# Patient Record
Sex: FEMALE | Race: OTHER | Employment: FULL TIME | URBAN - METROPOLITAN AREA
[De-identification: names, ages, dates, MRNs, and addresses within clinical notes are randomized per-mention and may not be internally consistent; named-entity substitution may affect disease eponyms.]

---

## 2017-01-07 ENCOUNTER — APPOINTMENT (OUTPATIENT)
Dept: ENDOCRINOLOGY | Facility: HOSPITAL | Age: 37
End: 2017-01-07
Attending: INTERNAL MEDICINE
Payer: COMMERCIAL

## 2017-02-04 ENCOUNTER — APPOINTMENT (OUTPATIENT)
Dept: ENDOCRINOLOGY | Facility: HOSPITAL | Age: 37
End: 2017-02-04
Attending: INTERNAL MEDICINE
Payer: COMMERCIAL

## 2017-02-11 ENCOUNTER — APPOINTMENT (OUTPATIENT)
Dept: ENDOCRINOLOGY | Facility: HOSPITAL | Age: 37
End: 2017-02-11
Attending: INTERNAL MEDICINE
Payer: COMMERCIAL

## 2017-02-18 ENCOUNTER — APPOINTMENT (OUTPATIENT)
Dept: ENDOCRINOLOGY | Facility: HOSPITAL | Age: 37
End: 2017-02-18
Attending: INTERNAL MEDICINE
Payer: COMMERCIAL

## 2017-02-25 ENCOUNTER — APPOINTMENT (OUTPATIENT)
Dept: ENDOCRINOLOGY | Facility: HOSPITAL | Age: 37
End: 2017-02-25
Attending: INTERNAL MEDICINE
Payer: COMMERCIAL

## 2017-04-11 ENCOUNTER — HOSPITAL ENCOUNTER (OUTPATIENT)
Age: 37
Discharge: HOME | End: 2017-04-11
Attending: EMERGENCY MEDICINE
Payer: COMMERCIAL

## 2017-04-11 VITALS
OXYGEN SATURATION: 100 % | HEIGHT: 63 IN | DIASTOLIC BLOOD PRESSURE: 100 MMHG | BODY MASS INDEX: 26.22 KG/M2 | RESPIRATION RATE: 16 BRPM | HEART RATE: 93 BPM | TEMPERATURE: 99 F | SYSTOLIC BLOOD PRESSURE: 170 MMHG | WEIGHT: 148 LBS

## 2017-04-11 DIAGNOSIS — E11.65 TYPE 2 DIABETES MELLITUS WITH HYPERGLYCEMIA, WITHOUT LONG-TERM CURRENT USE OF INSULIN (HCC): ICD-10-CM

## 2017-04-11 DIAGNOSIS — H00.012 HORDEOLUM EXTERNUM OF RIGHT LOWER EYELID: Primary | ICD-10-CM

## 2017-04-11 PROCEDURE — 82962 GLUCOSE BLOOD TEST: CPT

## 2017-04-11 PROCEDURE — 99214 OFFICE O/P EST MOD 30 MIN: CPT

## 2017-04-11 PROCEDURE — 99213 OFFICE O/P EST LOW 20 MIN: CPT

## 2017-04-12 NOTE — ED INITIAL ASSESSMENT (HPI)
Redness and small bump on right lower lid since Friday. Pt does wear contact lenses but haven't been wearing them since Saturday. Yesterday, pt developed some burning on right cheek. Some red bumps noted on it.

## 2017-04-12 NOTE — ED PROVIDER NOTES
[facility] [address]    History   Patient presents with:   Eye Visual Problem (opthalmic)    Stated Complaint: \"stye\"    HPI    The patient is a 49-year-old female with past history of hyperglycemia (currently on elements reviewed from today and agreed except as otherwise stated in HPI.     Physical Exam       ED Triage Vitals   BP 04/11/17 1947 188/97 mmHg   Pulse 04/11/17 1947 103   Resp 04/11/17 1947 16   Temp 04/11/17 1947 98.5 °F (36.9 °C)   Temp src 04/11/17 1 [facility])    Disposition:  Discharge    Follow-up:  Mojgan Newsome, [address] [id number]    In 3 days        Medications Prescribed:  Current Discharge Medication List    START taking these medications    Tobramycin Sulfate 0.3 % Oph

## 2017-04-12 NOTE — ED NOTES
Instructed pt to daily check BP around the same time for a few days, keep a log and discuss it with PMD

## 2017-06-14 ENCOUNTER — LAB ENCOUNTER (OUTPATIENT)
Dept: LAB | Age: 37
End: 2017-06-14
Attending: INTERNAL MEDICINE
Payer: COMMERCIAL

## 2017-06-14 ENCOUNTER — OFFICE VISIT (OUTPATIENT)
Dept: INTERNAL MEDICINE CLINIC | Facility: CLINIC | Age: 37
End: 2017-06-14

## 2017-06-14 VITALS
RESPIRATION RATE: 16 BRPM | SYSTOLIC BLOOD PRESSURE: 146 MMHG | DIASTOLIC BLOOD PRESSURE: 89 MMHG | HEART RATE: 99 BPM | WEIGHT: 138 LBS | TEMPERATURE: 98 F | BODY MASS INDEX: 24 KG/M2

## 2017-06-14 DIAGNOSIS — E11.29 TYPE 2 DIABETES MELLITUS WITH MICROALBUMINURIA, WITHOUT LONG-TERM CURRENT USE OF INSULIN (HCC): ICD-10-CM

## 2017-06-14 DIAGNOSIS — R80.9 TYPE 2 DIABETES MELLITUS WITH MICROALBUMINURIA, WITHOUT LONG-TERM CURRENT USE OF INSULIN (HCC): ICD-10-CM

## 2017-06-14 DIAGNOSIS — E11.43 GASTROPARESIS DUE TO DM (HCC): ICD-10-CM

## 2017-06-14 DIAGNOSIS — R11.14 BILIOUS VOMITING WITH NAUSEA: Primary | ICD-10-CM

## 2017-06-14 DIAGNOSIS — I10 ESSENTIAL HYPERTENSION: ICD-10-CM

## 2017-06-14 DIAGNOSIS — K31.84 GASTROPARESIS DUE TO DM (HCC): ICD-10-CM

## 2017-06-14 PROCEDURE — 83036 HEMOGLOBIN GLYCOSYLATED A1C: CPT

## 2017-06-14 PROCEDURE — 80061 LIPID PANEL: CPT

## 2017-06-14 PROCEDURE — 84443 ASSAY THYROID STIM HORMONE: CPT

## 2017-06-14 PROCEDURE — 36415 COLL VENOUS BLD VENIPUNCTURE: CPT

## 2017-06-14 PROCEDURE — 85025 COMPLETE CBC W/AUTO DIFF WBC: CPT

## 2017-06-14 PROCEDURE — 99212 OFFICE O/P EST SF 10 MIN: CPT | Performed by: INTERNAL MEDICINE

## 2017-06-14 PROCEDURE — 80053 COMPREHEN METABOLIC PANEL: CPT

## 2017-06-14 PROCEDURE — 99214 OFFICE O/P EST MOD 30 MIN: CPT | Performed by: INTERNAL MEDICINE

## 2017-06-14 RX ORDER — METOCLOPRAMIDE 5 MG/1
5 TABLET ORAL
Qty: 30 TABLET | Refills: 0 | Status: SHIPPED | OUTPATIENT
Start: 2017-06-14 | End: 2017-06-27

## 2017-06-14 NOTE — PROGRESS NOTES
Bonnie Borrego is a 43year old female.   Patient presents with:  Stomach Pain: presenting for vomiting, pressure,bloating x5 days       HPI:   Patient is a 42-year-old woman with past medical history of diabetes  C/c states that she has noticed some blo from throwing up   SKIN: denies any unusual skin lesions or rashes  RESPIRATORY: denies shortness of breath, cough, wheezing  CARDIOVASCULAR: denies chest pain on exertion, palpitations, swelling in feet  GI: denies abdominal pain and denies heartburn, ximena try reglan                                                                                                                                                              Essential hypertension   She states that she would like to see her primary care jaswinderia

## 2017-06-15 ENCOUNTER — APPOINTMENT (OUTPATIENT)
Dept: CT IMAGING | Facility: HOSPITAL | Age: 37
End: 2017-06-15
Attending: EMERGENCY MEDICINE
Payer: COMMERCIAL

## 2017-06-15 ENCOUNTER — TELEPHONE (OUTPATIENT)
Dept: GASTROENTEROLOGY | Facility: CLINIC | Age: 37
End: 2017-06-15

## 2017-06-15 ENCOUNTER — HOSPITAL ENCOUNTER (EMERGENCY)
Facility: HOSPITAL | Age: 37
Discharge: HOME | End: 2017-06-15
Attending: EMERGENCY MEDICINE
Payer: COMMERCIAL

## 2017-06-15 VITALS
TEMPERATURE: 98 F | SYSTOLIC BLOOD PRESSURE: 148 MMHG | BODY MASS INDEX: 24.45 KG/M2 | DIASTOLIC BLOOD PRESSURE: 89 MMHG | HEART RATE: 90 BPM | WEIGHT: 138 LBS | RESPIRATION RATE: 20 BRPM | HEIGHT: 63 IN | OXYGEN SATURATION: 100 %

## 2017-06-15 DIAGNOSIS — R11.2 NAUSEA AND VOMITING, INTRACTABILITY OF VOMITING NOT SPECIFIED, UNSPECIFIED VOMITING TYPE: ICD-10-CM

## 2017-06-15 DIAGNOSIS — K52.9 ENTERITIS: Primary | ICD-10-CM

## 2017-06-15 PROCEDURE — 74177 CT ABD & PELVIS W/CONTRAST: CPT | Performed by: EMERGENCY MEDICINE

## 2017-06-15 PROCEDURE — 81001 URINALYSIS AUTO W/SCOPE: CPT | Performed by: EMERGENCY MEDICINE

## 2017-06-15 PROCEDURE — 85025 COMPLETE CBC W/AUTO DIFF WBC: CPT | Performed by: EMERGENCY MEDICINE

## 2017-06-15 PROCEDURE — 96361 HYDRATE IV INFUSION ADD-ON: CPT

## 2017-06-15 PROCEDURE — 99284 EMERGENCY DEPT VISIT MOD MDM: CPT

## 2017-06-15 PROCEDURE — 96374 THER/PROPH/DIAG INJ IV PUSH: CPT

## 2017-06-15 PROCEDURE — 81025 URINE PREGNANCY TEST: CPT

## 2017-06-15 PROCEDURE — 80053 COMPREHEN METABOLIC PANEL: CPT | Performed by: EMERGENCY MEDICINE

## 2017-06-15 PROCEDURE — 87086 URINE CULTURE/COLONY COUNT: CPT | Performed by: EMERGENCY MEDICINE

## 2017-06-15 PROCEDURE — 83690 ASSAY OF LIPASE: CPT | Performed by: EMERGENCY MEDICINE

## 2017-06-15 PROCEDURE — 96375 TX/PRO/DX INJ NEW DRUG ADDON: CPT

## 2017-06-15 RX ORDER — ONDANSETRON 4 MG/1
4 TABLET, ORALLY DISINTEGRATING ORAL EVERY 4 HOURS PRN
Qty: 15 TABLET | Refills: 0 | Status: SHIPPED | OUTPATIENT
Start: 2017-06-15 | End: 2017-06-27

## 2017-06-15 RX ORDER — MORPHINE SULFATE 2 MG/ML
2 INJECTION, SOLUTION INTRAMUSCULAR; INTRAVENOUS ONCE
Status: COMPLETED | OUTPATIENT
Start: 2017-06-15 | End: 2017-06-15

## 2017-06-15 RX ORDER — ONDANSETRON 2 MG/ML
4 INJECTION INTRAMUSCULAR; INTRAVENOUS ONCE
Status: COMPLETED | OUTPATIENT
Start: 2017-06-15 | End: 2017-06-15

## 2017-06-15 RX ORDER — RANITIDINE 150 MG/1
150 TABLET ORAL EVERY 12 HOURS
Qty: 60 TABLET | Refills: 0 | Status: SHIPPED | OUTPATIENT
Start: 2017-06-15 | End: 2017-06-30

## 2017-06-15 NOTE — TELEPHONE ENCOUNTER
Pt was in the ER today - has swollen bowels , vomiting , gastro enteritis - they did CAT scan - told her to f/u with EMS

## 2017-06-15 NOTE — ED INITIAL ASSESSMENT (HPI)
Pt reports nausea and vomiting for the past month.  Pt seen pmd yest and dx with gastroenteritis and given metoclopramide and pt reports no relief of vomititng

## 2017-06-15 NOTE — ED PROVIDER NOTES
Patient Seen in: [facility] Emergency Department    History   Patient presents with:  Nausea/Vomiting/Diarrhea (gastrointestinal)    Stated Complaint: vomtiting for a month    HPI    Patient complains of mid upper abdominal pain that began few day as otherwise stated in HPI.     Physical Exam       ED Triage Vitals   BP 06/15/17 0918 154/92 mmHg   Pulse 06/15/17 0918 109   Resp 06/15/17 0918 20   Temp 06/15/17 0918 98.1 °F (36.7 °C)   Temp src 06/15/17 0918 Temporal   SpO2 06/15/17 1050 100 %   O2 De Narrative: The following orders were created for panel order CBC WITH DIFFERENTIAL WITH PLATELET.   Procedure                               Abnormality         Status                     ---------                               -----------         ------ 0

## 2017-06-16 NOTE — TELEPHONE ENCOUNTER
Dr. Sophy Burt:    No sooner appt available with you would this patient be OK to f/u in office with PB? Pt was in ED 6/15/17 for abdominal pain and intermittent N/V.      The CT a/p revealed:  CONCLUSION:   1. Nonspecific slight increase in small bowel fl

## 2017-06-17 ENCOUNTER — HOSPITAL ENCOUNTER (EMERGENCY)
Facility: HOSPITAL | Age: 37
Discharge: HOME | End: 2017-06-17
Attending: EMERGENCY MEDICINE
Payer: COMMERCIAL

## 2017-06-17 ENCOUNTER — APPOINTMENT (OUTPATIENT)
Dept: ULTRASOUND IMAGING | Facility: HOSPITAL | Age: 37
End: 2017-06-17
Attending: EMERGENCY MEDICINE
Payer: COMMERCIAL

## 2017-06-17 VITALS
WEIGHT: 138 LBS | HEIGHT: 63 IN | DIASTOLIC BLOOD PRESSURE: 90 MMHG | HEART RATE: 96 BPM | BODY MASS INDEX: 24.45 KG/M2 | OXYGEN SATURATION: 100 % | TEMPERATURE: 99 F | RESPIRATION RATE: 18 BRPM | SYSTOLIC BLOOD PRESSURE: 150 MMHG

## 2017-06-17 DIAGNOSIS — R11.15 NON-INTRACTABLE CYCLICAL VOMITING WITH NAUSEA: Primary | ICD-10-CM

## 2017-06-17 PROCEDURE — 83690 ASSAY OF LIPASE: CPT | Performed by: EMERGENCY MEDICINE

## 2017-06-17 PROCEDURE — 96375 TX/PRO/DX INJ NEW DRUG ADDON: CPT

## 2017-06-17 PROCEDURE — 96374 THER/PROPH/DIAG INJ IV PUSH: CPT

## 2017-06-17 PROCEDURE — 80076 HEPATIC FUNCTION PANEL: CPT | Performed by: EMERGENCY MEDICINE

## 2017-06-17 PROCEDURE — 85025 COMPLETE CBC W/AUTO DIFF WBC: CPT | Performed by: EMERGENCY MEDICINE

## 2017-06-17 PROCEDURE — 80048 BASIC METABOLIC PNL TOTAL CA: CPT | Performed by: EMERGENCY MEDICINE

## 2017-06-17 PROCEDURE — 99284 EMERGENCY DEPT VISIT MOD MDM: CPT

## 2017-06-17 PROCEDURE — 96361 HYDRATE IV INFUSION ADD-ON: CPT

## 2017-06-17 RX ORDER — HYDROCODONE BITARTRATE AND ACETAMINOPHEN 5; 325 MG/1; MG/1
1 TABLET ORAL EVERY 8 HOURS PRN
Qty: 10 TABLET | Refills: 0 | Status: SHIPPED | OUTPATIENT
Start: 2017-06-17 | End: 2017-06-24

## 2017-06-17 RX ORDER — HYDROMORPHONE HYDROCHLORIDE 1 MG/ML
0.5 INJECTION, SOLUTION INTRAMUSCULAR; INTRAVENOUS; SUBCUTANEOUS ONCE
Status: COMPLETED | OUTPATIENT
Start: 2017-06-17 | End: 2017-06-17

## 2017-06-17 RX ORDER — SODIUM CHLORIDE 9 MG/ML
INJECTION, SOLUTION INTRAVENOUS ONCE
Status: COMPLETED | OUTPATIENT
Start: 2017-06-17 | End: 2017-06-17

## 2017-06-17 RX ORDER — ONDANSETRON 4 MG/1
4 TABLET, ORALLY DISINTEGRATING ORAL EVERY 4 HOURS PRN
Qty: 20 TABLET | Refills: 0 | Status: SHIPPED | OUTPATIENT
Start: 2017-06-17 | End: 2017-06-24

## 2017-06-17 RX ORDER — ONDANSETRON 2 MG/ML
4 INJECTION INTRAMUSCULAR; INTRAVENOUS ONCE
Status: COMPLETED | OUTPATIENT
Start: 2017-06-17 | End: 2017-06-17

## 2017-06-17 RX ORDER — HYDROMORPHONE HYDROCHLORIDE 1 MG/ML
0.5 INJECTION, SOLUTION INTRAMUSCULAR; INTRAVENOUS; SUBCUTANEOUS ONCE
Status: DISCONTINUED | OUTPATIENT
Start: 2017-06-17 | End: 2017-06-17

## 2017-06-17 NOTE — ED INITIAL ASSESSMENT (HPI)
Patient c/o nausea, vomiting x 1 week. Seen here for the same and started on ranitidine and zofran without improvement. Also c/o general abdominal pain.

## 2017-06-17 NOTE — ED PROVIDER NOTES
Patient Seen in: [facility] AND [facility] Emergency Department    History   Patient presents with:  Nausea/Vomiting/Diarrhea (gastrointestinal)    Stated Complaint: Nausea    HPI    The patient is a 61-year-old female with past history of non-insulin-dependent MetFORMIN HCl  MG Oral Tablet 24 Hr,  Take 1 tablet (500 mg total) by mouth 2 (two) times daily with meals.        Family History   Problem Relation Age of Onset   • Hypertension Mother    • Diabetes Father    • Musculo-skelatal Disorder Father      A the touch      ED Course     Labs Reviewed   BASIC METABOLIC PANEL (8) - Abnormal; Notable for the following:     Glucose 195 (*)     Sodium 135 (*)     BUN 6 (*)     Creatinine 0.43 (*)     All other components within normal limits   HEPATIC FUNCTION PANE provider.

## 2017-06-18 NOTE — TELEPHONE ENCOUNTER
Chart reviewed. Patient has a history of diabetic gastroparesis. It is okay to see  Migel Srivastava PA-C in the office. She may need documentation of gastroparesis with gastric emptying scan if not already done.

## 2017-06-19 NOTE — TELEPHONE ENCOUNTER
Will be happy to evaluate the patient. If previous GES not performed, will start there. Yaneth Thomas, please forward when the patient has been scheduled. Hope she is feeling better!

## 2017-06-19 NOTE — TELEPHONE ENCOUNTER
Pt contacted and she accepted a f/u appt with PB on 6/27/17 @ 9:30am, [facility]

## 2017-06-26 NOTE — H&P
[address] [facility] [address] Gastroenterology                                                                                                  Clinic History and Physical     Pa note, the patient states \"I am in denial about my diabetes\". She reports being diagnosed with diabetes approximately 12 years ago. She is not currently taking any medications for this. She denies chest pain, shortness of breath or syncopal episodes. by mouth daily. Disp:  Rfl:    FREESTYLE SYSTEM Does not apply Kit 1 each by Does not apply route as needed for Other.  Disp: 1 kit Rfl: 0   Glucose Blood (FREESTYLE TEST) In Vitro Strip Diabetes Disp: 100 strip Rfl: 0   FREESTYLE LANCETS Does not apply Mis epigastric pain, nausea and vomiting x 2 weeks.  Patient was seen in the ED 6/15 revealing total bili at 1.6 with direct 0.3, lipase, LFTs and alk phos WNL, negative pregnancy tests and abnormal urinalysis c/w uncontrolled DMII (Glucose >500, +ketones +prot OV    Orders This Visit:    Orders Placed This Encounter      Hepatic Function Panel (7)    Meds This Visit:    No prescriptions requested or ordered in this encounter    Imaging & Referrals:  None       Carie Muñoz PA-C  6/27/2017

## 2017-06-27 ENCOUNTER — OFFICE VISIT (OUTPATIENT)
Dept: GASTROENTEROLOGY | Facility: CLINIC | Age: 37
End: 2017-06-27

## 2017-06-27 VITALS
SYSTOLIC BLOOD PRESSURE: 130 MMHG | WEIGHT: 137.38 LBS | HEIGHT: 62.5 IN | BODY MASS INDEX: 24.65 KG/M2 | DIASTOLIC BLOOD PRESSURE: 85 MMHG | HEART RATE: 109 BPM

## 2017-06-27 DIAGNOSIS — R11.2 NAUSEA AND VOMITING, INTRACTABILITY OF VOMITING NOT SPECIFIED, UNSPECIFIED VOMITING TYPE: Primary | ICD-10-CM

## 2017-06-27 DIAGNOSIS — R10.13 EPIGASTRIC PAIN: ICD-10-CM

## 2017-06-27 DIAGNOSIS — R17 ELEVATED BILIRUBIN: ICD-10-CM

## 2017-06-27 PROCEDURE — 99213 OFFICE O/P EST LOW 20 MIN: CPT | Performed by: PHYSICIAN ASSISTANT

## 2017-06-27 PROCEDURE — 99244 OFF/OP CNSLTJ NEW/EST MOD 40: CPT | Performed by: PHYSICIAN ASSISTANT

## 2017-06-27 NOTE — PATIENT INSTRUCTIONS
1. Please follow up with Dr. Atiya Kenny re: your diabetes. This is very important in your overall health and management of your symptoms. 2. Please also bring up (non-urgent) the gynecology concerns you discussed with me in office.     3. Avoid heartburn t

## 2017-07-17 ENCOUNTER — TELEPHONE (OUTPATIENT)
Dept: INTERNAL MEDICINE CLINIC | Facility: CLINIC | Age: 37
End: 2017-07-17

## 2017-07-19 ENCOUNTER — OFFICE VISIT (OUTPATIENT)
Dept: INTERNAL MEDICINE CLINIC | Facility: CLINIC | Age: 37
End: 2017-07-19

## 2017-07-19 VITALS
BODY MASS INDEX: 24.76 KG/M2 | HEART RATE: 57 BPM | SYSTOLIC BLOOD PRESSURE: 128 MMHG | HEIGHT: 62.5 IN | WEIGHT: 138 LBS | TEMPERATURE: 99 F | OXYGEN SATURATION: 99 % | DIASTOLIC BLOOD PRESSURE: 78 MMHG

## 2017-07-19 DIAGNOSIS — R10.13 EPIGASTRIC PAIN: ICD-10-CM

## 2017-07-19 DIAGNOSIS — R11.2 NAUSEA AND VOMITING, INTRACTABILITY OF VOMITING NOT SPECIFIED, UNSPECIFIED VOMITING TYPE: Primary | ICD-10-CM

## 2017-07-19 DIAGNOSIS — E11.8 UNCONTROLLED TYPE 2 DIABETES MELLITUS WITH COMPLICATION, WITHOUT LONG-TERM CURRENT USE OF INSULIN (HCC): ICD-10-CM

## 2017-07-19 DIAGNOSIS — E78.5 HYPERLIPIDEMIA, UNSPECIFIED HYPERLIPIDEMIA TYPE: ICD-10-CM

## 2017-07-19 DIAGNOSIS — E11.65 UNCONTROLLED TYPE 2 DIABETES MELLITUS WITH COMPLICATION, WITHOUT LONG-TERM CURRENT USE OF INSULIN (HCC): ICD-10-CM

## 2017-07-19 LAB
ALBUMIN SERPL BCP-MCNC: 3.9 G/DL (ref 3.5–4.8)
ALBUMIN/GLOB SERPL: 1.3 {RATIO} (ref 1–2)
ALP SERPL-CCNC: 43 U/L (ref 32–100)
ALT SERPL-CCNC: 17 U/L (ref 14–54)
ANION GAP SERPL CALC-SCNC: 11 MMOL/L (ref 0–18)
AST SERPL-CCNC: 18 U/L (ref 15–41)
BASOPHILS # BLD: 0 K/UL (ref 0–0.2)
BASOPHILS NFR BLD: 0 %
BILIRUB SERPL-MCNC: 1 MG/DL (ref 0.3–1.2)
BUN SERPL-MCNC: 5 MG/DL (ref 8–20)
BUN/CREAT SERPL: 11.6 (ref 10–20)
CALCIUM SERPL-MCNC: 9 MG/DL (ref 8.5–10.5)
CHLORIDE SERPL-SCNC: 102 MMOL/L (ref 95–110)
CO2 SERPL-SCNC: 26 MMOL/L (ref 22–32)
CREAT SERPL-MCNC: 0.43 MG/DL (ref 0.5–1.5)
EOSINOPHIL # BLD: 0 K/UL (ref 0–0.7)
EOSINOPHIL NFR BLD: 1 %
ERYTHROCYTE [DISTWIDTH] IN BLOOD BY AUTOMATED COUNT: 12.7 % (ref 11–15)
GLOBULIN PLAS-MCNC: 3.1 G/DL (ref 2.5–3.7)
GLUCOSE SERPL-MCNC: 151 MG/DL (ref 70–99)
HCT VFR BLD AUTO: 39.3 % (ref 35–48)
HGB BLD-MCNC: 13.6 G/DL (ref 12–16)
LIPASE SERPL-CCNC: 25 U/L (ref 22–51)
LYMPHOCYTES # BLD: 1.8 K/UL (ref 1–4)
LYMPHOCYTES NFR BLD: 26 %
MCH RBC QN AUTO: 32.6 PG (ref 27–32)
MCHC RBC AUTO-ENTMCNC: 34.7 G/DL (ref 32–37)
MCV RBC AUTO: 94 FL (ref 80–100)
MONOCYTES # BLD: 0.4 K/UL (ref 0–1)
MONOCYTES NFR BLD: 6 %
NEUTROPHILS # BLD AUTO: 4.6 K/UL (ref 1.8–7.7)
NEUTROPHILS NFR BLD: 68 %
OSMOLALITY UR CALC.SUM OF ELEC: 288 MOSM/KG (ref 275–295)
PLATELET # BLD AUTO: 265 K/UL (ref 140–400)
PMV BLD AUTO: 8.9 FL (ref 7.4–10.3)
POTASSIUM SERPL-SCNC: 3.8 MMOL/L (ref 3.3–5.1)
PROT SERPL-MCNC: 7 G/DL (ref 5.9–8.4)
RBC # BLD AUTO: 4.18 M/UL (ref 3.7–5.4)
SODIUM SERPL-SCNC: 139 MMOL/L (ref 136–144)
WBC # BLD AUTO: 6.8 K/UL (ref 4–11)

## 2017-07-19 PROCEDURE — 80053 COMPREHEN METABOLIC PANEL: CPT | Performed by: INTERNAL MEDICINE

## 2017-07-19 PROCEDURE — 99214 OFFICE O/P EST MOD 30 MIN: CPT | Performed by: INTERNAL MEDICINE

## 2017-07-19 PROCEDURE — 83690 ASSAY OF LIPASE: CPT | Performed by: INTERNAL MEDICINE

## 2017-07-19 PROCEDURE — 36415 COLL VENOUS BLD VENIPUNCTURE: CPT | Performed by: INTERNAL MEDICINE

## 2017-07-19 PROCEDURE — 85025 COMPLETE CBC W/AUTO DIFF WBC: CPT | Performed by: INTERNAL MEDICINE

## 2017-07-19 RX ORDER — RANITIDINE 150 MG/1
150 TABLET ORAL 2 TIMES DAILY
COMMUNITY
End: 2017-10-13

## 2017-07-19 RX ORDER — OMEPRAZOLE 40 MG/1
40 CAPSULE, DELAYED RELEASE ORAL DAILY
Qty: 30 CAPSULE | Refills: 1 | Status: SHIPPED | OUTPATIENT
Start: 2017-07-19 | End: 2017-08-18

## 2017-07-19 RX ORDER — METOCLOPRAMIDE 10 MG/1
10 TABLET ORAL 3 TIMES DAILY PRN
Qty: 30 TABLET | Refills: 1 | Status: SHIPPED | OUTPATIENT
Start: 2017-07-19 | End: 2017-10-20 | Stop reason: ALTCHOICE

## 2017-07-19 NOTE — PROGRESS NOTES
Carlton Dunn is a 43year old female.     Chief complaint: ER follow up, epigastric pain, nausea and vomiting     HPI:     43 ear old female non compliant   Here for ER follow up   Reports having nausea dn vomiting   2-3 months ago   Upset stomach on a ALS     Patient Active Problem List:     Cough     Contraception management     Leg pain     Breast pain     Vitamin D deficiency     Type 2 diabetes mellitus with microalbuminuria, without long-term current use of insulin ([facility] [address]      REVIEW OF SYSTEMS:   A to follow up with pcp   Will check stool H pylori after getting the sample to start PPI  Ddx? Gastritis/ ulcer gastroparesis   Advised to control her dm   Start reglan ?  Gastroparesis ?      (R10.13) Epigastric pain  Plan: will check cbc, cmp and lipase

## 2017-07-20 ENCOUNTER — NURSE ONLY (OUTPATIENT)
Dept: INTERNAL MEDICINE CLINIC | Facility: CLINIC | Age: 37
End: 2017-07-20

## 2017-07-20 DIAGNOSIS — R10.13 EPIGASTRIC PAIN: ICD-10-CM

## 2017-07-20 PROCEDURE — 87338 HPYLORI STOOL AG IA: CPT | Performed by: INTERNAL MEDICINE

## 2017-07-22 LAB — HELICOBACTER PYLORI AG, FECAL: NEGATIVE

## 2017-08-01 ENCOUNTER — TELEPHONE (OUTPATIENT)
Dept: INTERNAL MEDICINE CLINIC | Facility: CLINIC | Age: 37
End: 2017-08-01

## 2017-08-01 DIAGNOSIS — R11.2 NAUSEA AND VOMITING, INTRACTABILITY OF VOMITING NOT SPECIFIED, UNSPECIFIED VOMITING TYPE: Primary | ICD-10-CM

## 2017-08-02 ENCOUNTER — TELEPHONE (OUTPATIENT)
Dept: GASTROENTEROLOGY | Facility: CLINIC | Age: 37
End: 2017-08-02

## 2017-08-02 NOTE — TELEPHONE ENCOUNTER
Pt states that Dr. Alton Murcia gave her permission to go ahead with EGD. Pt would like to schedule as soon as possible. Please call.

## 2017-08-02 NOTE — TELEPHONE ENCOUNTER
Spoke to pt    She states she is experiencing abdominal pain despite the Omeprazole. She has discontinued the Ranitidine because it wasn't working at all    H pylori is negative.     She has a f/u appt with you on 08/17/17  She states she spoke with her PCP

## 2017-08-03 NOTE — TELEPHONE ENCOUNTER
Carie,     The pt has next OV with you on 8/17/17 @ 9am. Per last OV note on 6/27/17 she was to f/u and discuss possible EGD at next OV. She is calling wanted to schedule the procedure.      Please advise if OK to proceed with procedure or if you would like

## 2017-08-04 NOTE — TELEPHONE ENCOUNTER
Scheduled for:  EGD [id number]  Provider Name: Dr. Victor Hugo Lindsay  Date:  Tuesday, 8/8/17  Location:  [facility]  Sedation:  IV  Time: 7:30am (6:30am arrival time)   Prep: NPO at midnight  Meds/Allergies Reconciled?:  Yes, no blood thinners, pt is on janumet  Diagnosis with c

## 2017-08-04 NOTE — TELEPHONE ENCOUNTER
Dr. Carlton Goodman placed her on DMII medications - f/u in September. Had a f/u planned for this coming Monday but was told to cx by provider. Still throwing up - not triggered by food. Eating same things every day. Intermittent good days.  Feels as if the med

## 2017-08-08 ENCOUNTER — HOSPITAL ENCOUNTER (OUTPATIENT)
Facility: HOSPITAL | Age: 37
Setting detail: HOSPITAL OUTPATIENT SURGERY
Discharge: HOME | End: 2017-08-08
Attending: INTERNAL MEDICINE | Admitting: INTERNAL MEDICINE
Payer: COMMERCIAL

## 2017-08-08 ENCOUNTER — SURGERY (OUTPATIENT)
Age: 37
End: 2017-08-08

## 2017-08-08 ENCOUNTER — TELEPHONE (OUTPATIENT)
Dept: GASTROENTEROLOGY | Facility: CLINIC | Age: 37
End: 2017-08-08

## 2017-08-08 DIAGNOSIS — R10.13 EPIGASTRIC PAIN: ICD-10-CM

## 2017-08-08 DIAGNOSIS — R11.2 NAUSEA AND VOMITING: ICD-10-CM

## 2017-08-08 LAB — B-HCG UR QL: NEGATIVE

## 2017-08-08 PROCEDURE — 0DB68ZX EXCISION OF STOMACH, VIA NATURAL OR ARTIFICIAL OPENING ENDOSCOPIC, DIAGNOSTIC: ICD-10-PCS | Performed by: INTERNAL MEDICINE

## 2017-08-08 PROCEDURE — 43239 EGD BIOPSY SINGLE/MULTIPLE: CPT | Performed by: INTERNAL MEDICINE

## 2017-08-08 PROCEDURE — 99152 MOD SED SAME PHYS/QHP 5/>YRS: CPT | Performed by: INTERNAL MEDICINE

## 2017-08-08 RX ORDER — MIDAZOLAM HYDROCHLORIDE 1 MG/ML
INJECTION INTRAMUSCULAR; INTRAVENOUS
Status: DISCONTINUED | OUTPATIENT
Start: 2017-08-08 | End: 2017-08-08

## 2017-08-08 RX ORDER — SODIUM CHLORIDE, SODIUM LACTATE, POTASSIUM CHLORIDE, CALCIUM CHLORIDE 600; 310; 30; 20 MG/100ML; MG/100ML; MG/100ML; MG/100ML
INJECTION, SOLUTION INTRAVENOUS CONTINUOUS
Status: DISCONTINUED | OUTPATIENT
Start: 2017-08-08 | End: 2017-08-08

## 2017-08-08 RX ORDER — MIDAZOLAM HYDROCHLORIDE 1 MG/ML
1 INJECTION INTRAMUSCULAR; INTRAVENOUS EVERY 5 MIN PRN
Status: DISCONTINUED | OUTPATIENT
Start: 2017-08-08 | End: 2017-08-08

## 2017-08-08 RX ORDER — SODIUM CHLORIDE 0.9 % (FLUSH) 0.9 %
10 SYRINGE (ML) INJECTION AS NEEDED
Status: DISCONTINUED | OUTPATIENT
Start: 2017-08-08 | End: 2017-08-08

## 2017-08-08 NOTE — H&P
History & Physical Examination    Patient Name: Regaan Cornejo  MRN: [id number]  [facility]: [id number]  YOB: 1980    Diagnosis: Recurring vomiting, belching symptom    Present Illness:     Reagan Cornejo is a 43year old female patient of  taking any medications for this.     She denies chest pain, shortness of breath or syncopal episodes.   She does report feeling weak when she presented to the emergency department.     Personal Hx Cancer(s):  - None     Surgical Hx:  - Cholecystectomy 2005/ Saline Flush 0.9 % injection 10 mL 10 mL Intravenous PRN   lactated ringers infusion  Intravenous Continuous   Midazolam HCl (VERSED) 2 MG/2ML injection 1 mg 1 mg Intravenous Q5 Min PRN   fentaNYL citrate (SUBLIMAZE) 0.05 MG/ML injection 25 mcg 25 mcg Intr

## 2017-08-08 NOTE — TELEPHONE ENCOUNTER
Mailed letter to patient notifying him of overdue labs (Hepatic Function Panel) Ordered 6-27-17 and due mid-July 2017. Overdue letter mailed to patient.

## 2017-08-08 NOTE — PRE-SEDATION ASSESSMENT
Physician Pre-Sedation Assessment    Pre-Sedation Assessment:    Sedation History: Airway Assessed    Cardiac: normal S1, S2  Respiratory: breath sounds clear bilaterally   Abdomen: soft, BS (+), non-tender    ASA Classification: 2.  Patient with mild syste

## 2017-08-08 NOTE — OPERATIVE REPORT
EGD PROCEDURE REPORT    DATE OF PROCEDURE:  8/8/2017    PCP: Hai Hall MD     PREOPERATIVE DIAGNOSIS: Recurring vomiting, belching symptom, recent uncontrolled diabetes     POSTOPERATIVE DIAGNOSIS:  See impression. SURGEON:  Juan Espinoza,

## 2017-08-09 VITALS
HEIGHT: 63 IN | HEART RATE: 92 BPM | BODY MASS INDEX: 23.92 KG/M2 | RESPIRATION RATE: 13 BRPM | WEIGHT: 135 LBS | SYSTOLIC BLOOD PRESSURE: 113 MMHG | OXYGEN SATURATION: 99 % | DIASTOLIC BLOOD PRESSURE: 82 MMHG

## 2017-08-09 LAB — CLO TEST: NEGATIVE

## 2017-08-15 RX ORDER — BLOOD-GLUCOSE METER
KIT MISCELLANEOUS
Qty: 100 STRIP | Refills: 0 | Status: SHIPPED | OUTPATIENT
Start: 2017-08-15 | End: 2021-05-10 | Stop reason: WASHOUT

## 2017-08-16 ENCOUNTER — TELEPHONE (OUTPATIENT)
Dept: GASTROENTEROLOGY | Facility: CLINIC | Age: 37
End: 2017-08-16

## 2017-08-17 NOTE — TELEPHONE ENCOUNTER
Pt contacted and reviewed Dr. Samantha Puckett message below in detail. She states that she has been feeling much better since d/c the omeprazole and feels that the medication \"wasn't do anything\" for her.  She states it has been over 1 week that she has not vomi

## 2017-08-17 NOTE — TELEPHONE ENCOUNTER
GI RNs–  Please call Ms. Claire Timmons and family to advise that gastric biopsy showed mild gastritis but no infection or serious problem to explain her symptoms. This probably is diabetic gastroparesis, the effects of diabetes on the stomach.     If nausea a

## 2017-10-13 ENCOUNTER — OFFICE VISIT (OUTPATIENT)
Dept: INTERNAL MEDICINE CLINIC | Facility: CLINIC | Age: 37
End: 2017-10-13

## 2017-10-13 VITALS
HEIGHT: 62.5 IN | TEMPERATURE: 99 F | SYSTOLIC BLOOD PRESSURE: 122 MMHG | HEART RATE: 97 BPM | WEIGHT: 139.38 LBS | BODY MASS INDEX: 25.01 KG/M2 | DIASTOLIC BLOOD PRESSURE: 80 MMHG | OXYGEN SATURATION: 99 %

## 2017-10-13 DIAGNOSIS — N89.8 VAGINAL DISCHARGE: ICD-10-CM

## 2017-10-13 DIAGNOSIS — E11.29 TYPE 2 DIABETES MELLITUS WITH MICROALBUMINURIA, WITHOUT LONG-TERM CURRENT USE OF INSULIN (HCC): Primary | ICD-10-CM

## 2017-10-13 DIAGNOSIS — E78.5 HYPERLIPIDEMIA, UNSPECIFIED HYPERLIPIDEMIA TYPE: ICD-10-CM

## 2017-10-13 DIAGNOSIS — R80.9 TYPE 2 DIABETES MELLITUS WITH MICROALBUMINURIA, WITHOUT LONG-TERM CURRENT USE OF INSULIN (HCC): Primary | ICD-10-CM

## 2017-10-13 DIAGNOSIS — E55.9 VITAMIN D DEFICIENCY: ICD-10-CM

## 2017-10-13 DIAGNOSIS — Z71.6 ENCOUNTER FOR SMOKING CESSATION COUNSELING: ICD-10-CM

## 2017-10-13 DIAGNOSIS — R11.2 NAUSEA AND VOMITING, INTRACTABILITY OF VOMITING NOT SPECIFIED, UNSPECIFIED VOMITING TYPE: ICD-10-CM

## 2017-10-13 DIAGNOSIS — Z23 NEED FOR INFLUENZA VACCINATION: ICD-10-CM

## 2017-10-13 PROCEDURE — 87808 TRICHOMONAS ASSAY W/OPTIC: CPT | Performed by: INTERNAL MEDICINE

## 2017-10-13 PROCEDURE — 80053 COMPREHEN METABOLIC PANEL: CPT | Performed by: INTERNAL MEDICINE

## 2017-10-13 PROCEDURE — 99214 OFFICE O/P EST MOD 30 MIN: CPT | Performed by: INTERNAL MEDICINE

## 2017-10-13 PROCEDURE — 80061 LIPID PANEL: CPT | Performed by: INTERNAL MEDICINE

## 2017-10-13 PROCEDURE — 87205 SMEAR GRAM STAIN: CPT | Performed by: INTERNAL MEDICINE

## 2017-10-13 PROCEDURE — 82043 UR ALBUMIN QUANTITATIVE: CPT | Performed by: INTERNAL MEDICINE

## 2017-10-13 PROCEDURE — 87106 FUNGI IDENTIFICATION YEAST: CPT | Performed by: INTERNAL MEDICINE

## 2017-10-13 PROCEDURE — 82570 ASSAY OF URINE CREATININE: CPT | Performed by: INTERNAL MEDICINE

## 2017-10-13 PROCEDURE — 82306 VITAMIN D 25 HYDROXY: CPT | Performed by: INTERNAL MEDICINE

## 2017-10-13 PROCEDURE — 83036 HEMOGLOBIN GLYCOSYLATED A1C: CPT | Performed by: INTERNAL MEDICINE

## 2017-10-13 PROCEDURE — 90471 IMMUNIZATION ADMIN: CPT | Performed by: INTERNAL MEDICINE

## 2017-10-13 PROCEDURE — 90686 IIV4 VACC NO PRSV 0.5 ML IM: CPT | Performed by: INTERNAL MEDICINE

## 2017-10-13 NOTE — PROGRESS NOTES
Radha Alexis is a 42year old female.     Chief complaint: follow up on DM , vagina discharge, vomiting     HPI:     42year old female with PMH as listed below here for follow up on dm   Has been taking her medications regularly   No sweating no tremo ALS     Patient Active Problem List:     Cough     Contraception management     Leg pain     Breast pain     Vitamin D deficiency     Type 2 diabetes mellitus with microalbuminuria, without long-term current use of insulin ([facility] [address]      REVIEW OF SYSTEMS:   A 5. Nausea and vomiting, intractability of vomiting not specified, unspecified vomiting type  Follow up with gi   Check Hba1c     6.  Hyperlipidemia, unspecified hyperlipidemia type  Lipid panel     7-encounter  for  smoking cessation :  Advised risks

## 2017-10-18 ENCOUNTER — TELEPHONE (OUTPATIENT)
Dept: INTERNAL MEDICINE CLINIC | Facility: CLINIC | Age: 37
End: 2017-10-18

## 2017-10-18 DIAGNOSIS — Z11.3 SCREENING EXAMINATION FOR STD (SEXUALLY TRANSMITTED DISEASE): Primary | ICD-10-CM

## 2017-10-18 RX ORDER — METRONIDAZOLE 500 MG/1
500 TABLET ORAL 2 TIMES DAILY
Qty: 14 TABLET | Refills: 0 | Status: SHIPPED | OUTPATIENT
Start: 2017-10-18 | End: 2017-10-25

## 2017-10-18 RX ORDER — LISINOPRIL 2.5 MG/1
2.5 TABLET ORAL DAILY
Qty: 90 TABLET | Refills: 1 | Status: ON HOLD | OUTPATIENT
Start: 2017-10-18 | End: 2017-11-21

## 2017-10-18 RX ORDER — ERGOCALCIFEROL 1.25 MG/1
50000 CAPSULE ORAL WEEKLY
Qty: 12 CAPSULE | Refills: 1 | Status: SHIPPED | OUTPATIENT
Start: 2017-10-18 | End: 2018-01-04

## 2017-10-20 ENCOUNTER — OFFICE VISIT (OUTPATIENT)
Dept: GASTROENTEROLOGY | Facility: CLINIC | Age: 37
End: 2017-10-20

## 2017-10-20 VITALS
BODY MASS INDEX: 24.76 KG/M2 | HEART RATE: 98 BPM | DIASTOLIC BLOOD PRESSURE: 80 MMHG | WEIGHT: 138 LBS | SYSTOLIC BLOOD PRESSURE: 123 MMHG | HEIGHT: 62.5 IN

## 2017-10-20 DIAGNOSIS — R11.2 NAUSEA AND VOMITING, INTRACTABILITY OF VOMITING NOT SPECIFIED, UNSPECIFIED VOMITING TYPE: Primary | ICD-10-CM

## 2017-10-20 DIAGNOSIS — K59.00 CONSTIPATION, UNSPECIFIED CONSTIPATION TYPE: ICD-10-CM

## 2017-10-20 PROCEDURE — 99214 OFFICE O/P EST MOD 30 MIN: CPT | Performed by: PHYSICIAN ASSISTANT

## 2017-10-20 PROCEDURE — 99212 OFFICE O/P EST SF 10 MIN: CPT | Performed by: PHYSICIAN ASSISTANT

## 2017-10-20 RX ORDER — ONDANSETRON 4 MG/1
4 TABLET, FILM COATED ORAL EVERY 8 HOURS PRN
Qty: 30 TABLET | Refills: 1 | Status: SHIPPED | OUTPATIENT
Start: 2017-10-20 | End: 2021-05-10 | Stop reason: WASHOUT

## 2017-10-20 NOTE — PROGRESS NOTES
[facility] Follow-up Visit    Sindy controlling her DM. She has not tried anything for her BMs. Occasionally feels as if straining, but generally not. Daily BMs without blood or melena. No rectal pain, fevers, chills, diarrhea or night sweats. No CP/SOB. Denies unintentional weight loss. Patient believes she may have undergone EGD at the time of her cholecystectomy      Social Hx:  - 0.5 ppd/20 years/Social ETOH  - Denies illicit drug use   - Occupation: [address] [place] with  and 25year old daughter  - Was t LANCETS Does not apply Misc dm Disp: 100 each Rfl: 0       Allergies:  No Known Allergies    ROS:   CONSTITUTIONAL:  negative for fevers, chills  EYES:  negative for change in vision  RESPIRATORY:  negative for  shortness of breath  CARDIOVASCULAR:  negati mass identified. Hepatic labs are overdue. Patient underwent upper endoscopy with Dr. Julien Ramirez August 2017 which was unremarkable. Started taking Reglan before meals 1-2 times a day and does not feel as if this has given her much change in her symptoms.  Pres

## 2017-10-20 NOTE — PATIENT INSTRUCTIONS
1. Gastric emptying scan - please call to schedule this. 2. Discontinue Reglan (Metoclopramide) and start taking Zofran. 3. Start fiber supplement daily - Benefiber + plenty of water. 4. Keep track of if you become dizzy and then nauseous.  Please

## 2017-10-21 ENCOUNTER — APPOINTMENT (OUTPATIENT)
Dept: LAB | Age: 37
End: 2017-10-21
Attending: INTERNAL MEDICINE
Payer: COMMERCIAL

## 2017-10-21 DIAGNOSIS — Z11.3 SCREENING EXAMINATION FOR STD (SEXUALLY TRANSMITTED DISEASE): ICD-10-CM

## 2017-10-21 DIAGNOSIS — R17 ELEVATED BILIRUBIN: ICD-10-CM

## 2017-10-21 PROCEDURE — 80076 HEPATIC FUNCTION PANEL: CPT

## 2017-10-21 PROCEDURE — 87389 HIV-1 AG W/HIV-1&-2 AB AG IA: CPT

## 2017-10-21 PROCEDURE — 86803 HEPATITIS C AB TEST: CPT

## 2017-10-21 PROCEDURE — 86780 TREPONEMA PALLIDUM: CPT

## 2017-10-21 PROCEDURE — 86704 HEP B CORE ANTIBODY TOTAL: CPT

## 2017-10-21 PROCEDURE — 87340 HEPATITIS B SURFACE AG IA: CPT

## 2017-10-21 PROCEDURE — 80500 HEPATITIS B PROFILE: CPT

## 2017-10-21 PROCEDURE — 86706 HEP B SURFACE ANTIBODY: CPT

## 2017-10-21 PROCEDURE — 36415 COLL VENOUS BLD VENIPUNCTURE: CPT

## 2017-10-31 ENCOUNTER — HOSPITAL ENCOUNTER (OUTPATIENT)
Dept: NUCLEAR MEDICINE | Facility: HOSPITAL | Age: 37
Discharge: HOME | End: 2017-10-31
Attending: PHYSICIAN ASSISTANT
Payer: COMMERCIAL

## 2017-10-31 DIAGNOSIS — R11.2 NAUSEA AND VOMITING, INTRACTABILITY OF VOMITING NOT SPECIFIED, UNSPECIFIED VOMITING TYPE: ICD-10-CM

## 2017-10-31 PROCEDURE — 78264 GASTRIC EMPTYING IMG STUDY: CPT | Performed by: PHYSICIAN ASSISTANT

## 2017-11-01 ENCOUNTER — PATIENT MESSAGE (OUTPATIENT)
Dept: GASTROENTEROLOGY | Facility: CLINIC | Age: 37
End: 2017-11-01

## 2017-11-02 NOTE — TELEPHONE ENCOUNTER
From: Radha Alexis  To:  [facility]te [facility] [place]  Sent: 11/1/2017 12:31 PM CDT  Subject: Test Results Question    Good Afternoon,   Thank you for keeping me up to date on my results, it's good news the test was normal. Last week I threw up 4 days out of 7 bu

## 2017-11-03 NOTE — TELEPHONE ENCOUNTER
Mary Duckworth,     At this point, I would monitor what you were doing for the last 1 week wherein you have not vomited since Thursday.  Fortunately, we have

## 2017-11-13 ENCOUNTER — OFFICE VISIT (OUTPATIENT)
Dept: FAMILY MEDICINE CLINIC | Facility: CLINIC | Age: 37
End: 2017-11-13

## 2017-11-13 VITALS
OXYGEN SATURATION: 99 % | SYSTOLIC BLOOD PRESSURE: 124 MMHG | DIASTOLIC BLOOD PRESSURE: 82 MMHG | BODY MASS INDEX: 26 KG/M2 | TEMPERATURE: 99 F | WEIGHT: 144 LBS | HEART RATE: 98 BPM

## 2017-11-13 DIAGNOSIS — N30.90 CYSTITIS: ICD-10-CM

## 2017-11-13 DIAGNOSIS — R30.0 DYSURIA: Primary | ICD-10-CM

## 2017-11-13 PROCEDURE — 87086 URINE CULTURE/COLONY COUNT: CPT | Performed by: PHYSICIAN ASSISTANT

## 2017-11-13 PROCEDURE — 81003 URINALYSIS AUTO W/O SCOPE: CPT | Performed by: PHYSICIAN ASSISTANT

## 2017-11-13 PROCEDURE — 99213 OFFICE O/P EST LOW 20 MIN: CPT | Performed by: PHYSICIAN ASSISTANT

## 2017-11-13 RX ORDER — NITROFURANTOIN 25; 75 MG/1; MG/1
100 CAPSULE ORAL 2 TIMES DAILY
Qty: 14 CAPSULE | Refills: 0 | Status: SHIPPED | OUTPATIENT
Start: 2017-11-13 | End: 2017-11-20

## 2017-11-13 RX ORDER — SULFAMETHOXAZOLE AND TRIMETHOPRIM 800; 160 MG/1; MG/1
1 TABLET ORAL 2 TIMES DAILY
Qty: 14 TABLET | Refills: 0 | Status: SHIPPED | OUTPATIENT
Start: 2017-11-13 | End: 2017-11-20

## 2017-11-14 NOTE — PATIENT INSTRUCTIONS
You likely have a urinary tract infection. Your urine was sent to the lab for a culture. We will get the results of the culture back in about 72 hours.   This information will tell us what type of bacteria is in your urine and whether that bacteria is res · Drink lots of fluids (at least 6-8 glasses a day, unless you must restrict fluids for other medical reasons). This will force the medicine into your urinary system and flush the bacteria out of your body.   · Avoid sexual intercourse until your symptoms a

## 2017-11-14 NOTE — PROGRESS NOTES
CHIEF COMPLAINT:   Patient presents with:  Urinary Frequency      HPI:   Roberto Coulter is a 42year old female who presents with symptoms of UTI. Complaining of urinary frequency, urgency, dysuria for 4 days.    Denies flank pain, fever, abdominal nikita HEENT: no congestion, rhinorrhea, sore throat or ear pain  CARDIOVASCULAR: denies chest pain or palpitations  LUNGS: denies shortness of breath, cough, or wheezing  GI: See HPI. No N/V/C/D. : See HPI.       EXAM:   /82   Pulse 98   Temp 98.5 °F (3 Keep the area around your vagina clean. Wipe from front to back after you go to the bathroom. Gently wash the area around your vagina when you bathe or shower. Wear cotton underwear. Use pantyhose with cotton crotches. Avoid tight clothing.  Wear A bladder infection (\"cystitis\" or \"UTI\") usually causes a constant urge to urinate and a burning when passing urine. Urine may be cloudy, smelly or dark. There may be pain in the lower abdomen.  A bladder infection occurs when bacteria from the vaginal · Weakness, dizziness or fainting  · Vaginal discharge  · Pain, redness or swelling in the labia (outer vaginal area)  © 0428-2107 The [address] [place] [address], [address] [place] [address]. All rights reserved.  This information is not intended a

## 2017-11-16 ENCOUNTER — PATIENT MESSAGE (OUTPATIENT)
Dept: GASTROENTEROLOGY | Facility: CLINIC | Age: 37
End: 2017-11-16

## 2017-11-17 ENCOUNTER — HOSPITAL ENCOUNTER (EMERGENCY)
Facility: HOSPITAL | Age: 37
Discharge: HOME | End: 2017-11-17
Attending: EMERGENCY MEDICINE
Payer: COMMERCIAL

## 2017-11-17 ENCOUNTER — APPOINTMENT (OUTPATIENT)
Dept: CT IMAGING | Facility: HOSPITAL | Age: 37
End: 2017-11-17
Attending: EMERGENCY MEDICINE
Payer: COMMERCIAL

## 2017-11-17 ENCOUNTER — TELEPHONE (OUTPATIENT)
Dept: GASTROENTEROLOGY | Facility: CLINIC | Age: 37
End: 2017-11-17

## 2017-11-17 VITALS
SYSTOLIC BLOOD PRESSURE: 136 MMHG | BODY MASS INDEX: 24.8 KG/M2 | HEIGHT: 63 IN | WEIGHT: 140 LBS | RESPIRATION RATE: 16 BRPM | OXYGEN SATURATION: 100 % | TEMPERATURE: 98 F | DIASTOLIC BLOOD PRESSURE: 77 MMHG | HEART RATE: 100 BPM

## 2017-11-17 DIAGNOSIS — R11.15 INTRACTABLE CYCLICAL VOMITING WITH NAUSEA: Primary | ICD-10-CM

## 2017-11-17 DIAGNOSIS — F19.10 DRUG ABUSE (HCC): ICD-10-CM

## 2017-11-17 PROCEDURE — 96361 HYDRATE IV INFUSION ADD-ON: CPT

## 2017-11-17 PROCEDURE — 96374 THER/PROPH/DIAG INJ IV PUSH: CPT

## 2017-11-17 PROCEDURE — 80076 HEPATIC FUNCTION PANEL: CPT | Performed by: EMERGENCY MEDICINE

## 2017-11-17 PROCEDURE — 87086 URINE CULTURE/COLONY COUNT: CPT | Performed by: EMERGENCY MEDICINE

## 2017-11-17 PROCEDURE — 96375 TX/PRO/DX INJ NEW DRUG ADDON: CPT

## 2017-11-17 PROCEDURE — 87186 SC STD MICRODIL/AGAR DIL: CPT | Performed by: EMERGENCY MEDICINE

## 2017-11-17 PROCEDURE — 83690 ASSAY OF LIPASE: CPT | Performed by: EMERGENCY MEDICINE

## 2017-11-17 PROCEDURE — 80048 BASIC METABOLIC PNL TOTAL CA: CPT | Performed by: EMERGENCY MEDICINE

## 2017-11-17 PROCEDURE — 93010 ELECTROCARDIOGRAM REPORT: CPT | Performed by: EMERGENCY MEDICINE

## 2017-11-17 PROCEDURE — 81025 URINE PREGNANCY TEST: CPT | Performed by: EMERGENCY MEDICINE

## 2017-11-17 PROCEDURE — 81001 URINALYSIS AUTO W/SCOPE: CPT | Performed by: EMERGENCY MEDICINE

## 2017-11-17 PROCEDURE — 80307 DRUG TEST PRSMV CHEM ANLYZR: CPT | Performed by: EMERGENCY MEDICINE

## 2017-11-17 PROCEDURE — 84484 ASSAY OF TROPONIN QUANT: CPT | Performed by: EMERGENCY MEDICINE

## 2017-11-17 PROCEDURE — 85025 COMPLETE CBC W/AUTO DIFF WBC: CPT | Performed by: EMERGENCY MEDICINE

## 2017-11-17 PROCEDURE — 93005 ELECTROCARDIOGRAM TRACING: CPT

## 2017-11-17 PROCEDURE — 99285 EMERGENCY DEPT VISIT HI MDM: CPT

## 2017-11-17 PROCEDURE — 74177 CT ABD & PELVIS W/CONTRAST: CPT | Performed by: EMERGENCY MEDICINE

## 2017-11-17 PROCEDURE — 87088 URINE BACTERIA CULTURE: CPT | Performed by: EMERGENCY MEDICINE

## 2017-11-17 PROCEDURE — 83735 ASSAY OF MAGNESIUM: CPT | Performed by: EMERGENCY MEDICINE

## 2017-11-17 RX ORDER — MORPHINE SULFATE 4 MG/ML
4 INJECTION, SOLUTION INTRAMUSCULAR; INTRAVENOUS ONCE
Status: COMPLETED | OUTPATIENT
Start: 2017-11-17 | End: 2017-11-17

## 2017-11-17 RX ORDER — DIPHENHYDRAMINE HYDROCHLORIDE 50 MG/ML
25 INJECTION INTRAMUSCULAR; INTRAVENOUS ONCE
Status: COMPLETED | OUTPATIENT
Start: 2017-11-17 | End: 2017-11-17

## 2017-11-17 RX ORDER — ONDANSETRON 2 MG/ML
4 INJECTION INTRAMUSCULAR; INTRAVENOUS ONCE
Status: COMPLETED | OUTPATIENT
Start: 2017-11-17 | End: 2017-11-17

## 2017-11-17 RX ORDER — METOCLOPRAMIDE HYDROCHLORIDE 5 MG/ML
10 INJECTION INTRAMUSCULAR; INTRAVENOUS ONCE
Status: COMPLETED | OUTPATIENT
Start: 2017-11-17 | End: 2017-11-17

## 2017-11-17 NOTE — TELEPHONE ENCOUNTER
Routed to hospital on-call MD    Pt sent a modu message yesterday.  I called the pt and emphasized that in the future for anything ACUTE she needs to call us right away and never send a [employer] message as those are for non-urgent questions only, she verb

## 2017-11-17 NOTE — TELEPHONE ENCOUNTER
Gustavo Andrade PA-C Yesterday (11:21 AM)         Rolin Pallas Morning Carie,   I'm am experiencing the vomiting & stomach aches again, the past 3 days (beginning Tuesday evening) have been quite difficult especially yesterday when I vomited at [address] [address]

## 2017-11-18 NOTE — TELEPHONE ENCOUNTER
From: Joslyn Tomas  To:  Martha Ayala [place]  Sent: 11/16/2017 11:21 AM CST  Subject: Non-Urgent Medical Question    Good Morning Carie,  I'm am experiencing the vomiting & stomach aches again, the past 3 days (beginning Tuesday evening) have been quite

## 2017-11-18 NOTE — ED INITIAL ASSESSMENT (HPI)
Patient complains of vomiting problem since June 2017, states she has had \"dizzy spells\" x3 days, states doctor told her to come to ER for evaluation, complains of abd pain and dizziness, denies cp/kim

## 2017-11-18 NOTE — ED NOTES
Patient resting comfortably, states she felt relief of pain from morphine but felt a twisting pain in her abdomen just now that has begun to subside.

## 2017-11-18 NOTE — ED PROVIDER NOTES
Patient Seen in: [facility] Emergency Department    History   Patient presents with:  Dizziness (neurologic)    Stated Complaint: Vomiting    HPI    Pt is 43 yo  who presents with cyclical vomiting ×3 days.   Patient states her vomiting was sli (Exact Date)   SpO2 100%   BMI 24.80 kg/m²         Physical Exam  GENERAL: No acute distress, awake and alert  HEENT: MMM, EOMI, PERRL  Neck: supple, non tender  CV: RRR, no murmurs  Resp: CTAB, no wheezes or retractions  Ab: soft, nontender, no distension RAINBOW DRAW LAVENDER TALL (BNP)   URINE CULTURE, ROUTINE     EKG    Rate, intervals and axes as noted on EKG Report.   Rate: 89  Rhythm: Sinus Rhythm  Reading: normal           ED Course as of Nov 17 2316  ------------------------------------------------

## 2017-11-20 NOTE — TELEPHONE ENCOUNTER
Pt contacted and reviewed PB's message below, she scheduled the following appts:  Future Appointments  Date Time Provider Yanelis Yulisa   11/22/2017 1:40 PM MD LIGIA [address]   11/29/2017 2:30 PM ERIKA Plascencia

## 2017-11-20 NOTE — TELEPHONE ENCOUNTER
Kimberly,     I agree with your triage advice - patient has been advised this in office as well that she is to call the office and we have reviewed ER signs and symptoms numerous times. Thank you for asking her to proceed to the ED.      \"Patient notified o

## 2017-11-21 ENCOUNTER — APPOINTMENT (OUTPATIENT)
Dept: GENERAL RADIOLOGY | Facility: HOSPITAL | Age: 37
DRG: 103 | End: 2017-11-21
Attending: EMERGENCY MEDICINE
Payer: COMMERCIAL

## 2017-11-21 ENCOUNTER — TELEPHONE (OUTPATIENT)
Dept: GASTROENTEROLOGY | Facility: CLINIC | Age: 37
End: 2017-11-21

## 2017-11-21 ENCOUNTER — HOSPITAL ENCOUNTER (INPATIENT)
Facility: HOSPITAL | Age: 37
LOS: 4 days | Discharge: HOME | DRG: 103 | End: 2017-11-25
Attending: EMERGENCY MEDICINE | Admitting: HOSPITALIST
Payer: COMMERCIAL

## 2017-11-21 DIAGNOSIS — E86.0 DEHYDRATION: ICD-10-CM

## 2017-11-21 DIAGNOSIS — N30.00 ACUTE CYSTITIS WITHOUT HEMATURIA: ICD-10-CM

## 2017-11-21 DIAGNOSIS — R11.15 INTRACTABLE CYCLICAL VOMITING WITH NAUSEA: Primary | ICD-10-CM

## 2017-11-21 DIAGNOSIS — E87.6 HYPOKALEMIA: ICD-10-CM

## 2017-11-21 PROBLEM — R79.89 AZOTEMIA: Status: ACTIVE | Noted: 2017-11-21

## 2017-11-21 PROBLEM — R73.9 HYPERGLYCEMIA: Status: ACTIVE | Noted: 2017-11-21

## 2017-11-21 PROCEDURE — 74020 XR ABDOMEN, OBSTRUCTIVE SERIES (CPT=74020): CPT | Performed by: EMERGENCY MEDICINE

## 2017-11-21 PROCEDURE — 99222 1ST HOSP IP/OBS MODERATE 55: CPT | Performed by: HOSPITALIST

## 2017-11-21 RX ORDER — LORAZEPAM 2 MG/ML
1 INJECTION INTRAMUSCULAR EVERY 6 HOURS PRN
Status: DISCONTINUED | OUTPATIENT
Start: 2017-11-21 | End: 2017-11-25

## 2017-11-21 RX ORDER — ONDANSETRON 2 MG/ML
4 INJECTION INTRAMUSCULAR; INTRAVENOUS ONCE
Status: COMPLETED | OUTPATIENT
Start: 2017-11-21 | End: 2017-11-21

## 2017-11-21 RX ORDER — DIPHENHYDRAMINE HYDROCHLORIDE 50 MG/ML
25 INJECTION INTRAMUSCULAR; INTRAVENOUS ONCE
Status: COMPLETED | OUTPATIENT
Start: 2017-11-21 | End: 2017-11-21

## 2017-11-21 RX ORDER — SODIUM CHLORIDE AND POTASSIUM CHLORIDE .9; .15 G/100ML; G/100ML
SOLUTION INTRAVENOUS CONTINUOUS
Status: DISCONTINUED | OUTPATIENT
Start: 2017-11-22 | End: 2017-11-25

## 2017-11-21 RX ORDER — METOCLOPRAMIDE HYDROCHLORIDE 5 MG/ML
10 INJECTION INTRAMUSCULAR; INTRAVENOUS EVERY 6 HOURS PRN
Status: DISCONTINUED | OUTPATIENT
Start: 2017-11-21 | End: 2017-11-22

## 2017-11-21 RX ORDER — FAMOTIDINE 10 MG/ML
20 INJECTION, SOLUTION INTRAVENOUS 2 TIMES DAILY
Status: DISCONTINUED | OUTPATIENT
Start: 2017-11-22 | End: 2017-11-24

## 2017-11-21 RX ORDER — LISINOPRIL 2.5 MG/1
2.5 TABLET ORAL DAILY
Status: DISCONTINUED | OUTPATIENT
Start: 2017-11-22 | End: 2017-11-25

## 2017-11-21 RX ORDER — HEPARIN SODIUM 5000 [USP'U]/ML
5000 INJECTION, SOLUTION INTRAVENOUS; SUBCUTANEOUS EVERY 8 HOURS
Status: DISCONTINUED | OUTPATIENT
Start: 2017-11-22 | End: 2017-11-25

## 2017-11-21 RX ORDER — MAGNESIUM HYDROXIDE/ALUMINUM HYDROXICE/SIMETHICONE 120; 1200; 1200 MG/30ML; MG/30ML; MG/30ML
30 SUSPENSION ORAL 4 TIMES DAILY PRN
Status: DISCONTINUED | OUTPATIENT
Start: 2017-11-21 | End: 2017-11-25

## 2017-11-21 RX ORDER — METOCLOPRAMIDE HYDROCHLORIDE 5 MG/ML
10 INJECTION INTRAMUSCULAR; INTRAVENOUS ONCE
Status: COMPLETED | OUTPATIENT
Start: 2017-11-21 | End: 2017-11-21

## 2017-11-21 RX ORDER — ONDANSETRON 2 MG/ML
INJECTION INTRAMUSCULAR; INTRAVENOUS
Status: COMPLETED
Start: 2017-11-21 | End: 2017-11-21

## 2017-11-21 RX ORDER — ONDANSETRON 2 MG/ML
4 INJECTION INTRAMUSCULAR; INTRAVENOUS EVERY 6 HOURS PRN
Status: DISCONTINUED | OUTPATIENT
Start: 2017-11-21 | End: 2017-11-22

## 2017-11-21 NOTE — TELEPHONE ENCOUNTER
Pt is requesting a call back from RN. Pt states she is having stomach pain and has been vomiting. Pt states she is not able to keep any food down.  Please call [phone] thank you

## 2017-11-21 NOTE — TELEPHONE ENCOUNTER
Discussed case w/ Dr. Aaron Vazquez. I advised the pt to present to the ED if sxs persist/worsen and she continues to be unable to keep any liquids or fluids down.  She states her fiancee is home w/ her and will bring her if sxs persist. She states she is trying

## 2017-11-21 NOTE — TELEPHONE ENCOUNTER
Routed to hospital on-call MD    Pt contacted and she states that she is still having stomach pain, nausea, and vomiting. States she feels slightly better after vomiting, slightly dizzy, and only able to have half sanford noodle soup and gatorade today. State

## 2017-11-22 PROCEDURE — 99233 SBSQ HOSP IP/OBS HIGH 50: CPT | Performed by: HOSPITALIST

## 2017-11-22 RX ORDER — DEXTROSE MONOHYDRATE 25 G/50ML
50 INJECTION, SOLUTION INTRAVENOUS AS NEEDED
Status: DISCONTINUED | OUTPATIENT
Start: 2017-11-22 | End: 2017-11-25

## 2017-11-22 RX ORDER — SODIUM CHLORIDE 9 MG/ML
INJECTION, SOLUTION INTRAVENOUS
Status: COMPLETED
Start: 2017-11-22 | End: 2017-11-22

## 2017-11-22 RX ORDER — METOCLOPRAMIDE HYDROCHLORIDE 5 MG/ML
10 INJECTION INTRAMUSCULAR; INTRAVENOUS EVERY 4 HOURS PRN
Status: DISCONTINUED | OUTPATIENT
Start: 2017-11-22 | End: 2017-11-25

## 2017-11-22 RX ORDER — POTASSIUM CHLORIDE 20 MEQ/1
40 TABLET, EXTENDED RELEASE ORAL ONCE
Status: COMPLETED | OUTPATIENT
Start: 2017-11-22 | End: 2017-11-22

## 2017-11-22 RX ORDER — ONDANSETRON 2 MG/ML
4 INJECTION INTRAMUSCULAR; INTRAVENOUS EVERY 4 HOURS PRN
Status: DISCONTINUED | OUTPATIENT
Start: 2017-11-22 | End: 2017-11-24

## 2017-11-22 NOTE — H&P
[address] [facility] [address] [facility] Patient Status:  Emergency    1980 MRN [id number]   Location [address] Attending Lis Porter MD   Hosp Day # 0 PCP Lyla Estrada MD     Date: apply Kit   No No   Si each by Does not apply route as needed for Other. Ondansetron HCl (ZOFRAN) 4 mg tablet   No No   Sig: Take 1 tablet (4 mg total) by mouth every 8 (eight) hours as needed for Nausea.    SITagliptin-MetFORMIN HCl ER (JANUMET XR) 5 pulses. Neurologic:  Alert, oriented, no focal deficits, cranial nerves II-XII are grossly intact. Cognition and Speech:  Oriented, speech clear and coherent. Psychiatric:  Cooperative, appropriate mood & affect.       Laboratory Data:     Lab Results  C

## 2017-11-22 NOTE — ED PROVIDER NOTES
Patient Seen in: [facility] AND [facility] Emergency Department    History   Patient presents with:  Nausea/Vomiting/Diarrhea (gastrointestinal)    Stated Complaint: vomiting for few days    HPI    Patient presents the emergency department complaining of vomitin Tympanic membrane and ear canal normal.   Left Ear: Tympanic membrane and ear canal normal.   Nose: Nose normal.   Mouth/Throat: Uvula is midline. Mucous membranes are dry. Eyes: Conjunctivae and EOM are normal.   Neck: Normal range of motion.  Neck suppl BLUE   RAINBOW DRAW LAVENDER   RAINBOW DRAW DARK GREEN   RAINBOW DRAW LIGHT GREEN   RAINBOW DRAW GOLD   RAINBOW DRAW LAVENDER TALL (BNP)   URINE CULTURE, ROUTINE     EKG    Rate, intervals and axes as noted on EKG Report.   Rate: 101 bpm  Rhythm: Sinus tach

## 2017-11-22 NOTE — PLAN OF CARE
Received 42year old female patient from ED. Alert and oriented x 4. Continues to be nauseous. Dr. Patricia Briones at bedside assessing patient. Oriented patient to room and use of call light. New orders carried out. Patient steady upon ambulation.   Will contin

## 2017-11-22 NOTE — PROGRESS NOTES
[facility]    Progress Note    Roberto Coulter Patient Status:  Inpatient    1980 MRN [id number]   Location [facility] 5SW/SE Attending Johnie Espitia MD   Hosp Day # 1 PCP Jahaira Mccarty MD       Subjective:   Lolita Hurtado HCl, Alum & Mag Hydroxide-Simcyndee, G.I. cocktail (Mylanta/dicyclomine/lidocaine 2% viscous), LORazepam    Results:     Lab Results  Component Value Date   WBC 5.2 11/22/2017   HGB 11.3 (L) 11/22/2017   HCT 32.1 (L) 11/22/2017    11/22/2017   GINGER attempt dietary challenge, diet advance as tolerated.     Acute cystitis  Patient started on Rocephin 1 g IV piggyback every 24 hours, will continue the same.   Cultures pending at this time.     Severe hypokalemia  Likely secondary to repeated emesis, we w

## 2017-11-23 PROCEDURE — 99233 SBSQ HOSP IP/OBS HIGH 50: CPT | Performed by: HOSPITALIST

## 2017-11-23 RX ORDER — MAGNESIUM OXIDE 400 MG (241.3 MG MAGNESIUM) TABLET
400 TABLET ONCE
Status: COMPLETED | OUTPATIENT
Start: 2017-11-23 | End: 2017-11-23

## 2017-11-23 RX ORDER — 0.9 % SODIUM CHLORIDE 0.9 %
VIAL (ML) INJECTION
Status: DISPENSED
Start: 2017-11-23 | End: 2017-11-24

## 2017-11-23 RX ORDER — 0.9 % SODIUM CHLORIDE 0.9 %
VIAL (ML) INJECTION
Status: COMPLETED
Start: 2017-11-23 | End: 2017-11-23

## 2017-11-23 RX ORDER — POTASSIUM CHLORIDE 20 MEQ/1
40 TABLET, EXTENDED RELEASE ORAL EVERY 4 HOURS
Status: COMPLETED | OUTPATIENT
Start: 2017-11-23 | End: 2017-11-23

## 2017-11-23 NOTE — PROGRESS NOTES
[facility]    Progress Note    Joslyn Tomas Patient Status:  Inpatient    1980 MRN [id number]   Location [facility] 5SW/SE Attending Kelly Roque MD   Hosp Day # 2 PCP Cristhian Melgar MD       Subjective:   Mann Lopez PRN Inpatient Meds:      dextrose 50%, Glucose-Vitamin C, ondansetron HCl, Metoclopramide HCl, Alum & Mag Hydroxide-Simeth, LORazepam    Results:       Lab Results  Component Value Date   WBC 4.5 11/23/2017   HGB 11.9 (L) 11/23/2017   HCT 33.4 (L) 11/23/20 of 11/17/2017 19:57:06 No significant changes have occurred Electronically signed on 11/21/2017 at 21:29 by Jc Cadena DO    Assessment and Plan:   Cyclical vomiting syndrome  ? 2/2 marijuana use though she only admits to using it sparingly  ?  Related t

## 2017-11-23 NOTE — PLAN OF CARE
I received phone call from telemetry reporting that patient had 36 beats of V-tach with heart rate ranging from 187 to 214. Patient was immediately assessed and reported that she was in restroom trying to have a bowel movement.  Patient denied any type of c

## 2017-11-23 NOTE — PLAN OF CARE
GASTROINTESTINAL - ADULT    • Minimal or absence of nausea and vomiting Not Progressing    • Maintains adequate nutritional intake (undernourished) Not Progressing          Diabetes/Glucose Control    • Glucose maintained within prescribed range Progressin

## 2017-11-23 NOTE — PLAN OF CARE
Notified Dr. Marilu Real to inform of Urine culture results that showed greater than 100,000 Escherichia Coli. No further orders obtained. Patient is presently receiving Rocephin iv every 24 hours as scheduled.

## 2017-11-23 NOTE — PLAN OF CARE
Problem: Diabetes/Glucose Control  Goal: Glucose maintained within prescribed range  INTERVENTIONS:  - Monitor Blood Glucose as ordered  - Assess for signs and symptoms of hyperglycemia and hypoglycemia  - Administer ordered medications to maintain glucose Outcome: Progressing      Problem: METABOLIC/FLUID AND ELECTROLYTES - ADULT  Goal: Electrolytes maintained within normal limits  INTERVENTIONS:  - Monitor labs and rhythm and assess patient for signs and symptoms of electrolyte imbalances  - Administer e pain management  - Manage/alleviate anxiety  - Utilize distraction and/or relaxation techniques  - Monitor for opioid side effects  - Notify MD/LIP if interventions unsuccessful or patient reports new pain  - Anticipate increased pain with activity and pre needs related to functional status, cognitive ability or social support system   Outcome: Progressing

## 2017-11-23 NOTE — PLAN OF CARE
Problem: Diabetes/Glucose Control  Goal: Glucose maintained within prescribed range  INTERVENTIONS:  - Monitor Blood Glucose as ordered  - Assess for signs and symptoms of hyperglycemia and hypoglycemia  - Administer ordered medications to maintain glucose Nasogastric tube to low intermittent suction as ordered  - Evaluate effectiveness of ordered antiemetic medications  - Provide nonpharmacologic comfort measures as appropriate  - Advance diet as tolerated, if ordered  - Obtain nutritional consult as needed pain and request assistance  - Assess pain using appropriate pain scale  - Administer analgesics based on type and severity of pain and evaluate response  - Implement non-pharmacological measures as appropriate and evaluate response  - Consider cultural an and tolerates it well. (4) Call light within reach and patient instructed to use call light for assistance. (5) Hourly rounds are done to ensure safety.     Problem: DISCHARGE PLANNING  Goal: Discharge to home or other facility with appropriate resources

## 2017-11-24 PROCEDURE — 99254 IP/OBS CNSLTJ NEW/EST MOD 60: CPT | Performed by: INTERNAL MEDICINE

## 2017-11-24 RX ORDER — MAGNESIUM OXIDE 400 MG (241.3 MG MAGNESIUM) TABLET
400 TABLET ONCE
Status: COMPLETED | OUTPATIENT
Start: 2017-11-24 | End: 2017-11-24

## 2017-11-24 RX ORDER — ONDANSETRON 2 MG/ML
4 INJECTION INTRAMUSCULAR; INTRAVENOUS EVERY 4 HOURS
Status: DISCONTINUED | OUTPATIENT
Start: 2017-11-24 | End: 2017-11-25

## 2017-11-24 RX ORDER — DICYCLOMINE HYDROCHLORIDE 10 MG/ML
20 INJECTION INTRAMUSCULAR 4 TIMES DAILY PRN
Status: DISCONTINUED | OUTPATIENT
Start: 2017-11-24 | End: 2017-11-25

## 2017-11-24 NOTE — CONSULTS
[facility]  Report of GI Consultation    Alessandra Steinberg Patient Status:  Inpatient    1980 MRN [id number]   Location [facility] 5SW/SE Attending Khadijah Sandy MD   Hosp Day # 3 PCP Ashley Martinez MD     Date of Admiss CT scan 11/17/2017 shows IUD device. Nuclear Medicine gastric emptying study was performed 10/31/2017.     Ms. Sujatha Roberts presented to the Emergency Department here 6:50 PM 11/21/2017 complaining of dizziness and at least 8 episodes of vomiting past 3 day EGD      Comment: [id number]    Family History  Family History   Problem Relation Age of Onset   • Hypertension Mother    • Diabetes Father    • Musculo-skelatal Disorder Father      ALS       Social History  Patient Guardian Status:  Not on file.     Oth In Vitro Strip USE TWICE DAILY AS DIRECTED   FREESTYLE SYSTEM Does not apply Kit 1 each by Does not apply route as needed for Other.    FREESTYLE LANCETS Does not apply Misc dm       Allergies  No Known Allergies    Review of Systems:   12 point review of s Significant sleep difficulties with the stress including awakening with racing thoughts as above.     This recurring vomiting is likely multifactorial.  Possibilities include severe stress and anxiety, marijuana use, less likely a component of cyclic vomiti

## 2017-11-24 NOTE — PAYOR COMM NOTE
--------------  ADMISSION REVIEW     Payor: LISA GONZALEZ  Subscriber #:  [id number]  Authorization Number: [id number]    Admit date: 11/21/17  Admit time: 2327       Admitting Physician: Sp Rodriguez MD  Attending Physician:  Marcyruz Bardales MD  [facility] HPI.  Constitutional and vital signs reviewed. All other systems reviewed and negative except as noted above.     Physical Exam   ED Triage Vitals [11/21/17 0994]  BP: 154/95  Pulse: 110  Resp: 20  Temp: 98.2 °F (36.8 °C)  Temp src: Oral  SpO2: 100 % Potassium 2.6 (*)     Creatinine 0.45 (*)     BUN/CREA Ratio 22.2 (*)     All other components within normal limits   CBC W/ DIFFERENTIAL - Normal   CBC WITH DIFFERENTIAL WITH PLATELET    Narrative:      The following orders were created for panel order CBC Prescribed:  Current Discharge Medication List        Present on Admission  Date Reviewed: 11/19/2016          ICD-10-CM Noted POA    Azotemia R79.89 11/21/2017 Yes    Hyperglycemia R73.9 11/21/2017 Yes    Intractable cyclical vomiting with nausea G43. A1 1 Past Surgical History:  No date:   No date: CHOLECYSTECTOMY  2002: EGD      Comment: [facility] [place], operated by [facility]  Family History   Problem Relation Age of Onset   • Hypertension Mother    • Diabetes Father    • Musculo-skelatal Disorder Father      ALS Alert and oriented. Diffuse skin problem:  None. Eye:  Pupils are equal, round and reactive to light, extraocular movements are intact, Normal conjunctiva. HENT:  Normocephalic, oral mucosa is moist.  Head:  Normocephalic, atraumatic.   Neck:  Supple, no now.    Uncontrolled diabetes with associated nephropathy  Sugars suboptimally controlled, will hold metformin for now, use sliding scale coverage as needed. Continue ACE inhibitor for proteinuria.     Prophylaxis  Subcutaneous heparin    CODE STATUS  Full RN      LORazepam (ATIVAN) injection 1 mg     Date Action Dose Route User    11/24/2017 1236 Given 1 mg Intravenous Aleena Dunn, RN    11/24/2017 0602 Given 1 mg Intravenous Lexiege Merle, RN    11/23/2017 2138 Given 1 mg Intravenous Kierra Olya

## 2017-11-24 NOTE — PROGRESS NOTES
[facility]    Progress Note    Alessandra Maryan Patient Status:  Inpatient    1980 MRN [id number]   Location [facility] 5SW/SE Attending Khadijah Sandy MD   Hosp Day # 3 PCP Ashley Martinez MD       Subjective:   Vasiliy Wells Current PRN Inpatient Meds:      Dicyclomine HCl, dextrose 50%, Glucose-Vitamin C, Metoclopramide HCl, Alum & Mag Hydroxide-Simeth, LORazepam    Results:       Lab Results  Component Value Date   WBC 5.0 11/24/2017   HGB 12.0 11/24/2017   HCT 34.4 (L EGD, GES and CT  Discussed marijuana cessation  Cont IVF    E coli UTI  Rocephin 1 g IV piggyback every 24 hours  Multiple resistances - MacroBID on d/c when can tolerate PO     Severe hypokalemia  Likely secondary to repeated emesis  electrolyte replaceme

## 2017-11-25 VITALS
BODY MASS INDEX: 24.12 KG/M2 | HEIGHT: 62.99 IN | RESPIRATION RATE: 20 BRPM | SYSTOLIC BLOOD PRESSURE: 147 MMHG | WEIGHT: 136.13 LBS | TEMPERATURE: 98 F | OXYGEN SATURATION: 99 % | HEART RATE: 112 BPM | DIASTOLIC BLOOD PRESSURE: 98 MMHG

## 2017-11-25 PROCEDURE — 99239 HOSP IP/OBS DSCHRG MGMT >30: CPT | Performed by: HOSPITALIST

## 2017-11-25 PROCEDURE — 99232 SBSQ HOSP IP/OBS MODERATE 35: CPT | Performed by: INTERNAL MEDICINE

## 2017-11-25 RX ORDER — NITROFURANTOIN 25; 75 MG/1; MG/1
100 CAPSULE ORAL 2 TIMES DAILY
Qty: 6 CAPSULE | Refills: 0 | Status: SHIPPED | OUTPATIENT
Start: 2017-11-25 | End: 2017-11-29

## 2017-11-25 RX ORDER — PROCHLORPERAZINE 25 MG
25 SUPPOSITORY, RECTAL RECTAL EVERY 12 HOURS PRN
Qty: 30 SUPPOSITORY | Refills: 0 | Status: SHIPPED | OUTPATIENT
Start: 2017-11-25 | End: 2017-11-29

## 2017-11-25 RX ORDER — LISINOPRIL 2.5 MG/1
2.5 TABLET ORAL DAILY
Qty: 30 TABLET | Refills: 0 | Status: SHIPPED | OUTPATIENT
Start: 2017-11-25 | End: 2021-05-10 | Stop reason: WASHOUT

## 2017-11-25 RX ORDER — PROCHLORPERAZINE 25 MG
25 SUPPOSITORY, RECTAL RECTAL EVERY 12 HOURS PRN
Status: DISCONTINUED | OUTPATIENT
Start: 2017-11-25 | End: 2017-11-25

## 2017-11-25 RX ORDER — DIPHENHYDRAMINE HCL 25 MG
25 CAPSULE ORAL NIGHTLY PRN
Refills: 0 | Status: SHIPPED | COMMUNITY
Start: 2017-11-25 | End: 2017-11-29

## 2017-11-25 RX ORDER — DIPHENHYDRAMINE HCL 25 MG
25 CAPSULE ORAL NIGHTLY PRN
Status: DISCONTINUED | OUTPATIENT
Start: 2017-11-25 | End: 2017-11-25

## 2017-11-25 RX ORDER — NITROFURANTOIN 25; 75 MG/1; MG/1
100 CAPSULE ORAL 2 TIMES DAILY
Status: DISCONTINUED | OUTPATIENT
Start: 2017-11-25 | End: 2017-11-25

## 2017-11-25 RX ORDER — MAGNESIUM SULFATE HEPTAHYDRATE 40 MG/ML
2 INJECTION, SOLUTION INTRAVENOUS ONCE
Status: COMPLETED | OUTPATIENT
Start: 2017-11-25 | End: 2017-11-25

## 2017-11-25 NOTE — PROGRESS NOTES
[facility]  GI SERVICE PROGRESS NOTE    Reagan Cornejo Patient Status:  Inpatient    1980 MRN [id number]   Location [facility] 5SW/SE Attending Rowan Aparicio MD   Hosp Day # 4 PCP Surendra Henson MD       Subjective: for input(s): GLORY, LIP in the last 72 hours. Recent Labs   Lab  11/23/17   0518  11/24/17   0533  11/25/17   0541   MG  1.6*  1.7*  1.8       No results for input(s): URINE, CULTI, BLDSMR in the last 72 hours.               Assessment and Plan:     39-ye prescription for ondansetron prior to admission.   Take the ondansetron before meals for now.  Asael Long MD

## 2017-11-25 NOTE — PROGRESS NOTES
[facility]    Progress Note    Radha Alexis Patient Status:  Inpatient    1980 MRN [id number]   Location [facility] 5SW/SE Attending Cy Fleming MD   Hosp Day # 4 PCP Vickie Falcon MD       Subjective:   Gloria Juarez Glucose-Vitamin C, Alum & Mag Hydroxide-Simeth, LORazepam    Results:       Lab Results  Component Value Date   WBC 5.8 11/25/2017   HGB 12.6 11/25/2017   HCT 35.8 11/25/2017    11/25/2017   CREATSERUM 0.61 11/25/2017   BUN 3 (L) 11/25/2017    recent EGD, GES and CT  Discussed marijuana cessation  Gave IVF  Home today If katharine diet    E coli UTI  Rocephin 1 g IV piggyback every 24 hours  Multiple resistances - MacroBID on d/c when can tolerate PO     Severe hypokalemia  Likely secondary to repeate

## 2017-11-25 NOTE — PLAN OF CARE
Problem: Diabetes/Glucose Control  Goal: Glucose maintained within prescribed range  INTERVENTIONS:  - Monitor Blood Glucose as ordered  - Assess for signs and symptoms of hyperglycemia and hypoglycemia  - Administer ordered medications to maintain glucose Assist with meals as needed  - Monitor I&O, WT and lab values  - Obtain nutritional consult as needed  - Optimize oral hygiene and moisture  - Encourage food from home; allow for food preferences  - Enhance eating environment   Outcome: Progressing  Patien intake to prevent dehydration.      Problem: PAIN - ADULT  Goal: Verbalizes/displays adequate comfort level or patient's stated pain goal  INTERVENTIONS:  - Encourage pt to monitor pain and request assistance  - Assess pain using appropriate pain scale  - A discharge w/pt and caregiver  - Include patient/family/discharge partner in discharge planning  - Arrange for needed discharge resources and transportation as appropriate  - Identify discharge learning needs (meds, wound care, etc)  - Arrange for interpret

## 2017-11-26 ENCOUNTER — TELEPHONE (OUTPATIENT)
Dept: CARDIOLOGY UNIT | Facility: HOSPITAL | Age: 37
End: 2017-11-26

## 2017-11-26 NOTE — DISCHARGE SUMMARY
[facility]    Discharge Summary    Amie Raman Patient Status:  Inpatient    1980 MRN [id number]   Location [facility] 5SW/SE Attending No att. providers found   Hosp Day # 4 PCP Nav Sanches MD     Date of Admissio cessation  Gave IVF  Home today If katharine diet     E coli UTI  Rocephin 1 g IV piggyback every 24 hours  Multiple resistances - MacroBID on d/c when can tolerate PO     Severe hypokalemia  Likely secondary to repeated emesis  electrolyte replacement protocol capsule  Refills:  0     prochlorperazine 25 MG Supp  Commonly known as:  COMPAZINE      Place 1 suppository (25 mg total) rectally every 12 (twelve) hours as needed.    Quantity:  30 suppository  Refills:  0        CHANGE how you take these medications Discharge Instructions: None  >30 MIN SPENT ON THIS DC   BIENVENIDO HERNANDEZ  11/26/2017  12:28 PM

## 2017-11-27 ENCOUNTER — PATIENT OUTREACH (OUTPATIENT)
Dept: CASE MANAGEMENT | Age: 37
End: 2017-11-27

## 2017-11-27 DIAGNOSIS — Z02.9 ENCOUNTERS FOR ADMINISTRATIVE PURPOSE: ICD-10-CM

## 2017-11-27 NOTE — PROGRESS NOTES
Initial Post Discharge Follow Up   Discharge Date: 11/25/17  Contact Date: 11/27/2017    Consent Verification:  Assessment Completed With: Patient  HIPAA Verified?   Yes    Discharge Dx:   Cyclic vomiting syndrome    General:   • How have you been since (two) times daily. Disp: 6 capsule Rfl: 0   Ondansetron HCl (ZOFRAN) 4 mg tablet Take 1 tablet (4 mg total) by mouth every 8 (eight) hours as needed for Nausea.  Disp: 30 tablet Rfl: 1   ergocalciferol 65781 units Oral Cap Take 1 capsule (50,000 Units total Follow up appointments:  Reviewed recommended follow up appointments. Pt is scheduled to FU with GI on 11/29/17. Pt is scheduled to see PCP on 12/5/17. Supervisor is being contacted to discuss visit type. KEITH emailed supervisor. KEITH to FU.       Your ap contact the office with any further questions or concerns. Supervisor was contacted to discuss visit type. Pt requested to keep her [place] PCP for 12/5/17. NCM to FU.        [x]  Discharge Summary, Discharge medications reviewed/discussed/and reconciled with p

## 2017-11-28 NOTE — PAYOR COMM NOTE
--------------  DISCHARGE REVIEW    Payor: LISA VICKI  Subscriber #:  [id number]  Authorization Number: [id number]    Admit date: 11/21/17  Admit time:  2327  Discharge Date: 11/25/2017  3:11 PM     Admitting Physician: Shawn Cedillo MD  Attending Physi mouth.  She also claims to feeling extremely dizzy unable to ambulate without support.   She denies any associated shortness of breath palpitations or focal weakness however does complain of some abdominal discomfort particularly when retching.       AGAPITO Correlate for constipation. 3. Status post cholecystectomy. There are no significant changes to the preliminary report provided by [facility].            Discharge Medications:      Discharge Medications      START taking these medications      Inst Quantity:  30 tablet  Refills:  1           Where to Get Your Medications      Please  your prescriptions at the location directed by your doctor or nurse    Bring a paper prescription for each of these medications  · lisinopril 2.5 MG Tabs  · Nitro

## 2017-11-29 ENCOUNTER — OFFICE VISIT (OUTPATIENT)
Dept: GASTROENTEROLOGY | Facility: CLINIC | Age: 37
End: 2017-11-29

## 2017-11-29 VITALS
DIASTOLIC BLOOD PRESSURE: 75 MMHG | HEIGHT: 62.5 IN | BODY MASS INDEX: 23.97 KG/M2 | HEART RATE: 101 BPM | WEIGHT: 133.63 LBS | SYSTOLIC BLOOD PRESSURE: 118 MMHG | TEMPERATURE: 98 F

## 2017-11-29 DIAGNOSIS — R11.15 INTRACTABLE CYCLICAL VOMITING WITH NAUSEA: Primary | ICD-10-CM

## 2017-11-29 DIAGNOSIS — Z09 HOSPITAL DISCHARGE FOLLOW-UP: ICD-10-CM

## 2017-11-29 PROCEDURE — 99212 OFFICE O/P EST SF 10 MIN: CPT | Performed by: PHYSICIAN ASSISTANT

## 2017-11-29 PROCEDURE — 99214 OFFICE O/P EST MOD 30 MIN: CPT | Performed by: PHYSICIAN ASSISTANT

## 2017-11-29 RX ORDER — PANTOPRAZOLE SODIUM 40 MG/1
40 TABLET, DELAYED RELEASE ORAL
Qty: 14 TABLET | Refills: 0 | Status: SHIPPED | OUTPATIENT
Start: 2017-11-29 | End: 2017-12-13

## 2017-11-29 NOTE — PROGRESS NOTES
no h/o head & neck cancers  no h/o difficult intubations  no h/o malignant hyperthermia  no psuedocholinesterase deficiencies  yes h/o active illicit drug use/opioid abuse  no home O2  no h/o sleep apnea  no active alcoholics   no dialysis patients  no s

## 2017-11-29 NOTE — PATIENT INSTRUCTIONS
1. Pantoprazole 40 mg each day - 30 minutes before breakfast.    2. Zofran as needed. 3. No smoking cigarettes or marijuana.     Avoid Heartburn Triggers  - Laying down after meals (sit upright for at least 2-3 hours after eating meals)  - Large meals (s

## 2017-11-29 NOTE — PROGRESS NOTES
[facility] Follow-up Visit    Sindy sleep. Patient states the last 3 nights she has not been having vomiting during the day, but has vomiting at night. Hot showers help with this. Has not smoked marijuana, cigarettes and alcohol since November 13th.  Her symptoms have \"kind of improved\" sin started feeling \"unwell\".  She describes nausea without vomiting and a \"dull ache in the mid abdomen\".  Approximately 2 weeks ago, patient began vomiting + 5 times in a day.  She describes her emesis as acidic and at times bilious.  She then developed Diabetes ([facility] [address]    • Hyperlipidemia       Past Surgical History:  No date:   No date: CHOLECYSTECTOMY  2002: EGD      Comment: [facility] [place]   Family History   Problem Relation Age of Onset   • Hypertension Mother    • Diabetes Father    • Musculo-s +recurrent UTI  SKIN:  negative for  rash  ALLERGIC/IMMUNOLOGIC:  negative for hay fever  ENDOCRINE:  negative for cold intolerance and heat intolerance +DMII  MUSCULOSKELETAL:  negative for  joint stiffness and joint swelling  BEHAVIOR/PSYCH:  +tearful non-specific H. Pylori gastritis in the stomach without residual food matter, negative NM gastric emptying scan. She has denied trauma to her head previously.  CT imaging on this admission + for IUD device, significant stool burden and possible hepatic stea This Visit:    Signed Prescriptions Disp Refills    Pantoprazole Sodium 40 MG Oral Tab EC 14 tablet 0      Sig: Take 1 tablet (40 mg total) by mouth every morning before breakfast.           Imaging & Referrals:  None     CC: Dr. Felipa Licea.  Lupe BUCHANAN

## 2017-12-01 ENCOUNTER — TELEPHONE (OUTPATIENT)
Dept: GASTROENTEROLOGY | Facility: CLINIC | Age: 37
End: 2017-12-01

## 2017-12-01 ENCOUNTER — OFFICE VISIT (OUTPATIENT)
Dept: INTERNAL MEDICINE CLINIC | Facility: CLINIC | Age: 37
End: 2017-12-01

## 2017-12-01 VITALS
TEMPERATURE: 99 F | HEIGHT: 62.5 IN | WEIGHT: 136.38 LBS | SYSTOLIC BLOOD PRESSURE: 117 MMHG | OXYGEN SATURATION: 100 % | HEART RATE: 99 BPM | DIASTOLIC BLOOD PRESSURE: 75 MMHG | BODY MASS INDEX: 24.47 KG/M2

## 2017-12-01 DIAGNOSIS — Z02.89 ENCOUNTER FOR COMPLETION OF FORM WITH PATIENT: ICD-10-CM

## 2017-12-01 DIAGNOSIS — G43.A0 CYCLICAL VOMITING WITH NAUSEA, INTRACTABILITY OF VOMITING NOT SPECIFIED: ICD-10-CM

## 2017-12-01 DIAGNOSIS — N39.0 URINARY TRACT INFECTION WITHOUT HEMATURIA, SITE UNSPECIFIED: ICD-10-CM

## 2017-12-01 DIAGNOSIS — IMO0001 TRANSITION OF CARE PERFORMED WITH SHARING OF CLINICAL SUMMARY: Primary | ICD-10-CM

## 2017-12-01 DIAGNOSIS — E11.29 TYPE 2 DIABETES MELLITUS WITH MICROALBUMINURIA, WITHOUT LONG-TERM CURRENT USE OF INSULIN (HCC): ICD-10-CM

## 2017-12-01 DIAGNOSIS — R80.9 TYPE 2 DIABETES MELLITUS WITH MICROALBUMINURIA, WITHOUT LONG-TERM CURRENT USE OF INSULIN (HCC): ICD-10-CM

## 2017-12-01 DIAGNOSIS — A59.9 TRICHOMONIASIS: ICD-10-CM

## 2017-12-01 PROCEDURE — 99495 TRANSJ CARE MGMT MOD F2F 14D: CPT | Performed by: INTERNAL MEDICINE

## 2017-12-01 PROCEDURE — 81003 URINALYSIS AUTO W/O SCOPE: CPT | Performed by: INTERNAL MEDICINE

## 2017-12-01 RX ORDER — ALPRAZOLAM 0.25 MG/1
0.25 TABLET ORAL EVERY 6 HOURS PRN
Qty: 30 TABLET | Refills: 0 | Status: SHIPPED | OUTPATIENT
Start: 2017-12-01 | End: 2020-12-07

## 2017-12-01 NOTE — TELEPHONE ENCOUNTER
Pt calling to advise that she had her appt today, just an fyi, no need to call. Any questions call at:[phone],thanks.

## 2017-12-02 PROBLEM — A59.9 TRICHOMONIASIS: Status: ACTIVE | Noted: 2017-12-02

## 2017-12-06 ENCOUNTER — TELEPHONE (OUTPATIENT)
Dept: INTERNAL MEDICINE CLINIC | Facility: CLINIC | Age: 37
End: 2017-12-06

## 2017-12-06 NOTE — TELEPHONE ENCOUNTER
Paperwork, previous  Labs results, and visit notes  provided to Dr Karine Ortega for signature and review, Patient has not completed any of her questions or signatures required on the form

## 2017-12-08 NOTE — TELEPHONE ENCOUNTER
Kalani has the paperwork, per conversation with Kalani.  Patient has not responded to any of Kalani's calls regarding the paperwork

## 2017-12-08 NOTE — TELEPHONE ENCOUNTER
Message left for the patient on her voicemail to call the office to  her paperwork, she has her sections to complete

## 2017-12-21 ENCOUNTER — TELEPHONE (OUTPATIENT)
Dept: INTERNAL MEDICINE CLINIC | Facility: CLINIC | Age: 37
End: 2017-12-21

## 2019-03-05 ENCOUNTER — OFFICE VISIT (OUTPATIENT)
Dept: OBGYN CLINIC | Facility: CLINIC | Age: 39
End: 2019-03-05
Payer: COMMERCIAL

## 2019-03-05 VITALS
DIASTOLIC BLOOD PRESSURE: 76 MMHG | WEIGHT: 182.38 LBS | HEIGHT: 62.5 IN | BODY MASS INDEX: 32.72 KG/M2 | SYSTOLIC BLOOD PRESSURE: 122 MMHG

## 2019-03-05 DIAGNOSIS — Z11.3 ROUTINE SCREENING FOR STI (SEXUALLY TRANSMITTED INFECTION): ICD-10-CM

## 2019-03-05 DIAGNOSIS — Z12.39 ENCOUNTER FOR SCREENING BREAST EXAMINATION: ICD-10-CM

## 2019-03-05 DIAGNOSIS — T83.32XA INTRAUTERINE CONTRACEPTIVE DEVICE THREADS LOST, INITIAL ENCOUNTER: Primary | ICD-10-CM

## 2019-03-05 DIAGNOSIS — N89.8 VAGINAL DISCHARGE: ICD-10-CM

## 2019-03-05 DIAGNOSIS — R10.30 LOWER ABDOMINAL PAIN: ICD-10-CM

## 2019-03-05 LAB
CONTROL LINE PRESENT WITH A CLEAR BACKGROUND (YES/NO): YES YES/NO
KIT LOT #: NORMAL NUMERIC

## 2019-03-05 PROCEDURE — 99203 OFFICE O/P NEW LOW 30 MIN: CPT | Performed by: ADVANCED PRACTICE MIDWIFE

## 2019-03-05 PROCEDURE — 81025 URINE PREGNANCY TEST: CPT | Performed by: ADVANCED PRACTICE MIDWIFE

## 2019-03-06 DIAGNOSIS — N76.0 VAGINAL INFECTION: Primary | ICD-10-CM

## 2019-03-06 LAB
C TRACH DNA SPEC QL NAA+PROBE: NEGATIVE
N GONORRHOEA DNA SPEC QL NAA+PROBE: NEGATIVE

## 2019-03-06 RX ORDER — METRONIDAZOLE 500 MG/1
500 TABLET ORAL 2 TIMES DAILY
Qty: 14 TABLET | Refills: 0 | Status: SHIPPED | OUTPATIENT
Start: 2019-03-06 | End: 2019-03-13

## 2019-03-06 RX ORDER — FLUCONAZOLE 150 MG/1
150 TABLET ORAL ONCE
Qty: 2 TABLET | Refills: 0 | Status: SHIPPED | OUTPATIENT
Start: 2019-03-06 | End: 2019-03-06

## 2019-03-06 NOTE — PROGRESS NOTES
HPI:    Patient ID: Glen Mclean is a 34year old female. Saw an MD in November and was told that she could not see the string from her IUD.   That provider is too far away so she is changing care to [place].  Now she is having very strong cramps o Rfl: 0     Allergies:No Known Allergies   PHYSICAL EXAM:   Physical Exam  Vulva - normal female w/o edema, erythema or lesion  Vagina - heavy yellow/white d/c no odor noted  CX - mucus d/c no string visible  UT - AV, NT w/o mass  AD - NT w/o mass         A

## 2019-03-07 ENCOUNTER — TELEPHONE (OUTPATIENT)
Dept: OBGYN CLINIC | Facility: CLINIC | Age: 39
End: 2019-03-07

## 2019-03-07 ENCOUNTER — PATIENT MESSAGE (OUTPATIENT)
Dept: OBGYN CLINIC | Facility: CLINIC | Age: 39
End: 2019-03-07

## 2019-03-07 DIAGNOSIS — N76.0 BV (BACTERIAL VAGINOSIS): Primary | ICD-10-CM

## 2019-03-07 DIAGNOSIS — B96.89 BV (BACTERIAL VAGINOSIS): Primary | ICD-10-CM

## 2019-03-07 LAB
GENITAL VAGINOSIS SCREEN: POSITIVE
TRICHOMONAS SCREEN: POSITIVE

## 2019-03-07 RX ORDER — FLUCONAZOLE 150 MG/1
150 TABLET ORAL ONCE
Qty: 2 TABLET | Refills: 0 | OUTPATIENT
Start: 2019-03-07 | End: 2019-03-07

## 2019-03-07 NOTE — TELEPHONE ENCOUNTER
Informed pt of message below. Pt voices understanding to all the information below      ----- Message from RASHEL Chu sent at 3/6/2019  8:38 AM CST -----  Test is positive for both BV and Trichomonas.   Explain to pt that trichomonas is an STI and

## 2019-03-08 NOTE — TELEPHONE ENCOUNTER
Pt called and informed of message below.     ----- Message from RASHEL Lee sent at 3/6/2019  1:44 PM CST -----  Negative Chlamydia/GC please notify patient

## 2019-03-13 ENCOUNTER — LAB ENCOUNTER (OUTPATIENT)
Dept: LAB | Facility: HOSPITAL | Age: 39
End: 2019-03-13
Attending: ADVANCED PRACTICE MIDWIFE
Payer: COMMERCIAL

## 2019-03-13 ENCOUNTER — HOSPITAL ENCOUNTER (OUTPATIENT)
Dept: ULTRASOUND IMAGING | Facility: HOSPITAL | Age: 39
Discharge: HOME | End: 2019-03-13
Attending: ADVANCED PRACTICE MIDWIFE
Payer: COMMERCIAL

## 2019-03-13 DIAGNOSIS — T83.32XA INTRAUTERINE CONTRACEPTIVE DEVICE THREADS LOST, INITIAL ENCOUNTER: ICD-10-CM

## 2019-03-13 DIAGNOSIS — Z11.3 ROUTINE SCREENING FOR STI (SEXUALLY TRANSMITTED INFECTION): ICD-10-CM

## 2019-03-13 LAB
HBV SURFACE AG SER-ACNC: 0.21 [IU]/L
HBV SURFACE AG SERPL QL IA: NONREACTIVE
HCV AB SERPL QL IA: NONREACTIVE

## 2019-03-13 PROCEDURE — 86803 HEPATITIS C AB TEST: CPT

## 2019-03-13 PROCEDURE — 36415 COLL VENOUS BLD VENIPUNCTURE: CPT

## 2019-03-13 PROCEDURE — 87389 HIV-1 AG W/HIV-1&-2 AB AG IA: CPT

## 2019-03-13 PROCEDURE — 76856 US EXAM PELVIC COMPLETE: CPT | Performed by: ADVANCED PRACTICE MIDWIFE

## 2019-03-13 PROCEDURE — 86780 TREPONEMA PALLIDUM: CPT

## 2019-03-13 PROCEDURE — 76830 TRANSVAGINAL US NON-OB: CPT | Performed by: ADVANCED PRACTICE MIDWIFE

## 2019-03-13 PROCEDURE — 87340 HEPATITIS B SURFACE AG IA: CPT

## 2019-03-15 ENCOUNTER — TELEPHONE (OUTPATIENT)
Dept: OBGYN CLINIC | Facility: CLINIC | Age: 39
End: 2019-03-15

## 2019-03-15 LAB — T PALLIDUM AB SER QL: NEGATIVE

## 2019-03-15 NOTE — TELEPHONE ENCOUNTER
Informed pt of message below.  Pt voices understanding.     ----- Message from RASHEL Vazquez sent at 3/15/2019 11:05 AM CDT -----  STI SCREEN NEG      Pt voices she did not receive medication for her partner, Kash Devi, for trichomonas expos

## 2019-03-15 NOTE — TELEPHONE ENCOUNTER
Pt called and informed of message below. Pt voices understanding-      ----- Message from Terrace Dancer, APRN sent at 3/15/2019 11:04 AM CDT -----  IUD in place.  Small cyst on ovary can be normal related to iud or ovulation

## 2019-04-06 ENCOUNTER — HOSPITAL ENCOUNTER (OUTPATIENT)
Dept: MAMMOGRAPHY | Facility: HOSPITAL | Age: 39
Discharge: HOME | End: 2019-04-06
Attending: ADVANCED PRACTICE MIDWIFE
Payer: COMMERCIAL

## 2019-04-06 DIAGNOSIS — Z12.39 ENCOUNTER FOR SCREENING BREAST EXAMINATION: ICD-10-CM

## 2019-04-06 PROCEDURE — 77067 SCR MAMMO BI INCL CAD: CPT | Performed by: ADVANCED PRACTICE MIDWIFE

## 2019-04-06 PROCEDURE — 77063 BREAST TOMOSYNTHESIS BI: CPT | Performed by: ADVANCED PRACTICE MIDWIFE

## 2019-04-09 ENCOUNTER — TELEPHONE (OUTPATIENT)
Dept: OBGYN CLINIC | Facility: CLINIC | Age: 39
End: 2019-04-09

## 2019-04-09 NOTE — TELEPHONE ENCOUNTER
LMTCB. [place] placed order for additional views.     ----- Message from RASHEL Garcia sent at 4/8/2019  3:48 PM CDT -----  Repeat views needed please send any necessary orders

## 2019-04-27 ENCOUNTER — OFFICE VISIT (OUTPATIENT)
Dept: INTERNAL MEDICINE CLINIC | Facility: CLINIC | Age: 39
End: 2019-04-27
Payer: COMMERCIAL

## 2019-04-27 VITALS
BODY MASS INDEX: 31.97 KG/M2 | SYSTOLIC BLOOD PRESSURE: 120 MMHG | OXYGEN SATURATION: 100 % | DIASTOLIC BLOOD PRESSURE: 80 MMHG | HEIGHT: 62.5 IN | TEMPERATURE: 99 F | WEIGHT: 178.19 LBS | HEART RATE: 89 BPM | RESPIRATION RATE: 17 BRPM

## 2019-04-27 DIAGNOSIS — R80.9 UNCONTROLLED DIABETES MELLITUS WITH MICROALBUMINURIA (HCC): ICD-10-CM

## 2019-04-27 DIAGNOSIS — E11.65 UNCONTROLLED DIABETES MELLITUS WITH MICROALBUMINURIA (HCC): ICD-10-CM

## 2019-04-27 DIAGNOSIS — N83.209 CYST OF OVARY, UNSPECIFIED LATERALITY: ICD-10-CM

## 2019-04-27 DIAGNOSIS — Z00.00 ANNUAL PHYSICAL EXAM: Primary | ICD-10-CM

## 2019-04-27 DIAGNOSIS — E11.9 ENCOUNTER FOR DIABETIC FOOT EXAM (HCC): ICD-10-CM

## 2019-04-27 DIAGNOSIS — R11.2 NAUSEA AND VOMITING, INTRACTABILITY OF VOMITING NOT SPECIFIED, UNSPECIFIED VOMITING TYPE: ICD-10-CM

## 2019-04-27 DIAGNOSIS — E11.29 UNCONTROLLED DIABETES MELLITUS WITH MICROALBUMINURIA (HCC): ICD-10-CM

## 2019-04-27 DIAGNOSIS — L84 FOOT CALLUS: ICD-10-CM

## 2019-04-27 DIAGNOSIS — R10.13 EPIGASTRIC PAIN: ICD-10-CM

## 2019-04-27 DIAGNOSIS — L81.9 CHANGE IN COLOR OF PIGMENTED SKIN LESION: ICD-10-CM

## 2019-04-27 PROBLEM — IMO0002 UNCONTROLLED DIABETES MELLITUS WITH MICROALBUMINURIA: Status: ACTIVE | Noted: 2019-04-27

## 2019-04-27 PROCEDURE — 82043 UR ALBUMIN QUANTITATIVE: CPT | Performed by: INTERNAL MEDICINE

## 2019-04-27 PROCEDURE — 99396 PREV VISIT EST AGE 40-64: CPT | Performed by: INTERNAL MEDICINE

## 2019-04-27 PROCEDURE — 80050 GENERAL HEALTH PANEL: CPT | Performed by: INTERNAL MEDICINE

## 2019-04-27 PROCEDURE — 83036 HEMOGLOBIN GLYCOSYLATED A1C: CPT | Performed by: INTERNAL MEDICINE

## 2019-04-27 PROCEDURE — 82570 ASSAY OF URINE CREATININE: CPT | Performed by: INTERNAL MEDICINE

## 2019-04-27 PROCEDURE — 80061 LIPID PANEL: CPT | Performed by: INTERNAL MEDICINE

## 2019-04-27 PROCEDURE — 90471 IMMUNIZATION ADMIN: CPT | Performed by: INTERNAL MEDICINE

## 2019-04-27 PROCEDURE — 90732 PPSV23 VACC 2 YRS+ SUBQ/IM: CPT | Performed by: INTERNAL MEDICINE

## 2019-04-27 NOTE — PROGRESS NOTES
Simi Dumont is a 34year old female.     Chief complaint: annual physical exam     HPI:     Here for physical  hasnt been taking any of her dm or other meds   No vaginal discharge or bleeding   No chest pain no sob no abdominal pain  No diarrhea or co Types: 6 Cans of beer per week      Comment: 6 beers a week/socially    Drug use: No       Family History   Problem Relation Age of Onset   • Hypertension Mother    • Diabetes Father    • Musculo-skelatal Disorder Father         ALS     Patient Active Prob of mammogram   Due for pap in july  - CBC WITH DIFFERENTIAL WITH PLATELET; Future  - COMP METABOLIC PANEL (14); Future  - LIPID PANEL; Future  - TSH W REFLEX TO FREE T4; Future  - HEMOGLOBIN A1C; Future  - MICROALB/CREAT RATIO, RANDOM URINE;  Future  - US P

## 2019-05-01 RX ORDER — LISINOPRIL 2.5 MG/1
2.5 TABLET ORAL DAILY
Qty: 90 TABLET | Refills: 1 | Status: SHIPPED | OUTPATIENT
Start: 2019-05-01 | End: 2019-07-30

## 2019-05-01 RX ORDER — ATORVASTATIN CALCIUM 20 MG/1
20 TABLET, FILM COATED ORAL NIGHTLY
Qty: 90 TABLET | Refills: 1 | Status: SHIPPED | OUTPATIENT
Start: 2019-05-01 | End: 2019-07-30

## 2019-05-03 ENCOUNTER — HOSPITAL ENCOUNTER (OUTPATIENT)
Dept: MAMMOGRAPHY | Facility: HOSPITAL | Age: 39
Discharge: HOME | End: 2019-05-03
Attending: ADVANCED PRACTICE MIDWIFE
Payer: COMMERCIAL

## 2019-05-03 ENCOUNTER — HOSPITAL ENCOUNTER (OUTPATIENT)
Dept: ULTRASOUND IMAGING | Facility: HOSPITAL | Age: 39
Discharge: HOME | End: 2019-05-03
Attending: ADVANCED PRACTICE MIDWIFE
Payer: COMMERCIAL

## 2019-05-03 DIAGNOSIS — R92.8 ABNORMAL MAMMOGRAM: ICD-10-CM

## 2019-05-03 PROCEDURE — 77061 BREAST TOMOSYNTHESIS UNI: CPT | Performed by: ADVANCED PRACTICE MIDWIFE

## 2019-05-03 PROCEDURE — 76642 ULTRASOUND BREAST LIMITED: CPT | Performed by: ADVANCED PRACTICE MIDWIFE

## 2019-05-03 PROCEDURE — 77065 DX MAMMO INCL CAD UNI: CPT | Performed by: ADVANCED PRACTICE MIDWIFE

## 2019-06-23 ENCOUNTER — OFFICE VISIT (OUTPATIENT)
Dept: FAMILY MEDICINE CLINIC | Facility: CLINIC | Age: 39
End: 2019-06-23
Payer: COMMERCIAL

## 2019-06-23 VITALS
RESPIRATION RATE: 16 BRPM | BODY MASS INDEX: 31.91 KG/M2 | WEIGHT: 177.88 LBS | OXYGEN SATURATION: 99 % | HEART RATE: 84 BPM | DIASTOLIC BLOOD PRESSURE: 82 MMHG | SYSTOLIC BLOOD PRESSURE: 138 MMHG | TEMPERATURE: 98 F | HEIGHT: 62.5 IN

## 2019-06-23 DIAGNOSIS — H57.89 IRRITATION OF BOTH EYES: Primary | ICD-10-CM

## 2019-06-23 DIAGNOSIS — Z91.14 NONCOMPLIANCE WITH MEDICATIONS: ICD-10-CM

## 2019-06-23 PROCEDURE — 99213 OFFICE O/P EST LOW 20 MIN: CPT | Performed by: NURSE PRACTITIONER

## 2019-06-23 NOTE — PATIENT INSTRUCTIONS
Throw away contacts, get new solution and storage containers. If worsening pain, eye tearing, redness, or sensitivity to light, you must be seen immediately by eye doctor in in clinic.

## 2019-06-23 NOTE — PROGRESS NOTES
CHIEF COMPLAINT:   Patient presents with:  Eye Problem: right eye started this morning left eye has had sym for a week. tearing up in the light, some redness, some burning,       HPI:   Guillermo Gilmore is a 34year old female who presents with chief compl Diagnosis Date   • Diabetes [facility])    • Essential hypertension    • Hyperlipidemia       Past Surgical History:   Procedure Laterality Date   •      • CHOLECYSTECTOMY     • EGD      [facility] [place]   • ESOPHAGOGASTRODUODENOSCOPY (EGD) N/A 20 Both Eyes Chart Acuity: 20/20     Fluorescein Eye Stain Procedure   Parent gave verbal consent on patient's behalf. Risks and Benefits of removal were discussed with the parent, who agreed to proceed with procedure.    Site: right Eye   Indication: + eye re return if not improved in 2-3 days. The patient is asked to follow up with their PCP prn.

## 2019-06-24 ENCOUNTER — TELEPHONE (OUTPATIENT)
Dept: FAMILY MEDICINE CLINIC | Facility: CLINIC | Age: 39
End: 2019-06-24

## 2019-06-24 RX ORDER — OFLOXACIN 3 MG/ML
1 SOLUTION/ DROPS OPHTHALMIC 4 TIMES DAILY
Qty: 5 ML | Refills: 0 | Status: SHIPPED | OUTPATIENT
Start: 2019-06-24 | End: 2019-07-01

## 2019-06-24 NOTE — TELEPHONE ENCOUNTER
Patient evaluated 6/23/19 for eye irritation, VA normal/fluorosceine eye stain w/o uptake, overall clinical exam WNL. Patient calls today w/ c/o increased eye redness, sensitivity and tearing ? Discharge.  She states the clinician at visit stated he would s

## 2019-10-02 ENCOUNTER — APPOINTMENT (OUTPATIENT)
Dept: GENERAL RADIOLOGY | Age: 39
End: 2019-10-02
Attending: EMERGENCY MEDICINE
Payer: COMMERCIAL

## 2019-10-02 ENCOUNTER — HOSPITAL ENCOUNTER (OUTPATIENT)
Age: 39
Discharge: HOME | End: 2019-10-02
Attending: EMERGENCY MEDICINE
Payer: COMMERCIAL

## 2019-10-02 VITALS
RESPIRATION RATE: 18 BRPM | WEIGHT: 175 LBS | SYSTOLIC BLOOD PRESSURE: 158 MMHG | HEIGHT: 63 IN | BODY MASS INDEX: 31.01 KG/M2 | DIASTOLIC BLOOD PRESSURE: 96 MMHG | OXYGEN SATURATION: 100 % | TEMPERATURE: 98 F | HEART RATE: 91 BPM

## 2019-10-02 DIAGNOSIS — S60.011A CONTUSION OF RIGHT THUMB WITHOUT DAMAGE TO NAIL, INITIAL ENCOUNTER: Primary | ICD-10-CM

## 2019-10-02 PROCEDURE — 99213 OFFICE O/P EST LOW 20 MIN: CPT

## 2019-10-02 PROCEDURE — 73140 X-RAY EXAM OF FINGER(S): CPT | Performed by: EMERGENCY MEDICINE

## 2019-10-02 RX ORDER — NAPROXEN 500 MG/1
500 TABLET ORAL 2 TIMES DAILY WITH MEALS
Qty: 20 TABLET | Refills: 0 | Status: SHIPPED | OUTPATIENT
Start: 2019-10-02 | End: 2019-10-09

## 2019-10-02 NOTE — ED PROVIDER NOTES
Patient Seen in: [facility] In [address] [place] [address]    History   Patient presents with:  Upper Extremity Injury (musculoskeletal)    Stated Complaint: rt thumb pain    HPI    HPI: Carlton Dunn is a 37year old female who presents after an Cans of beer per week      Comment: 6 beers a week/socially    Drug use: No      Review of Systems    Positive for stated complaint: rt thumb pain  Other systems are as noted in HPI. Constitutional and vital signs reviewed.       All other systems reviewed Prescribed:  Current Discharge Medication List    START taking these medications    naproxen 500 MG Oral Tab  Take 1 tablet (500 mg total) by mouth 2 (two) times daily with meals for 7 days.   Qty: 20 tablet Refills: 0                    Disposition and January

## 2019-11-15 ENCOUNTER — PATIENT OUTREACH (OUTPATIENT)
Dept: INTERNAL MEDICINE CLINIC | Facility: CLINIC | Age: 39
End: 2019-11-15

## 2020-01-20 ENCOUNTER — HOSPITAL ENCOUNTER (OUTPATIENT)
Dept: ULTRASOUND IMAGING | Age: 40
Discharge: HOME | End: 2020-01-20
Attending: INTERNAL MEDICINE
Payer: COMMERCIAL

## 2020-01-20 DIAGNOSIS — E11.65 UNCONTROLLED DIABETES MELLITUS WITH MICROALBUMINURIA (HCC): ICD-10-CM

## 2020-01-20 DIAGNOSIS — E11.9 ENCOUNTER FOR DIABETIC FOOT EXAM (HCC): ICD-10-CM

## 2020-01-20 DIAGNOSIS — L81.9 CHANGE IN COLOR OF PIGMENTED SKIN LESION: ICD-10-CM

## 2020-01-20 DIAGNOSIS — N83.209 CYST OF OVARY, UNSPECIFIED LATERALITY: ICD-10-CM

## 2020-01-20 DIAGNOSIS — E11.29 UNCONTROLLED DIABETES MELLITUS WITH MICROALBUMINURIA (HCC): ICD-10-CM

## 2020-01-20 DIAGNOSIS — R80.9 UNCONTROLLED DIABETES MELLITUS WITH MICROALBUMINURIA (HCC): ICD-10-CM

## 2020-01-20 DIAGNOSIS — L84 FOOT CALLUS: ICD-10-CM

## 2020-01-20 DIAGNOSIS — Z00.00 ANNUAL PHYSICAL EXAM: ICD-10-CM

## 2020-01-20 PROCEDURE — 76856 US EXAM PELVIC COMPLETE: CPT | Performed by: INTERNAL MEDICINE

## 2020-01-20 PROCEDURE — 76830 TRANSVAGINAL US NON-OB: CPT | Performed by: INTERNAL MEDICINE

## 2020-02-18 ENCOUNTER — OFFICE VISIT (OUTPATIENT)
Dept: OBGYN CLINIC | Facility: CLINIC | Age: 40
End: 2020-02-18
Payer: COMMERCIAL

## 2020-02-18 VITALS — DIASTOLIC BLOOD PRESSURE: 80 MMHG | BODY MASS INDEX: 32 KG/M2 | SYSTOLIC BLOOD PRESSURE: 120 MMHG | WEIGHT: 178.63 LBS

## 2020-02-18 DIAGNOSIS — Z12.39 BREAST SCREENING: ICD-10-CM

## 2020-02-18 DIAGNOSIS — N89.8 VAGINAL DISCHARGE: Primary | ICD-10-CM

## 2020-02-18 DIAGNOSIS — Z01.419 ENCOUNTER FOR ANNUAL ROUTINE GYNECOLOGICAL EXAMINATION: ICD-10-CM

## 2020-02-18 PROCEDURE — 99396 PREV VISIT EST AGE 40-64: CPT | Performed by: ADVANCED PRACTICE MIDWIFE

## 2020-02-18 RX ORDER — METRONIDAZOLE 500 MG/1
500 TABLET ORAL 2 TIMES DAILY
Qty: 14 TABLET | Refills: 0 | Status: SHIPPED | OUTPATIENT
Start: 2020-02-18 | End: 2020-02-25

## 2020-02-18 RX ORDER — FLUCONAZOLE 150 MG/1
150 TABLET ORAL ONCE
Qty: 2 TABLET | Refills: 0 | Status: SHIPPED | OUTPATIENT
Start: 2020-02-18 | End: 2020-02-18

## 2020-02-18 NOTE — PROGRESS NOTES
HPI:    Patient ID: Astrid Diego is a 37year old female. Having cramps that are increasing in strength along with spotting. This occurs in between the menses. Has regular monthly menses. Has not had a pap smear in  Years.   Mirena was placed 12/ 500 MG Oral Tab Take 1 tablet (500 mg total) by mouth 2 (two) times daily for 7 days. 14 tablet 0   • fluconazole (DIFLUCAN) 150 MG Oral Tab Take 1 tablet (150 mg total) by mouth once for 1 dose.  2 tablet 0   • ALPRAZolam 0.25 MG Oral Tab Take 1 tablet (0. week      Comment: 6 beers a week/socially    Drug use: No      Exercise: walking.   Diet: doesn't watch and is diabetic            Current Outpatient Medications   Medication Sig Dispense Refill   • metRONIDAZOLE (FLAGYL) 500 MG Oral Tab Take 1 tablet (500 heard.  Pulmonary/Chest: Effort normal and breath sounds normal. No respiratory distress. She exhibits no tenderness. Right breast exhibits no inverted nipple, no mass, no nipple discharge, no skin change and no tenderness.  Left breast exhibits no inverted of IUD  7. Encouraged to see Dr. Ja Thompson and to address diabetes  8. Metronidazole 500 mg bid x 7 days  9.   Fluconazole 150 mg po #2,     Orders Placed This Encounter      Hpv Dna  High Risk , Thin Prep Collect      ThinPrep PAP Smear      Vaginal Cultu

## 2020-02-19 ENCOUNTER — TELEPHONE (OUTPATIENT)
Dept: OBGYN CLINIC | Facility: CLINIC | Age: 40
End: 2020-02-19

## 2020-02-19 DIAGNOSIS — Z11.3 ROUTINE SCREENING FOR STI (SEXUALLY TRANSMITTED INFECTION): Primary | ICD-10-CM

## 2020-02-19 LAB
C TRACH DNA SPEC QL NAA+PROBE: NEGATIVE
HPV I/H RISK 1 DNA SPEC QL NAA+PROBE: POSITIVE
N GONORRHOEA DNA SPEC QL NAA+PROBE: NEGATIVE

## 2020-02-19 NOTE — TELEPHONE ENCOUNTER
Neovasc message sent to pt.      ----- Message from RASHEL Vazquez sent at 2/19/2020  2:19 PM CST -----  Negative Chlamydia/GC please notify patient

## 2020-02-19 NOTE — TELEPHONE ENCOUNTER
Pt called and informed of results and provider's recommendations below. Pt voices understanding.      ----- Message from RASHEL Hood sent at 2/19/2020  8:59 AM CST -----  This is a sexually transmitted infection.  Yesterday I gave you a prescription

## 2020-02-20 LAB
GENITAL VAGINOSIS SCREEN: NEGATIVE
HPV16 DNA CVX QL PROBE+SIG AMP: NEGATIVE
HPV18 DNA CVX QL PROBE+SIG AMP: NEGATIVE
TRICHOMONAS SCREEN: POSITIVE

## 2020-02-24 ENCOUNTER — TELEPHONE (OUTPATIENT)
Dept: OBGYN CLINIC | Facility: CLINIC | Age: 40
End: 2020-02-24

## 2020-02-25 NOTE — TELEPHONE ENCOUNTER
----- Message from RASHEL Rubio sent at 2/24/2020 11:17 AM CST -----  ASCUS with Positive HPV. Please advise the patient she needs a colposcopy with me.   I have not released these results

## 2020-02-25 NOTE — TELEPHONE ENCOUNTER
Pt called and informed of results and provider's recommendations below. Pt voices understanding. Pt scheduled for colposcopy on 3/3/20 at 5:30 pm with nupur.

## 2020-03-03 ENCOUNTER — TELEPHONE (OUTPATIENT)
Dept: OBGYN CLINIC | Facility: CLINIC | Age: 40
End: 2020-03-03

## 2020-03-03 ENCOUNTER — NURSE ONLY (OUTPATIENT)
Dept: OBGYN CLINIC | Facility: CLINIC | Age: 40
End: 2020-03-03
Payer: COMMERCIAL

## 2020-03-03 VITALS
HEART RATE: 90 BPM | WEIGHT: 175.19 LBS | SYSTOLIC BLOOD PRESSURE: 138 MMHG | BODY MASS INDEX: 31 KG/M2 | DIASTOLIC BLOOD PRESSURE: 90 MMHG

## 2020-03-03 DIAGNOSIS — Z01.812 PRE-PROCEDURAL LABORATORY EXAMINATION: ICD-10-CM

## 2020-03-03 DIAGNOSIS — R87.610 ASCUS WITH POSITIVE HIGH RISK HPV CERVICAL: Primary | ICD-10-CM

## 2020-03-03 DIAGNOSIS — R87.611 PAPANICOLAOU SMEAR OF CERVIX WITH ATYPICAL SQUAMOUS CELLS CANNOT EXCLUDE HIGH GRADE SQUAMOUS INTRAEPITHELIAL LESION (ASC-H): ICD-10-CM

## 2020-03-03 DIAGNOSIS — R87.810 CERVICAL HIGH RISK HUMAN PAPILLOMAVIRUS (HPV) DNA TEST POSITIVE: ICD-10-CM

## 2020-03-03 DIAGNOSIS — R87.810 ASCUS WITH POSITIVE HIGH RISK HPV CERVICAL: Primary | ICD-10-CM

## 2020-03-03 LAB
CONTROL LINE PRESENT WITH A CLEAR BACKGROUND (YES/NO): YES YES/NO
KIT LOT #: NORMAL NUMERIC
PREGNANCY TEST, URINE: NEGATIVE

## 2020-03-03 PROCEDURE — 57454 BX/CURETT OF CERVIX W/SCOPE: CPT | Performed by: ADVANCED PRACTICE MIDWIFE

## 2020-03-03 PROCEDURE — 81025 URINE PREGNANCY TEST: CPT | Performed by: ADVANCED PRACTICE MIDWIFE

## 2020-03-04 NOTE — PROCEDURES
Colpo w/Cx Biopsy and ECC    Pregnancy Results: negative from urine test   Birth control method(s) used:  ; date last used:      Consent signed.   Procedure discussed with patient in detail including indication, risk, benefits, alternatives and complication

## 2020-03-05 ENCOUNTER — TELEPHONE (OUTPATIENT)
Dept: OBGYN CLINIC | Facility: CLINIC | Age: 40
End: 2020-03-05

## 2020-03-05 RX ORDER — DOXYCYCLINE HYCLATE 100 MG/1
100 CAPSULE ORAL 2 TIMES DAILY
Qty: 20 CAPSULE | Refills: 0 | Status: SHIPPED | OUTPATIENT
Start: 2020-03-05 | End: 2021-05-10 | Stop reason: ALTCHOICE

## 2020-03-05 NOTE — TELEPHONE ENCOUNTER
----- Message from RASHEL Chu sent at 3/5/2020  8:25 AM CST -----  Let patient know there is no evidence of dysplasia only cervicitis, cervical inflammation. Please send doxycycline 100 mg BID x 10 days and repeat pap in 1 year.

## 2020-03-05 NOTE — TELEPHONE ENCOUNTER
03/05/2020 Verified 2 patient identifier. Pt was informed about her Bx results. Pt verbalized understanding, pt did not have any further questions.     Pt would like Rx to be sent to    [facility] [address], [address] [place] [address]

## 2020-03-19 ENCOUNTER — TELEPHONE (OUTPATIENT)
Dept: INTERNAL MEDICINE CLINIC | Facility: CLINIC | Age: 40
End: 2020-03-19

## 2020-03-19 NOTE — TELEPHONE ENCOUNTER
3/16/20: Monday evening she started with Diarrhea (today had two episodes). PEPTO made her stomach hurt. Overnight started the vomiting X2.  3/17/20: Tuesday Morning started headache and body aches. Was taking advil, stopped.     3/18/20: Last night so

## 2020-03-19 NOTE — TELEPHONE ENCOUNTER
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

## 2020-05-19 ENCOUNTER — TELEMEDICINE (OUTPATIENT)
Dept: INTERNAL MEDICINE CLINIC | Facility: CLINIC | Age: 40
End: 2020-05-19
Payer: COMMERCIAL

## 2020-05-19 ENCOUNTER — LAB ENCOUNTER (OUTPATIENT)
Dept: LAB | Facility: HOSPITAL | Age: 40
End: 2020-05-19
Attending: INTERNAL MEDICINE
Payer: COMMERCIAL

## 2020-05-19 DIAGNOSIS — H92.01 RIGHT EAR PAIN: ICD-10-CM

## 2020-05-19 DIAGNOSIS — J02.9 SORE THROAT: ICD-10-CM

## 2020-05-19 DIAGNOSIS — J02.9 SORE THROAT: Primary | ICD-10-CM

## 2020-05-19 PROCEDURE — 99213 OFFICE O/P EST LOW 20 MIN: CPT | Performed by: INTERNAL MEDICINE

## 2020-05-19 RX ORDER — AMOXICILLIN AND CLAVULANATE POTASSIUM 875; 125 MG/1; MG/1
1 TABLET, FILM COATED ORAL 2 TIMES DAILY
Qty: 20 TABLET | Refills: 0 | Status: SHIPPED | OUTPATIENT
Start: 2020-05-19 | End: 2020-05-29

## 2020-05-20 ENCOUNTER — TELEPHONE (OUTPATIENT)
Dept: INTERNAL MEDICINE CLINIC | Facility: CLINIC | Age: 40
End: 2020-05-20

## 2020-05-20 NOTE — TELEPHONE ENCOUNTER
Patient just received the call that her Covid 19 test she had done, is negative. She's asking if Dr. Eric Martinez could please send a return to work letter thru My Chart for her.     The patient mentioned that Dr. Eric Martinez told her she could send her the not

## 2020-06-22 DIAGNOSIS — Z30.9 ENCOUNTER FOR CONTRACEPTIVE MANAGEMENT, UNSPECIFIED TYPE: Primary | ICD-10-CM

## 2020-06-25 ENCOUNTER — OFFICE VISIT (OUTPATIENT)
Dept: OBGYN CLINIC | Facility: CLINIC | Age: 40
End: 2020-06-25
Payer: COMMERCIAL

## 2020-06-25 VITALS
SYSTOLIC BLOOD PRESSURE: 160 MMHG | BODY MASS INDEX: 31.54 KG/M2 | WEIGHT: 178 LBS | DIASTOLIC BLOOD PRESSURE: 88 MMHG | HEIGHT: 63 IN

## 2020-06-25 DIAGNOSIS — T83.32XA INTRAUTERINE CONTRACEPTIVE DEVICE THREADS LOST, INITIAL ENCOUNTER: Primary | ICD-10-CM

## 2020-06-25 DIAGNOSIS — Z30.8 ENCOUNTER FOR OTHER CONTRACEPTIVE MANAGEMENT: ICD-10-CM

## 2020-06-25 PROCEDURE — 99213 OFFICE O/P EST LOW 20 MIN: CPT | Performed by: OBSTETRICS & GYNECOLOGY

## 2020-06-25 NOTE — PROGRESS NOTES
HPI:    Patient ID: Tej Meza is a 37year old female. Patient here to discuss contraception. Has Mirena IUD. Previous note states that patient had Mirena IUD placed at outside clinic in 12/2016.   Patient now is not sure when it was placed but total) by mouth daily. (Patient not taking: Reported on 2/18/2020 ) 30 tablet 0   • Ondansetron HCl (ZOFRAN) 4 mg tablet Take 1 tablet (4 mg total) by mouth every 8 (eight) hours as needed for Nausea.  (Patient not taking: Reported on 2/18/2020 ) 30 tablet reviewed. To call office if desires Hysteroscopic IUD removal with replacement with Donna Most IUD. No orders of the defined types were placed in this encounter.       Meds This Visit:  Requested Prescriptions      No prescriptions requested or ordered in th

## 2020-07-11 ENCOUNTER — HOSPITAL ENCOUNTER (OUTPATIENT)
Dept: MAMMOGRAPHY | Facility: HOSPITAL | Age: 40
Discharge: HOME | End: 2020-07-11
Attending: INTERNAL MEDICINE
Payer: COMMERCIAL

## 2020-07-11 DIAGNOSIS — Z12.39 BREAST SCREENING: ICD-10-CM

## 2020-07-11 PROCEDURE — 77063 BREAST TOMOSYNTHESIS BI: CPT | Performed by: ADVANCED PRACTICE MIDWIFE

## 2020-07-11 PROCEDURE — 77067 SCR MAMMO BI INCL CAD: CPT | Performed by: ADVANCED PRACTICE MIDWIFE

## 2020-07-21 ENCOUNTER — HOSPITAL ENCOUNTER (OUTPATIENT)
Age: 40
Discharge: HOME | End: 2020-07-21
Attending: EMERGENCY MEDICINE
Payer: COMMERCIAL

## 2020-07-21 VITALS
SYSTOLIC BLOOD PRESSURE: 147 MMHG | TEMPERATURE: 98 F | DIASTOLIC BLOOD PRESSURE: 87 MMHG | OXYGEN SATURATION: 100 % | HEART RATE: 96 BPM | RESPIRATION RATE: 20 BRPM

## 2020-07-21 DIAGNOSIS — B34.9 VIRAL SYNDROME: Primary | ICD-10-CM

## 2020-07-21 DIAGNOSIS — E11.00 UNCONTROLLED TYPE 2 DIABETES MELLITUS WITH HYPEROSMOLAR NONKETOTIC HYPERGLYCEMIA (HCC): ICD-10-CM

## 2020-07-21 LAB
#MXD IC: 0.5 X10ˆ3/UL (ref 0.1–1)
CREAT BLD-MCNC: 0.6 MG/DL (ref 0.55–1.02)
GLUCOSE BLD-MCNC: 308 MG/DL (ref 70–99)
GLUCOSE BLDC GLUCOMTR-MCNC: 306 MG/DL (ref 70–99)
HCT VFR BLD AUTO: 35.7 % (ref 35–48)
HGB BLD-MCNC: 13 G/DL (ref 12–16)
ISTAT BUN: 16 MG/DL (ref 8–20)
ISTAT CHLORIDE: 102 MMOL/L (ref 101–111)
ISTAT HEMATOCRIT: 40 % (ref 34–50)
ISTAT IONIZED CALCIUM FOR CHEM 8: 1.2 MMOL/L (ref 1.12–1.32)
ISTAT POTASSIUM: 4.1 MMOL/L (ref 3.6–5.1)
ISTAT SODIUM: 135 MMOL/L (ref 136–145)
LYMPHOCYTES # BLD AUTO: 1.4 X10ˆ3/UL (ref 1–4)
LYMPHOCYTES NFR BLD AUTO: 20.3 %
MCH RBC QN AUTO: 32.3 PG (ref 26–34)
MCHC RBC AUTO-ENTMCNC: 36.4 G/DL (ref 31–37)
MCV RBC AUTO: 88.6 FL (ref 80–100)
MIXED CELL %: 7.6 %
NEUTROPHILS # BLD AUTO: 5 X10ˆ3/UL (ref 1.5–7.7)
NEUTROPHILS NFR BLD AUTO: 72.1 %
PLATELET # BLD AUTO: 257 X10ˆ3/UL (ref 150–450)
RBC # BLD AUTO: 4.03 X10ˆ6/UL (ref 3.8–5.3)
S PYO AG THROAT QL: NEGATIVE
WBC # BLD AUTO: 6.9 X10ˆ3/UL (ref 4–11)

## 2020-07-21 PROCEDURE — 85025 COMPLETE CBC W/AUTO DIFF WBC: CPT | Performed by: EMERGENCY MEDICINE

## 2020-07-21 PROCEDURE — 99213 OFFICE O/P EST LOW 20 MIN: CPT

## 2020-07-21 PROCEDURE — 99214 OFFICE O/P EST MOD 30 MIN: CPT

## 2020-07-21 PROCEDURE — 87430 STREP A AG IA: CPT

## 2020-07-21 PROCEDURE — 82962 GLUCOSE BLOOD TEST: CPT

## 2020-07-21 PROCEDURE — 80047 BASIC METABLC PNL IONIZED CA: CPT

## 2020-07-21 PROCEDURE — 36415 COLL VENOUS BLD VENIPUNCTURE: CPT

## 2020-07-21 NOTE — ED PROVIDER NOTES
Patient Seen in: [facility]      History   Patient presents with:  Sore Throat    Stated Complaint: Sore throat ha body aches     HPI    The patient is a 49-year-old female with a past history of diabetes (currently on no Triage Vitals [07/21/20 0912]   /87   Pulse 96   Resp 20   Temp 97.5 °F (36.4 °C)   Temp src Temporal   SpO2 100 %   O2 Device None (Room air)       Current:/87   Pulse 96   Temp 97.5 °F (36.4 °C) (Temporal)   Resp 20   LMP 06/01/2020   SpO2 10 7/21/2020 10:02 AM    START taking these medications    SITagliptin-metFORMIN HCl ER (JANUMET XR)  MG Oral Tablet 24 Hr  Take 1 tablet by mouth 2 (two) times a day., Normal, Disp-60 tablet, R-0

## 2020-07-22 LAB — SARS-COV-2 RNA RESP QL NAA+PROBE: NOT DETECTED

## 2020-07-25 ENCOUNTER — OFFICE VISIT (OUTPATIENT)
Dept: INTERNAL MEDICINE CLINIC | Facility: CLINIC | Age: 40
End: 2020-07-25
Payer: COMMERCIAL

## 2020-07-25 ENCOUNTER — TELEPHONE (OUTPATIENT)
Dept: INTERNAL MEDICINE CLINIC | Facility: CLINIC | Age: 40
End: 2020-07-25

## 2020-07-25 DIAGNOSIS — J02.9 SORE THROAT: Primary | ICD-10-CM

## 2020-07-25 RX ORDER — AZITHROMYCIN 250 MG/1
TABLET, FILM COATED ORAL
Qty: 6 TABLET | Refills: 0 | Status: CANCELLED | OUTPATIENT
Start: 2020-07-25 | End: 2020-07-30

## 2020-07-25 RX ORDER — AZITHROMYCIN 250 MG/1
TABLET, FILM COATED ORAL
Qty: 6 TABLET | Refills: 0 | Status: SHIPPED | OUTPATIENT
Start: 2020-07-25 | End: 2020-07-30

## 2020-07-25 NOTE — TELEPHONE ENCOUNTER
Patient reschedule appointment for 09/26/2020 for her physical, she mention at this point she doesn't care about diabetes check up she only wants physical done

## 2020-07-25 NOTE — PROGRESS NOTES
Patient with cough , headache and  Sore throat   Advised to switch to virtual visit   Patient declined

## 2020-12-02 ENCOUNTER — HOSPITAL ENCOUNTER (OUTPATIENT)
Age: 40
Discharge: LEFT BEFORE DISCHARGE | End: 2020-12-02
Payer: COMMERCIAL

## 2020-12-02 VITALS
TEMPERATURE: 101 F | DIASTOLIC BLOOD PRESSURE: 89 MMHG | BODY MASS INDEX: 31.18 KG/M2 | WEIGHT: 176 LBS | HEIGHT: 63 IN | SYSTOLIC BLOOD PRESSURE: 184 MMHG | HEART RATE: 98 BPM | RESPIRATION RATE: 16 BRPM | OXYGEN SATURATION: 99 %

## 2020-12-02 DIAGNOSIS — Z53.29 LEFT AGAINST MEDICAL ADVICE: ICD-10-CM

## 2020-12-02 DIAGNOSIS — I10 HYPERTENSION, UNSPECIFIED TYPE: ICD-10-CM

## 2020-12-02 DIAGNOSIS — Z20.822 ENCOUNTER FOR LABORATORY TESTING FOR COVID-19 VIRUS: Primary | ICD-10-CM

## 2020-12-02 PROCEDURE — 99214 OFFICE O/P EST MOD 30 MIN: CPT | Performed by: NURSE PRACTITIONER

## 2020-12-02 PROCEDURE — 82962 GLUCOSE BLOOD TEST: CPT | Performed by: NURSE PRACTITIONER

## 2020-12-03 NOTE — ED NOTES
Pt discharged in stable condition  Instructed to go to ER  for hyperglycemia and hypertension.   Refused to go to ER  Sharyn BOLDEN explained  Risks and benefits of going to ER  Pt claims that she is only here for COVID test

## 2020-12-03 NOTE — ED PROVIDER NOTES
Patient Seen in: [facility]      History   Patient presents with:  Testing    Stated Complaint: covid testing-exposed    HPI    64-year-old with uncontrolled diabetes here today for Covid testing.   Patient was exposed to her mother on Thanksg 31.18 kg/m²         Physical Exam    Adult physical exam:     VS: Vital signs reviewed. O2 saturation within normal limits for this patient     General: Patient is awake and alert, oriented to person, place and time. Not in acute distress.       HEENT: Head had patient sign AMA.              Disposition and Plan     Clinical Impression:  Encounter for laboratory testing for COVID-19 virus  (primary encounter diagnosis)  Left against medical advice  Hypertension, unspecified type    Disposition:  [facility]  12/2/2020

## 2020-12-07 DIAGNOSIS — N39.0 URINARY TRACT INFECTION WITHOUT HEMATURIA, SITE UNSPECIFIED: ICD-10-CM

## 2020-12-07 RX ORDER — ALPRAZOLAM 0.25 MG/1
0.25 TABLET ORAL EVERY 6 HOURS PRN
Qty: 30 TABLET | Refills: 0 | Status: SHIPPED | OUTPATIENT
Start: 2020-12-07

## 2020-12-07 NOTE — TELEPHONE ENCOUNTER
Pt called to follow up on this request. States she has been very stressed lately. I'm sure she would schedule a virtual visit if needs be, just let her know what she needs to do.

## 2020-12-09 ENCOUNTER — TELEMEDICINE (OUTPATIENT)
Dept: INTERNAL MEDICINE CLINIC | Facility: CLINIC | Age: 40
End: 2020-12-09

## 2020-12-09 DIAGNOSIS — U07.1 COVID-19 VIRUS INFECTION: ICD-10-CM

## 2020-12-09 DIAGNOSIS — R05.9 COUGH: Primary | ICD-10-CM

## 2020-12-09 PROCEDURE — 99213 OFFICE O/P EST LOW 20 MIN: CPT | Performed by: INTERNAL MEDICINE

## 2020-12-09 RX ORDER — ALBUTEROL SULFATE 90 UG/1
2 AEROSOL, METERED RESPIRATORY (INHALATION) EVERY 6 HOURS PRN
Qty: 1 INHALER | Refills: 2 | Status: SHIPPED | OUTPATIENT
Start: 2020-12-09 | End: 2021-12-03 | Stop reason: ALTCHOICE

## 2020-12-09 RX ORDER — AZITHROMYCIN 250 MG/1
TABLET, FILM COATED ORAL
Qty: 6 TABLET | Refills: 0 | Status: SHIPPED | OUTPATIENT
Start: 2020-12-09 | End: 2020-12-14

## 2020-12-09 NOTE — PROGRESS NOTES
This visit was conducted using telemedicine with live interactive video and audio   Patient verbally consents to conduct video visit   Patient understands and accepts financial responsibility for any deductible, co-insurance and/or co-pays associated with ) 20 capsule 0   • lisinopril 2.5 MG Oral Tab Take 1 tablet (2.5 mg total) by mouth daily.  (Patient not taking: Reported on 12/2/2020 ) 30 tablet 0   • Ondansetron HCl (ZOFRAN) 4 mg tablet Take 1 tablet (4 mg total) by mouth every 8 (eight) hours as needed swelling noted       ASSESSMENT AND PLAN:  Joslyn Tomas is a 41year old female.   (R05) Cough  (primary encounter diagnosis)  Plan: azithromycin (ZITHROMAX Z-MICHELLE) 250 MG Oral Tab,        Albuterol Sulfate  (90 Base) MCG/ACT         Inhalation A physical exam could be performed. The patient was advised to call 911 or to go to the ER in case there was an emergency. Included in this visit, time may have been spent reviewing labs, medications, radiology tests and decision making.  Appropriate me

## 2020-12-10 ENCOUNTER — PATIENT MESSAGE (OUTPATIENT)
Dept: INTERNAL MEDICINE CLINIC | Facility: CLINIC | Age: 40
End: 2020-12-10

## 2020-12-14 NOTE — TELEPHONE ENCOUNTER
Nabeel Hutchinson 2020 10:39 AM CST      ----- Message -----  From: Joseph Headin2020 10:33 AM CST  To: Gilles Santos Clinical Staff  Subject: RE: follow up visit     I'm sorry I think I misread your email  I currently have fatigue, cough, sli (it is currently set for Monday). I spoke to hr at my job and they advised if I return and then need time off after that it would be a more difficult time to have it covered under covid so I would like to try to be a little bit better before my return.  If

## 2020-12-18 DIAGNOSIS — R80.9 TYPE 2 DIABETES MELLITUS WITH MICROALBUMINURIA, WITHOUT LONG-TERM CURRENT USE OF INSULIN (HCC): ICD-10-CM

## 2020-12-18 DIAGNOSIS — I10 ESSENTIAL HYPERTENSION: Primary | ICD-10-CM

## 2020-12-18 DIAGNOSIS — E11.29 TYPE 2 DIABETES MELLITUS WITH MICROALBUMINURIA, WITHOUT LONG-TERM CURRENT USE OF INSULIN (HCC): ICD-10-CM

## 2020-12-18 RX ORDER — LOSARTAN POTASSIUM 50 MG/1
50 TABLET ORAL DAILY
Qty: 90 TABLET | Refills: 0 | Status: SHIPPED | OUTPATIENT
Start: 2020-12-18 | End: 2021-03-18

## 2021-03-18 DIAGNOSIS — Z23 NEED FOR VACCINATION: ICD-10-CM

## 2021-03-25 ENCOUNTER — HOSPITAL ENCOUNTER (OUTPATIENT)
Age: 41
Discharge: HOME | End: 2021-03-25
Attending: EMERGENCY MEDICINE
Payer: COMMERCIAL

## 2021-03-25 VITALS
TEMPERATURE: 98 F | DIASTOLIC BLOOD PRESSURE: 81 MMHG | SYSTOLIC BLOOD PRESSURE: 195 MMHG | OXYGEN SATURATION: 99 % | RESPIRATION RATE: 18 BRPM | HEART RATE: 96 BPM

## 2021-03-25 DIAGNOSIS — R73.9 HYPERGLYCEMIA: Primary | ICD-10-CM

## 2021-03-25 DIAGNOSIS — N89.8 VAGINAL DISCHARGE: ICD-10-CM

## 2021-03-25 LAB
B-HCG UR QL: NEGATIVE
BILIRUB UR QL STRIP: NEGATIVE
CLARITY UR: CLEAR
COLOR UR: YELLOW
GLUCOSE BLDC GLUCOMTR-MCNC: 395 MG/DL (ref 70–99)
GLUCOSE UR STRIP-MCNC: 500 MG/DL
KETONES UR STRIP-MCNC: NEGATIVE MG/DL
LEUKOCYTE ESTERASE UR QL STRIP: NEGATIVE
NITRITE UR QL STRIP: NEGATIVE
PH UR STRIP: 5.5 [PH]
PROT UR STRIP-MCNC: >=300 MG/DL
SP GR UR STRIP: 1.01
UROBILINOGEN UR STRIP-ACNC: <2 MG/DL

## 2021-03-25 PROCEDURE — 87808 TRICHOMONAS ASSAY W/OPTIC: CPT | Performed by: EMERGENCY MEDICINE

## 2021-03-25 PROCEDURE — 87591 N.GONORRHOEAE DNA AMP PROB: CPT | Performed by: EMERGENCY MEDICINE

## 2021-03-25 PROCEDURE — 82962 GLUCOSE BLOOD TEST: CPT

## 2021-03-25 PROCEDURE — 99214 OFFICE O/P EST MOD 30 MIN: CPT

## 2021-03-25 PROCEDURE — 87491 CHLMYD TRACH DNA AMP PROBE: CPT | Performed by: EMERGENCY MEDICINE

## 2021-03-25 PROCEDURE — 87106 FUNGI IDENTIFICATION YEAST: CPT | Performed by: EMERGENCY MEDICINE

## 2021-03-25 PROCEDURE — 87205 SMEAR GRAM STAIN: CPT | Performed by: EMERGENCY MEDICINE

## 2021-03-25 PROCEDURE — 81025 URINE PREGNANCY TEST: CPT

## 2021-03-25 PROCEDURE — 81002 URINALYSIS NONAUTO W/O SCOPE: CPT

## 2021-03-25 RX ORDER — 0.9 % SODIUM CHLORIDE 0.9 %
1000 INTRAVENOUS SOLUTION INTRAVENOUS ONCE
Status: DISCONTINUED | OUTPATIENT
Start: 2021-03-25 | End: 2021-03-25

## 2021-03-25 NOTE — ED NOTES
Patient declines ivf due to time constraints, md notified. instructed by md to cancel labs and have patient to have her outpatient labs drawn. Patient verbalized understanding.

## 2021-03-26 LAB
C TRACH DNA SPEC QL NAA+PROBE: NEGATIVE
N GONORRHOEA DNA SPEC QL NAA+PROBE: NEGATIVE

## 2021-03-27 LAB
GENITAL VAGINOSIS SCREEN: POSITIVE
TRICHOMONAS SCREEN: POSITIVE

## 2021-03-28 NOTE — ED PROVIDER NOTES
Patient Seen in: [facility]      History   Patient presents with:  Eval-G: Entered by patient    Stated Complaint: Std Screen    HPI/Subjective:   HPI    42 yo female with vaginal irritation and discharge. Concerned she may have STD.  She is s Pupils: Pupils are equal, round, and reactive to light. Cardiovascular:      Rate and Rhythm: Normal rate and regular rhythm. Pulmonary:      Effort: Pulmonary effort is normal. No respiratory distress.    Genitourinary:     Vagina: Vaginal discharge (m

## 2021-05-10 ENCOUNTER — TELEPHONE (OUTPATIENT)
Dept: INTERNAL MEDICINE CLINIC | Facility: CLINIC | Age: 41
End: 2021-05-10

## 2021-05-10 DIAGNOSIS — Z20.822 SUSPECTED COVID-19 VIRUS INFECTION: Primary | ICD-10-CM

## 2021-05-10 NOTE — TELEPHONE ENCOUNTER
Patient needs an order for covid test, she mention she needs a special test PCR, she started with symptoms yesterday, headaches, sore throat, congestion, and stuffy nose. Please call back.

## 2021-05-10 NOTE — TELEPHONE ENCOUNTER
Started:  5/10/21. Headache, body aches, cough. Employer wants her to have the PCR test done no earlier than Wed.,5/12/21. Order submitted; she will go to [facility].

## 2021-05-12 ENCOUNTER — LAB ENCOUNTER (OUTPATIENT)
Dept: LAB | Age: 41
End: 2021-05-12
Attending: INTERNAL MEDICINE
Payer: COMMERCIAL

## 2021-05-12 DIAGNOSIS — Z20.822 SUSPECTED COVID-19 VIRUS INFECTION: ICD-10-CM

## 2021-07-30 ENCOUNTER — PATIENT MESSAGE (OUTPATIENT)
Dept: INTERNAL MEDICINE CLINIC | Facility: CLINIC | Age: 41
End: 2021-07-30

## 2021-07-30 DIAGNOSIS — N64.4 BREAST PAIN: Primary | ICD-10-CM

## 2021-08-02 NOTE — TELEPHONE ENCOUNTER
Vanna Stephens RN 7/30/2021 2:35 PM CDT      ----- Message -----  From: Kendy Pavon  Sent: 7/30/2021 11:22 AM CDT  To: Gilles Santos Clinical Staff  Subject: Referral Request     Good morning Dr Vince Rizzo,    I Scheduled a mammogram for myself at [facility]

## 2021-08-04 ENCOUNTER — HOSPITAL ENCOUNTER (OUTPATIENT)
Dept: MAMMOGRAPHY | Facility: HOSPITAL | Age: 41
End: 2021-08-04
Attending: INTERNAL MEDICINE
Payer: COMMERCIAL

## 2021-08-04 ENCOUNTER — HOSPITAL ENCOUNTER (OUTPATIENT)
Dept: MAMMOGRAPHY | Facility: HOSPITAL | Age: 41
Discharge: HOME | End: 2021-08-04
Attending: INTERNAL MEDICINE
Payer: COMMERCIAL

## 2021-08-04 DIAGNOSIS — N64.4 BREAST PAIN: ICD-10-CM

## 2021-08-04 PROCEDURE — 76642 ULTRASOUND BREAST LIMITED: CPT | Performed by: INTERNAL MEDICINE

## 2021-08-04 PROCEDURE — 77062 BREAST TOMOSYNTHESIS BI: CPT | Performed by: INTERNAL MEDICINE

## 2021-08-04 PROCEDURE — 77066 DX MAMMO INCL CAD BI: CPT | Performed by: INTERNAL MEDICINE

## 2021-08-06 DIAGNOSIS — R92.2 INCONCLUSIVE MAMMOGRAM: ICD-10-CM

## 2021-08-06 DIAGNOSIS — R92.2 DENSE BREAST: Primary | ICD-10-CM

## 2021-08-21 ENCOUNTER — HOSPITAL ENCOUNTER (INPATIENT)
Facility: HOSPITAL | Age: 41
LOS: 5 days | Discharge: HOME | DRG: 637 | End: 2021-08-26
Attending: EMERGENCY MEDICINE | Admitting: HOSPITALIST
Payer: COMMERCIAL

## 2021-08-21 ENCOUNTER — APPOINTMENT (OUTPATIENT)
Dept: CT IMAGING | Facility: HOSPITAL | Age: 41
DRG: 637 | End: 2021-08-21
Attending: HOSPITALIST
Payer: COMMERCIAL

## 2021-08-21 DIAGNOSIS — K92.2 ACUTE UPPER GI BLEEDING: ICD-10-CM

## 2021-08-21 DIAGNOSIS — R11.2 INTRACTABLE NAUSEA AND VOMITING: Primary | ICD-10-CM

## 2021-08-21 LAB
ALBUMIN SERPL-MCNC: 3.1 G/DL (ref 3.4–5)
ALP LIVER SERPL-CCNC: 91 U/L
ALT SERPL-CCNC: 21 U/L
AMPHET UR QL SCN: NEGATIVE
ANION GAP SERPL CALC-SCNC: 12 MMOL/L (ref 0–18)
APTT PPP: 29.5 SECONDS (ref 23.2–35.3)
AST SERPL-CCNC: 14 U/L (ref 15–37)
B-HCG UR QL: NEGATIVE
BARBITURATES UR QL SCN: NEGATIVE
BASOPHILS # BLD AUTO: 0.03 X10(3) UL (ref 0–0.2)
BASOPHILS NFR BLD AUTO: 0.4 %
BENZODIAZ UR QL SCN: NEGATIVE
BILIRUB DIRECT SERPL-MCNC: <0.1 MG/DL (ref 0–0.2)
BILIRUB SERPL-MCNC: 0.6 MG/DL (ref 0.1–2)
BILIRUB UR QL: NEGATIVE
BUN BLD-MCNC: 13 MG/DL (ref 7–18)
BUN/CREAT SERPL: 18.1 (ref 10–20)
CALCIUM BLD-MCNC: 9.1 MG/DL (ref 8.5–10.1)
CANNABINOIDS UR QL SCN: NEGATIVE
CHLORIDE SERPL-SCNC: 98 MMOL/L (ref 98–112)
CLARITY UR: CLEAR
CO2 SERPL-SCNC: 24 MMOL/L (ref 21–32)
COCAINE UR QL: NEGATIVE
COLOR UR: YELLOW
CREAT BLD-MCNC: 0.72 MG/DL
CREAT UR-SCNC: 16.7 MG/DL
D DIMER PPP FEU-MCNC: 0.98 UG/ML FEU (ref ?–0.5)
DEPRECATED RDW RBC AUTO: 39 FL (ref 35.1–46.3)
EOSINOPHIL # BLD AUTO: 0.06 X10(3) UL (ref 0–0.7)
EOSINOPHIL NFR BLD AUTO: 0.7 %
ERYTHROCYTE [DISTWIDTH] IN BLOOD BY AUTOMATED COUNT: 11.9 % (ref 11–15)
EST. AVERAGE GLUCOSE BLD GHB EST-MCNC: 344 MG/DL (ref 68–126)
ETHANOL SERPL-MCNC: <3 MG/DL (ref ?–3)
GLUCOSE BLD-MCNC: 393 MG/DL (ref 70–99)
GLUCOSE BLDC GLUCOMTR-MCNC: 305 MG/DL (ref 70–99)
GLUCOSE BLDC GLUCOMTR-MCNC: 310 MG/DL (ref 70–99)
GLUCOSE BLDC GLUCOMTR-MCNC: 334 MG/DL (ref 70–99)
GLUCOSE BLDC GLUCOMTR-MCNC: 340 MG/DL (ref 70–99)
GLUCOSE BLDC GLUCOMTR-MCNC: 396 MG/DL (ref 70–99)
GLUCOSE UR-MCNC: >=500 MG/DL
HAV IGM SER QL: 1.9 MG/DL (ref 1.6–2.6)
HBA1C MFR BLD HPLC: 13.6 % (ref ?–5.7)
HCT VFR BLD AUTO: 40.3 %
HGB BLD-MCNC: 14.1 G/DL
IMM GRANULOCYTES # BLD AUTO: 0.03 X10(3) UL (ref 0–1)
IMM GRANULOCYTES NFR BLD: 0.4 %
INR BLD: 0.95 (ref 0.9–1.2)
KETONES UR-MCNC: 80 MG/DL
LEUKOCYTE ESTERASE UR QL STRIP.AUTO: NEGATIVE
LIPASE SERPL-CCNC: 202 U/L (ref 73–393)
LYMPHOCYTES # BLD AUTO: 1.91 X10(3) UL (ref 1–4)
LYMPHOCYTES NFR BLD AUTO: 23.2 %
M PROTEIN MFR SERPL ELPH: 8.1 G/DL (ref 6.4–8.2)
MCH RBC QN AUTO: 31.4 PG (ref 26–34)
MCHC RBC AUTO-ENTMCNC: 35 G/DL (ref 31–37)
MCV RBC AUTO: 89.8 FL
MDMA UR QL SCN: NEGATIVE
METHADONE UR QL SCN: NEGATIVE
MONOCYTES # BLD AUTO: 0.29 X10(3) UL (ref 0.1–1)
MONOCYTES NFR BLD AUTO: 3.5 %
NEUTROPHILS # BLD AUTO: 5.92 X10 (3) UL (ref 1.5–7.7)
NEUTROPHILS # BLD AUTO: 5.92 X10(3) UL (ref 1.5–7.7)
NEUTROPHILS NFR BLD AUTO: 71.8 %
NITRITE UR QL STRIP.AUTO: NEGATIVE
OPIATES UR QL SCN: NEGATIVE
OSMOLALITY SERPL CALC.SUM OF ELEC: 294 MOSM/KG (ref 275–295)
OXYCODONE UR QL SCN: NEGATIVE
PCP UR QL SCN: NEGATIVE
PH UR: 6 [PH] (ref 5–8)
PLATELET # BLD AUTO: 349 10(3)UL (ref 150–450)
POTASSIUM SERPL-SCNC: 3.2 MMOL/L (ref 3.5–5.1)
PROT UR-MCNC: >=500 MG/DL
PROTHROMBIN TIME: 12.5 SECONDS (ref 11.8–14.5)
RBC # BLD AUTO: 4.49 X10(6)UL
SARS-COV-2 RNA RESP QL NAA+PROBE: NOT DETECTED
SODIUM SERPL-SCNC: 134 MMOL/L (ref 136–145)
SP GR UR STRIP: 1.03 (ref 1–1.03)
UROBILINOGEN UR STRIP-ACNC: <2
WBC # BLD AUTO: 8.2 X10(3) UL (ref 4–11)

## 2021-08-21 PROCEDURE — 99223 1ST HOSP IP/OBS HIGH 75: CPT | Performed by: HOSPITALIST

## 2021-08-21 PROCEDURE — 74177 CT ABD & PELVIS W/CONTRAST: CPT | Performed by: HOSPITALIST

## 2021-08-21 PROCEDURE — 71260 CT THORAX DX C+: CPT | Performed by: HOSPITALIST

## 2021-08-21 PROCEDURE — 3046F HEMOGLOBIN A1C LEVEL >9.0%: CPT | Performed by: INTERNAL MEDICINE

## 2021-08-21 RX ORDER — ONDANSETRON 2 MG/ML
4 INJECTION INTRAMUSCULAR; INTRAVENOUS ONCE
Status: COMPLETED | OUTPATIENT
Start: 2021-08-21 | End: 2021-08-21

## 2021-08-21 RX ORDER — METOCLOPRAMIDE HYDROCHLORIDE 5 MG/ML
10 INJECTION INTRAMUSCULAR; INTRAVENOUS EVERY 6 HOURS PRN
Status: DISCONTINUED | OUTPATIENT
Start: 2021-08-21 | End: 2021-08-22

## 2021-08-21 RX ORDER — ONDANSETRON 2 MG/ML
4 INJECTION INTRAMUSCULAR; INTRAVENOUS EVERY 6 HOURS PRN
Status: DISCONTINUED | OUTPATIENT
Start: 2021-08-21 | End: 2021-08-26

## 2021-08-21 RX ORDER — MORPHINE SULFATE 2 MG/ML
2 INJECTION, SOLUTION INTRAMUSCULAR; INTRAVENOUS EVERY 6 HOURS PRN
Status: DISCONTINUED | OUTPATIENT
Start: 2021-08-21 | End: 2021-08-26

## 2021-08-21 RX ORDER — ONDANSETRON 2 MG/ML
INJECTION INTRAMUSCULAR; INTRAVENOUS
Status: COMPLETED
Start: 2021-08-21 | End: 2021-08-21

## 2021-08-21 RX ORDER — METOCLOPRAMIDE HYDROCHLORIDE 5 MG/ML
10 INJECTION INTRAMUSCULAR; INTRAVENOUS ONCE
Status: COMPLETED | OUTPATIENT
Start: 2021-08-21 | End: 2021-08-21

## 2021-08-21 RX ORDER — HEPARIN SODIUM 5000 [USP'U]/ML
5000 INJECTION, SOLUTION INTRAVENOUS; SUBCUTANEOUS EVERY 8 HOURS SCHEDULED
Status: DISCONTINUED | OUTPATIENT
Start: 2021-08-21 | End: 2021-08-26

## 2021-08-21 RX ORDER — MORPHINE SULFATE 2 MG/ML
1 INJECTION, SOLUTION INTRAMUSCULAR; INTRAVENOUS EVERY 6 HOURS PRN
Status: DISCONTINUED | OUTPATIENT
Start: 2021-08-21 | End: 2021-08-21

## 2021-08-21 RX ORDER — HYDRALAZINE HYDROCHLORIDE 20 MG/ML
10 INJECTION INTRAMUSCULAR; INTRAVENOUS EVERY 6 HOURS PRN
Status: DISCONTINUED | OUTPATIENT
Start: 2021-08-21 | End: 2021-08-26

## 2021-08-21 RX ORDER — SODIUM CHLORIDE 9 MG/ML
INJECTION, SOLUTION INTRAVENOUS CONTINUOUS
Status: DISCONTINUED | OUTPATIENT
Start: 2021-08-21 | End: 2021-08-23

## 2021-08-21 RX ORDER — DEXTROSE MONOHYDRATE 25 G/50ML
50 INJECTION, SOLUTION INTRAVENOUS
Status: DISCONTINUED | OUTPATIENT
Start: 2021-08-21 | End: 2021-08-23

## 2021-08-21 RX ORDER — HALOPERIDOL 5 MG/ML
5 INJECTION INTRAMUSCULAR ONCE
Status: COMPLETED | OUTPATIENT
Start: 2021-08-21 | End: 2021-08-21

## 2021-08-21 NOTE — ED INITIAL ASSESSMENT (HPI)
Pt c/o epigastric pain + vomiting since Wednesday. Unable to tolerate PO intake. Pt also feeling lightheaded.

## 2021-08-21 NOTE — ED PROVIDER NOTES
Patient Seen in: [facility] AND [facility] Emergency Department      History   Patient presents with:  Abdomen/Flank Pain  Vomiting    Stated Complaint:     HPI/Subjective:   HPI    41year old female with a past medical history of diabetes, hypertension, hyperl (Exact Date)   SpO2 100%   BMI 30.82 kg/m²         Physical Exam  Vitals and nursing note reviewed. Constitutional:       General: She is not in acute distress. Appearance: She is well-developed. She is ill-appearing.  She is not toxic-appearing or di WITH CULTURE REFLEX - Abnormal; Notable for the following components:    Glucose Urine >=500 (*)     Ketones Urine 80  (*)     Blood Urine Small (*)     Protein Urine >=500 (*)     RBC Urine 6-10 (*)     Squamous Epi.  Cells Few (*)     All other components interventions, collecting and interpreting tests and discussion with consultants but not including time spent performing procedures.     Medications   0.9% NaCl infusion ( Intravenous New Bag 8/21/21 5710)   Heparin Sodium (Porcine) 5000 UNIT/ML injection 5 given Reglan, further IV fluids, protonix, and Haldol with some relief.   I discussed with GI, Dr. Renee Dyer who advised watching patient and if improved with normal labs especially hgb and bun, could be small tear from emesis, but admit if pt uncomfortable/vom

## 2021-08-21 NOTE — H&P
[facility]    PATIENT'S NAME: Jorge VALIENTE   ATTENDING PHYSICIAN: Iwona Suresh MD   PATIENT ACCOUNT#:   [de-identified]    LOCATION:  [place] [address]  MEDICAL RECORD #:   [id number]       YOB: 1980  ADMISSION DATE:       08/21/ beer per week. Patient does use cannabis. REVIEW OF SYSTEMS:  A ten-point review of systems has been obtained and is otherwise negative.          PHYSICAL EXAMINATION:    GENERAL:  Patient is lying in bed, appears to be in moderate distress at this j carlos her UA. Will monitor closely. 3.   Hypertension. Will continue to monitor and add IV antihypertensives as needed. 4. VTE prophylaxis will be heparin subcutaneous 5000 units t.i.d.    5.   Disposition. Will monitor closely. Patient is a Full Code.

## 2021-08-21 NOTE — ED QUICK NOTES
Pt states she tried drinking gatorade, did not tolerate and vomited, ERMD aware. Plan for admission. Orders for admission, patient is aware of plan and ready to go upstairs. Any questions, please call ED RN [address] Galindo  at extension [phone].     Type of COVID

## 2021-08-21 NOTE — PLAN OF CARE
Pt arrived to [facility] around 1430, pt c/o pf abdominal pain and nausea, elevated Hrs and BP-MD contacted : see MAR. A/Ox4. Fall risk precautions appropriate for pt: bed in lowest position, call light within reach, non-skid socks. IVF at 100cc/hr.   Alarm par Progressing     Problem: GASTROINTESTINAL - ADULT  Goal: Minimal or absence of nausea and vomiting  Description: INTERVENTIONS:  - Maintain adequate hydration with IV or PO as ordered and tolerated  - Nasogastric tube to low intermittent suction as ordered patient/family  Outcome: Progressing

## 2021-08-22 ENCOUNTER — APPOINTMENT (OUTPATIENT)
Dept: CV DIAGNOSTICS | Facility: HOSPITAL | Age: 41
DRG: 637 | End: 2021-08-22
Attending: HOSPITALIST
Payer: COMMERCIAL

## 2021-08-22 LAB
ANION GAP SERPL CALC-SCNC: 11 MMOL/L (ref 0–18)
ANION GAP SERPL CALC-SCNC: 24 MMOL/L (ref 0–18)
BASOPHILS # BLD AUTO: 0.03 X10(3) UL (ref 0–0.2)
BASOPHILS NFR BLD AUTO: 0.2 %
BUN BLD-MCNC: 20 MG/DL (ref 7–18)
BUN BLD-MCNC: 22 MG/DL (ref 7–18)
BUN/CREAT SERPL: 20 (ref 10–20)
BUN/CREAT SERPL: 20.8 (ref 10–20)
C DIFF TOX B STL QL: NEGATIVE
CALCIUM BLD-MCNC: 8.1 MG/DL (ref 8.5–10.1)
CALCIUM BLD-MCNC: 8.3 MG/DL (ref 8.5–10.1)
CHLORIDE SERPL-SCNC: 108 MMOL/L (ref 98–112)
CHLORIDE SERPL-SCNC: 112 MMOL/L (ref 98–112)
CO2 SERPL-SCNC: 19 MMOL/L (ref 21–32)
CO2 SERPL-SCNC: 8 MMOL/L (ref 21–32)
CREAT BLD-MCNC: 0.96 MG/DL
CREAT BLD-MCNC: 1.1 MG/DL
DEPRECATED RDW RBC AUTO: 44.1 FL (ref 35.1–46.3)
EOSINOPHIL # BLD AUTO: 0 X10(3) UL (ref 0–0.7)
EOSINOPHIL NFR BLD AUTO: 0 %
ERYTHROCYTE [DISTWIDTH] IN BLOOD BY AUTOMATED COUNT: 12.8 % (ref 11–15)
GLUCOSE BLD-MCNC: 246 MG/DL (ref 70–99)
GLUCOSE BLD-MCNC: 400 MG/DL (ref 70–99)
GLUCOSE BLDC GLUCOMTR-MCNC: 146 MG/DL (ref 70–99)
GLUCOSE BLDC GLUCOMTR-MCNC: 150 MG/DL (ref 70–99)
GLUCOSE BLDC GLUCOMTR-MCNC: 151 MG/DL (ref 70–99)
GLUCOSE BLDC GLUCOMTR-MCNC: 162 MG/DL (ref 70–99)
GLUCOSE BLDC GLUCOMTR-MCNC: 207 MG/DL (ref 70–99)
GLUCOSE BLDC GLUCOMTR-MCNC: 239 MG/DL (ref 70–99)
GLUCOSE BLDC GLUCOMTR-MCNC: 268 MG/DL (ref 70–99)
GLUCOSE BLDC GLUCOMTR-MCNC: 332 MG/DL (ref 70–99)
GLUCOSE BLDC GLUCOMTR-MCNC: 334 MG/DL (ref 70–99)
GLUCOSE BLDC GLUCOMTR-MCNC: 372 MG/DL (ref 70–99)
GLUCOSE BLDC GLUCOMTR-MCNC: 380 MG/DL (ref 70–99)
GLUCOSE BLDC GLUCOMTR-MCNC: 413 MG/DL (ref 70–99)
HAV IGM SER QL: 2.2 MG/DL (ref 1.6–2.6)
HCT VFR BLD AUTO: 38.4 %
HGB BLD-MCNC: 12.8 G/DL
IMM GRANULOCYTES # BLD AUTO: 0.12 X10(3) UL (ref 0–1)
IMM GRANULOCYTES NFR BLD: 0.8 %
LYMPHOCYTES # BLD AUTO: 1.18 X10(3) UL (ref 1–4)
LYMPHOCYTES NFR BLD AUTO: 7.4 %
MCH RBC QN AUTO: 31.4 PG (ref 26–34)
MCHC RBC AUTO-ENTMCNC: 33.3 G/DL (ref 31–37)
MCV RBC AUTO: 94.1 FL
MONOCYTES # BLD AUTO: 0.47 X10(3) UL (ref 0.1–1)
MONOCYTES NFR BLD AUTO: 2.9 %
NEUTROPHILS # BLD AUTO: 14.14 X10 (3) UL (ref 1.5–7.7)
NEUTROPHILS # BLD AUTO: 14.14 X10(3) UL (ref 1.5–7.7)
NEUTROPHILS NFR BLD AUTO: 88.7 %
OSMOLALITY SERPL CALC.SUM OF ELEC: 306 MOSM/KG (ref 275–295)
OSMOLALITY SERPL CALC.SUM OF ELEC: 309 MOSM/KG (ref 275–295)
PHOSPHATE SERPL-MCNC: 3.6 MG/DL (ref 2.5–4.9)
PLATELET # BLD AUTO: 383 10(3)UL (ref 150–450)
POTASSIUM SERPL-SCNC: 3.5 MMOL/L (ref 3.5–5.1)
POTASSIUM SERPL-SCNC: 3.7 MMOL/L (ref 3.5–5.1)
RBC # BLD AUTO: 4.08 X10(6)UL
SODIUM SERPL-SCNC: 140 MMOL/L (ref 136–145)
SODIUM SERPL-SCNC: 142 MMOL/L (ref 136–145)
TROPONIN I SERPL-MCNC: <0.045 NG/ML (ref ?–0.04)
TROPONIN I SERPL-MCNC: <0.045 NG/ML (ref ?–0.04)
WBC # BLD AUTO: 15.9 X10(3) UL (ref 4–11)

## 2021-08-22 PROCEDURE — 93306 TTE W/DOPPLER COMPLETE: CPT | Performed by: HOSPITALIST

## 2021-08-22 PROCEDURE — 99254 IP/OBS CNSLTJ NEW/EST MOD 60: CPT | Performed by: INTERNAL MEDICINE

## 2021-08-22 PROCEDURE — 99233 SBSQ HOSP IP/OBS HIGH 50: CPT | Performed by: INTERNAL MEDICINE

## 2021-08-22 PROCEDURE — 99233 SBSQ HOSP IP/OBS HIGH 50: CPT | Performed by: HOSPITALIST

## 2021-08-22 RX ORDER — METOPROLOL TARTRATE 5 MG/5ML
5 INJECTION INTRAVENOUS AS NEEDED
Status: DISCONTINUED | OUTPATIENT
Start: 2021-08-22 | End: 2021-08-26

## 2021-08-22 RX ORDER — ALPRAZOLAM 0.25 MG/1
0.25 TABLET ORAL EVERY 6 HOURS PRN
Status: DISCONTINUED | OUTPATIENT
Start: 2021-08-22 | End: 2021-08-25

## 2021-08-22 RX ORDER — DEXTROSE MONOHYDRATE 25 G/50ML
50 INJECTION, SOLUTION INTRAVENOUS
Status: DISCONTINUED | OUTPATIENT
Start: 2021-08-22 | End: 2021-08-22

## 2021-08-22 RX ORDER — METOPROLOL TARTRATE 5 MG/5ML
2.5 INJECTION INTRAVENOUS
Status: DISCONTINUED | OUTPATIENT
Start: 2021-08-22 | End: 2021-08-24

## 2021-08-22 RX ORDER — METOCLOPRAMIDE HYDROCHLORIDE 5 MG/ML
10 INJECTION INTRAMUSCULAR; INTRAVENOUS EVERY 8 HOURS
Status: DISCONTINUED | OUTPATIENT
Start: 2021-08-22 | End: 2021-08-26

## 2021-08-22 RX ORDER — METOPROLOL TARTRATE 5 MG/5ML
5 INJECTION INTRAVENOUS
Status: DISCONTINUED | OUTPATIENT
Start: 2021-08-22 | End: 2021-08-25

## 2021-08-22 RX ORDER — POTASSIUM CHLORIDE 14.9 MG/ML
20 INJECTION INTRAVENOUS ONCE
Status: COMPLETED | OUTPATIENT
Start: 2021-08-22 | End: 2021-08-22

## 2021-08-22 RX ORDER — METOPROLOL TARTRATE 50 MG/1
50 TABLET, FILM COATED ORAL
Status: DISCONTINUED | OUTPATIENT
Start: 2021-08-22 | End: 2021-08-25

## 2021-08-22 RX ORDER — METOPROLOL TARTRATE 5 MG/5ML
2.5 INJECTION INTRAVENOUS AS NEEDED
Status: DISCONTINUED | OUTPATIENT
Start: 2021-08-22 | End: 2021-08-26

## 2021-08-22 NOTE — CONSULTS
Lawrenceardibessy Redd Patient Status:  Inpatient    1980 MRN [id number]   Location [facility] 2W/SW Attending Danilo Trejo MD   Hosp Day # 1 PCP Chichi Moura MD     Reason for Consultation:  Sinus tachycardia and abnormal EKG    Hist smokeless tobacco. She reports current alcohol use of about 6.0 standard drinks of alcohol per week. She reports current drug use. Drug: Cannabis.     Allergies:  No Known Allergies    Medications:    Current Facility-Administered Medications:   •  ALPRAZol oriented in no apparent distress. HEENT: No focal deficits. Neck: No JVD, carotids 2+ no bruits. Cardiac: Tachycardic  Lungs: Clear without wheezes, rales, rhonchi or dullness. Normal excursions and effort.   Abdomen: Soft, +tenderness to epigastric are - Suspected 2/2 gastritis or Rosangela-Mosley tear  - No overt bleeding  - Endoscopy held off this admission  - GI following     #Pancreatic lesion  - GI following     #HTN  - Elevated  - Likely 2/2 pain but also has mild LVH on echo so likely underlying unc

## 2021-08-22 NOTE — PLAN OF CARE
Patient was transferred to [facility] to be placed on insulin drip after blood glucose 413 this AM. Patient is A&Ox4, on room air. Patient continues to c/o nausea, vomited x2, both times small amount of clear bile. Made NPO except water and ice chips.  Patient no diabetes  8/22/2021 1737 by Enoc Romano, RN  Outcome: Progressing  8/22/2021 1737 by Enoc Romano, RN  Outcome: Progressing     Problem: Patient/Family Goals  Goal: Patient/Family Long Term Goal  Description: Patient's Long Term Goal: to go back home Progressing  Goal: Maintains adequate nutritional intake (undernourished)  Description: INTERVENTIONS:  - Monitor percentage of each meal consumed  - Identify factors contributing to decreased intake, treat as appropriate  - Assist with meals as needed  - Continuous cardiac monitoring, monitor vital signs, obtain 12 lead EKG if indicated  - Evaluate effectiveness of antiarrhythmic and heart rate control medications as ordered  - Initiate emergency measures for life threatening arrhythmias  - Monitor electro

## 2021-08-22 NOTE — CONSULTS
[facility]   Gastroenterology Consultation Note    Olya Jaeger  Patient Status:    Inpatient  Date of Admission:         8/21/2021, Hospital day #1  Attending:   Iliana Morales MD  PCP:     Fredrick Silverio MD    Chief Complain intraductal papillary mucinous neoplasm, IPMN, or, potentially, a pseudocyst in the setting of remote pancreatitis. Follow-up MRCP of   the abdomen with and without contrast is recommended for further assessment.       5.  Intrauterine contraceptive device 30 g, 30 g, Oral, Q15 Min PRN **OR** glucose-vitamin C (DEX-4) chewable tab 8 tablet, 8 tablet, Oral, Q15 Min PRN  •  hydrALAzine HCl (APRESOLINE) injection 10 mg, 10 mg, Intravenous, Q6H PRN  •  morphINE sulfate (PF) 2 MG/ML injection 2 mg, 2 mg, Intraven Value Date    WBC 15.9 08/22/2021    HGB 12.8 08/22/2021    HCT 38.4 08/22/2021    .0 08/22/2021    CREATSERUM 0.96 08/22/2021    BUN 20 08/22/2021     08/22/2021    K 3.7 08/22/2021     08/22/2021    CO2 8.0 08/22/2021     08/22/ findings as above. A preliminary report was issued by the [address] [place] [address] Radiology teleradiology service. There are no major discrepancies. elm-remote.    Dictated by (CST): Antelmo Munoz MD on 8/22/2021 at 6:34 AM     Finalized by (CST): AUSTYN Owens voice recognition dictation software. As a result, errors may occur. When identified, these errors have been corrected.  While every attempt is made to correct errors during dictation, discrepancies may still exist.

## 2021-08-22 NOTE — CONSULTS
[facility]    Report of Consultation    Hiren Anderson Patient Status:  Inpatient    1980 MRN [id number]   Location [facility] 2W/SW Attending Lydia Barriga MD   Hosp Day # 2 PCP Sahara Pinead MD     Date of Admiss Intravenous, Q15 Min PRN **OR** glucose (DEX4) oral liquid 30 g, 30 g, Oral, Q15 Min PRN **OR** glucose-vitamin C (DEX-4) chewable tab 8 tablet, 8 tablet, Oral, Q15 Min PRN  •  Insulin Aspart Pen (NOVOLOG) 100 UNIT/ML flexpen 5 Units, 5 Units, Subcutaneous distress  Head: Atraumatic  Eyes:  normal conjunctivae, sclera. , normal sclera and normal pupils  Throat/Neck: normal sound to voice. Normal hearing, normal speech  Respiratory:  Speaking in full sentences, non-labored.  no increased work of breathing, no a

## 2021-08-22 NOTE — PROGRESS NOTES
[facility]    Progress Note    Blane Cisneros Patient Status:  Inpatient    1980 MRN [id number]   Location [facility] 2W/SW Attending Elizabeth Barragan MD   Hosp Day # 2 PCP Moise Escobar MD     Subjective:  Feels bett Acute upper GI bleeding      DKA  Now resolved  She has been on an insulin drip  Will transition to subcutaneous insulin    PLAN:   Levemir 18  Novolog 5 with CF with meals  Accuchecks ac and hs  Hypoglycemia protocol  Diabetic diet  Diabetes education con

## 2021-08-22 NOTE — PLAN OF CARE
Problem: Patient Centered Care  Goal: Patient preferences are identified and integrated in the patient's plan of care  Description: Interventions:  - What would you like us to know as we care for you?  From home with family  - Provide timely, complete, an pharmacy to review patient's medication profile  Outcome: Progressing  Goal: Maintains adequate nutritional intake (undernourished)  Description: INTERVENTIONS:  - Monitor percentage of each meal consumed  - Identify factors contributing to decreased intak appropriate  - Advance diet as tolerated, if ordered  - Obtain nutritional consult as needed  - Evaluate fluid balance  Outcome: Not Progressing     Patient is a/ox4. Zofran, reglan PRN. Fall risk, all needs attended to, call light within reach.

## 2021-08-22 NOTE — PROGRESS NOTES
[facility]    Progress Note    Tej Meza Patient Status:  Inpatient    1980 MRN [id number]   Location [facility] 5SW/SE Attending Jolyn Libman, MD   Hosp Day # 1 PCP Vanessa Elise MD     HPI/Subjective:   Po Talbert or reflect underlying colitis. 2. No evidence of loculated, drainable fluid collection to suggest abscess formation. 3. Small hiatal hernia with distal esophageal thickening; there may be some element of underlying esophagitis.   4. There is an indetermin appreciate recs. - continue IV analgesics, IV antiemetics, and IV hydration.   - Leukocytosis - likely reactive, will trend.      Uncontrolled DM  - hba1c elevated to 13  - started on low dose sliding scale  - continue accuchecks qakelvins,   - start on Levem

## 2021-08-23 ENCOUNTER — TELEPHONE (OUTPATIENT)
Dept: ENDOCRINOLOGY CLINIC | Facility: CLINIC | Age: 41
End: 2021-08-23

## 2021-08-23 LAB
ANION GAP SERPL CALC-SCNC: 9 MMOL/L (ref 0–18)
BASOPHILS # BLD AUTO: 0.02 X10(3) UL (ref 0–0.2)
BASOPHILS NFR BLD AUTO: 0.1 %
BUN BLD-MCNC: 19 MG/DL (ref 7–18)
BUN/CREAT SERPL: 29.7 (ref 10–20)
CALCIUM BLD-MCNC: 7.8 MG/DL (ref 8.5–10.1)
CHLORIDE SERPL-SCNC: 112 MMOL/L (ref 98–112)
CO2 SERPL-SCNC: 20 MMOL/L (ref 21–32)
CREAT BLD-MCNC: 0.64 MG/DL
DEPRECATED RDW RBC AUTO: 42.2 FL (ref 35.1–46.3)
EOSINOPHIL # BLD AUTO: 0 X10(3) UL (ref 0–0.7)
EOSINOPHIL NFR BLD AUTO: 0 %
ERYTHROCYTE [DISTWIDTH] IN BLOOD BY AUTOMATED COUNT: 12.6 % (ref 11–15)
GLUCOSE BLD-MCNC: 142 MG/DL (ref 70–99)
GLUCOSE BLDC GLUCOMTR-MCNC: 125 MG/DL (ref 70–99)
GLUCOSE BLDC GLUCOMTR-MCNC: 128 MG/DL (ref 70–99)
GLUCOSE BLDC GLUCOMTR-MCNC: 133 MG/DL (ref 70–99)
GLUCOSE BLDC GLUCOMTR-MCNC: 139 MG/DL (ref 70–99)
GLUCOSE BLDC GLUCOMTR-MCNC: 140 MG/DL (ref 70–99)
GLUCOSE BLDC GLUCOMTR-MCNC: 143 MG/DL (ref 70–99)
GLUCOSE BLDC GLUCOMTR-MCNC: 145 MG/DL (ref 70–99)
GLUCOSE BLDC GLUCOMTR-MCNC: 153 MG/DL (ref 70–99)
GLUCOSE BLDC GLUCOMTR-MCNC: 173 MG/DL (ref 70–99)
GLUCOSE BLDC GLUCOMTR-MCNC: 179 MG/DL (ref 70–99)
GLUCOSE BLDC GLUCOMTR-MCNC: 197 MG/DL (ref 70–99)
GLUCOSE BLDC GLUCOMTR-MCNC: 222 MG/DL (ref 70–99)
HAV IGM SER QL: 2 MG/DL (ref 1.6–2.6)
HCT VFR BLD AUTO: 36.5 %
HGB BLD-MCNC: 12.5 G/DL
IMM GRANULOCYTES # BLD AUTO: 0.08 X10(3) UL (ref 0–1)
IMM GRANULOCYTES NFR BLD: 0.6 %
LYMPHOCYTES # BLD AUTO: 1.27 X10(3) UL (ref 1–4)
LYMPHOCYTES NFR BLD AUTO: 9.2 %
MCH RBC QN AUTO: 31.4 PG (ref 26–34)
MCHC RBC AUTO-ENTMCNC: 34.2 G/DL (ref 31–37)
MCV RBC AUTO: 91.7 FL
MONOCYTES # BLD AUTO: 0.65 X10(3) UL (ref 0.1–1)
MONOCYTES NFR BLD AUTO: 4.7 %
NEUTROPHILS # BLD AUTO: 11.77 X10 (3) UL (ref 1.5–7.7)
NEUTROPHILS # BLD AUTO: 11.77 X10(3) UL (ref 1.5–7.7)
NEUTROPHILS NFR BLD AUTO: 85.4 %
OSMOLALITY SERPL CALC.SUM OF ELEC: 297 MOSM/KG (ref 275–295)
PHOSPHATE SERPL-MCNC: 1.2 MG/DL (ref 2.5–4.9)
PLATELET # BLD AUTO: 357 10(3)UL (ref 150–450)
POTASSIUM SERPL-SCNC: 3.1 MMOL/L (ref 3.5–5.1)
RBC # BLD AUTO: 3.98 X10(6)UL
SODIUM SERPL-SCNC: 141 MMOL/L (ref 136–145)
WBC # BLD AUTO: 13.8 X10(3) UL (ref 4–11)

## 2021-08-23 PROCEDURE — 99233 SBSQ HOSP IP/OBS HIGH 50: CPT | Performed by: HOSPITALIST

## 2021-08-23 PROCEDURE — 99232 SBSQ HOSP IP/OBS MODERATE 35: CPT | Performed by: PHYSICIAN ASSISTANT

## 2021-08-23 PROCEDURE — 99232 SBSQ HOSP IP/OBS MODERATE 35: CPT | Performed by: INTERNAL MEDICINE

## 2021-08-23 RX ORDER — SODIUM CHLORIDE 9 MG/ML
INJECTION, SOLUTION INTRAVENOUS CONTINUOUS
Status: DISCONTINUED | OUTPATIENT
Start: 2021-08-23 | End: 2021-08-24

## 2021-08-23 RX ORDER — DEXTROSE MONOHYDRATE 25 G/50ML
50 INJECTION, SOLUTION INTRAVENOUS
Status: DISCONTINUED | OUTPATIENT
Start: 2021-08-23 | End: 2021-08-26

## 2021-08-23 RX ORDER — SUCRALFATE ORAL 1 G/10ML
1 SUSPENSION ORAL
Status: DISCONTINUED | OUTPATIENT
Start: 2021-08-23 | End: 2021-08-26

## 2021-08-23 RX ORDER — POTASSIUM CHLORIDE 20 MEQ/1
40 TABLET, EXTENDED RELEASE ORAL ONCE
Status: COMPLETED | OUTPATIENT
Start: 2021-08-23 | End: 2021-08-23

## 2021-08-23 RX ORDER — SODIUM CHLORIDE 9 MG/ML
INJECTION, SOLUTION INTRAVENOUS CONTINUOUS
Status: DISCONTINUED | OUTPATIENT
Start: 2021-08-23 | End: 2021-08-23

## 2021-08-23 NOTE — PROGRESS NOTES
[facility] [facility] - [facility]    Progress Note    Leonard Nolasco Patient Status:  Inpatient    1980 MRN [id number]   Location [facility] 5SW/SE Attending Vik Sherman MD   Hosp Day # 2 PCP Kendy Paige MD     HPI/Subjective:   Fanta Lowry ONLY)(CPT=74177)    Result Date: 8/22/2021  CONCLUSION:  1. Nonspecific diffuse colonic wall thickening may be due to underdistention or reflect underlying colitis. 2. No evidence of loculated, drainable fluid collection to suggest abscess formation.   3. -Diffuse ST depression + Nonspecific T-abnormality - ABNORMAL When compared with ECG of 11/21/2017 19:00:44 ST/T abnormality is worse.  Electronically signed on 08/22/2021 at 13:28 by Maryjane Kemp MD      Assessment & Plan:   Intractable nausea and Cotto Bodily

## 2021-08-23 NOTE — PLAN OF CARE
Patient alert and oriented x4. On room air. Insulin gtt infusing. Pt continuous to c/o nausea and vomiting, scheduled reglan given. BP elevated, PRN hydralazine given with little effect. Bed in lowest position and call light within reach.    Problem: Diabet nutritional consult as needed  - Establish a toileting routine/schedule  - Consider collaborating with pharmacy to review patient's medication profile  Outcome: Progressing  Goal: Maintains adequate nutritional intake (undernourished)  Description: Pavan Mitchell

## 2021-08-23 NOTE — PROGRESS NOTES
[facility]    Cardiology CHANDNI PERRIN KHAI - [facility] Progress Note       Bonnie Borrego Patient Status:  Inpatient    1980 MRN [id number]   Location [facility] 2W/SW Attending Osiris Moreno MD   Hosp Day # 2 PCP Gabrielle Clifton MD daily    Social History Narrative    None on file            Current Medications:  insulin detemir (LEVEMIR) 100 UNIT/ML flextouch 18 Units, 18 Units, Subcutaneous, Daily  glucose (DEX4) oral liquid 15 g, 15 g, Oral, Q15 Min PRN   Or  glucose-vitamin C (DE injection 10 mg, 10 mg, Intravenous, Q6H PRN  morphINE sulfate (PF) 2 MG/ML injection 2 mg, 2 mg, Intravenous, Q6H PRN      APPLE CIDER VINEGAR OR, Take by mouth every other day.   Albuterol Sulfate  (90 Base) MCG/ACT Inhalation Aero Soln, Inhale 2 p HEENT: No scleral icterus. External ears are normal.   - Lids are normal.  Oral mucosa is moist.  Neck: No JVD, no bruits. Cardiac: Normal rate, No murmur. Lungs: Clear without rhales, rhochi or wheezing. Abdomen: Soft, non-tender. No abdominal bruit. embolism to the level of the segmental pulmonary artery branches.       2.  No acute intrathoracic process is identified.       3. Dilatation of the main pulmonary artery trunk may relate to underlying pulmonary hypertension.       4. Lesser incidental find

## 2021-08-23 NOTE — PLAN OF CARE
Patient alert and oriented x4. Insulin gtt turned off. Complaints of nausea and vomiting, PRN zofran given. Poor appetite. PRN hydralazine given for high BP. Long term partner at bedside. Patient cleared to transfer to telemetry.   Problem: Patient Centered tube to low intermittent suction as ordered  - Evaluate effectiveness of ordered antiemetic medications  - Provide nonpharmacologic comfort measures as appropriate  - Advance diet as tolerated, if ordered  - Obtain nutritional consult as needed  - Evaluate for bleeding, hypotension and signs of decreased cardiac output  - Evaluate effectiveness of vasoactive medications to optimize hemodynamic stability  - Monitor arterial and/or venous puncture sites for bleeding and/or hematoma  - Assess quality of pulses,

## 2021-08-23 NOTE — PROGRESS NOTES
[facility]     Gastroenterology Progress Note    [facility] Patient Status:  Inpatient    1980 MRN [id number]   Location [facility] 2W/SW Attending Meredith Rowe MD   Hosp Day # 2 PCP Lis Bernabe MD given uncontrolled DM, ETOH, marijuana use and NSAID use which can contribute to CGE + emetogenic injury (e.g MW tear). I recommend gastric emptying scan as her symptoms have overall improved but DM is uncontrolled.  Would recommend non-emergent EGD at o pancreatitis. Follow-up MRCP of the abdomen with and without contrast is recommended for further assessment. 5. Intrauterine contraceptive device in place. 6. Hepatomegaly. 7. Status post cholecystectomy without significant hepatobiliary dilatation.   8.

## 2021-08-23 NOTE — TELEPHONE ENCOUNTER
Inpatient , needs FU  Is in room 218  Please call and book apt with Debi Coto   Please send for    - Meter and supplies  - Insulin 70/30 15 units BF and dinner  300

## 2021-08-23 NOTE — PLAN OF CARE
Problem: Patient Centered Care  Goal: Patient preferences are identified and integrated in the patient's plan of care  Description: Interventions:  - What would you like us to know as we care for you?  Lives with her long term partner  - Provide timely, c appropriate  - Advance diet as tolerated, if ordered  - Obtain nutritional consult as needed  - Evaluate fluid balance  Outcome: Progressing  Goal: Maintains or returns to baseline bowel function  Description: INTERVENTIONS:  - Assess bowel function  - Theone Furnish Monitor arterial and/or venous puncture sites for bleeding and/or hematoma  - Assess quality of pulses, skin color and temperature  - Assess for signs of decreased coronary artery perfusion - ex.  Angina  - Evaluate fluid balance, assess for edema, trend we

## 2021-08-23 NOTE — DIABETES ED
[facility] - [facility]    Diabetes Education  Note    Alessandra Steinberg Patient Status:  Inpatient   1980 MRN [id number]  Location [facility] 2W/SW Attending Jenelle Montes De Oca MD  Hosp Day # 2 PCP Ashley Martinez MD      Labs:    HgbA1C term complications  · Importance of close follow up with PCP and medical team    Patient verbalized understanding, was receptive to information provided.       Recommendations:  Patient to self-administer all insulin doses with RN supervision   Attend outpa

## 2021-08-23 NOTE — PROGRESS NOTES
Pt cleared to transfer to telemetry floor. Report given from this RN to Alanna Phalen at extension [phone]. Skin intact, no major skin issues. Transfer skin check done by second RN, Patsey Babinski.

## 2021-08-23 NOTE — PAYOR COMM NOTE
--------------  ADMISSION REVIEW     Payor: LISA GONZALEZ  Subscriber #:  [id number]  Authorization Number: [id number]    Admit date: 8/21/21  Admit time:  1:57 PM     Patient Seen in: [facility] Emergency Department    History   Patient presents with: There is abdominal tenderness in the epigastric area. There is no guarding.      Labs Reviewed   BASIC METABOLIC PANEL (8) - Abnormal; Notable for the following components:       Result Value    Glucose 393 (*)     Sodium 134 (*)     Potassium 3.2 (*)     A mL Intravenous Stopped 8/21/21 1229)   metoclopramide (REGLAN) injection 10 mg (10 mg Intravenous Given 8/21/21 0810)   potassium chloride 40 mEq in sodium chloride 0.9% 250 mL IVPB (40 mEq Intravenous Handoff 8/21/21 1214)   Pantoprazole Sodium (PROTONIX) she might have eaten. Family members are at bedside and helping with the history. Currently she denies any chest pain, shortness of breath, palpitations. She is endorsing nausea, vomiting, epigastric pain.   Denies any fevers or chills, headaches or blur closely. Will start the patient on clear liquid diet. 2.   Hyperglycemia. Currently, will order the patient an HbA1c, start the patient on Accu-Cheks before meals and at bedtime, and supplemental insulin with sliding scale.   Patient does have glucosur (CPT=71260)     Result Date: 8/22/2021  CONCLUSION:  1. No evidence of acute pulmonary embolism to the level of the segmental pulmonary artery branches. 2. No acute intrathoracic process is identified.   3. Dilatation of the main pulmonary artery trunk may instructed.     She has some mild epigastric pain from vomiting, denies any melena. Her vomitus occasionally looks coffee grounds in color.   She has been using some ibuprofen as needed few tablets a week for headaches and joint pains.     Her  is i with patient and .   –Okay to use Xanax to help with sleep but also reduce vomiting  –Tight diabetic control, agree with insulin drip to get blood sugars to closer to normal range which will help with the nausea and vomiting  –DVT prophylaxis  –Clear LVH on echo so likely underlying uncontrolled HTN as well. - IV hydralazine PRN, will also add BB     #Sinus tachycardia    - EKG showed sinus tach and short [place] syndrome. NDm=556. RSR (V1).  Diffuse ST depression and nonspecific T-abnormality.   - Suspect Date Action Dose Route User    8/23/2021 1013 Given 10 mg Intravenous Denver Cote    8/23/2021 0124 Given 10 mg Intravenous Aurora Robles    8/22/2021 1202 Given 10 mg Intravenous Alee Montejo, RN      Insulin Aspart Pen (NOVOLOG) 100 UNIT/ML flexpen 5 Kimberly    8/22/2021 1233 Given 10 mg Intravenous Karen Mijares RN      metoprolol Tartrate (LOPRESSOR) injection 5 mg     Date Action Dose Route User    8/23/2021 0545 Given 5 mg Intravenous Radha Garcia    8/22/2021 2235 Given 5 mg Intravenous NADEGE Aguilar 176/79  95 %  —  None (Room air)  — LP    08/23/21 0500  —  119  24  (!) 168/78  99 %  —  None (Room air)  — LP    08/23/21 0400  99.5 °F (37.5 °C)  116  24  (!) 164/74  99 %  —  None (Room air)  — LP    08/23/21 0300  —  110  16  147/80  100 %  —  None (R 0411  98.9 °F (37.2 °C)  (!) 130  20  154/77  96 %  —  None (Room air)  — AM    08/22/21 0240  —  (!) 129  —  148/72  —  —  —  — AM    08/22/21 0156  —  (!) 122  —  (!) 186/83  —  —  —  — AM

## 2021-08-24 LAB
ANION GAP SERPL CALC-SCNC: 14 MMOL/L (ref 0–18)
BASOPHILS # BLD AUTO: 0.02 X10(3) UL (ref 0–0.2)
BASOPHILS NFR BLD AUTO: 0.3 %
BUN BLD-MCNC: 11 MG/DL (ref 7–18)
BUN/CREAT SERPL: 19.6 (ref 10–20)
CALCIUM BLD-MCNC: 7.8 MG/DL (ref 8.5–10.1)
CHLORIDE SERPL-SCNC: 105 MMOL/L (ref 98–112)
CO2 SERPL-SCNC: 18 MMOL/L (ref 21–32)
CREAT BLD-MCNC: 0.56 MG/DL
DEPRECATED RDW RBC AUTO: 42.5 FL (ref 35.1–46.3)
EOSINOPHIL # BLD AUTO: 0 X10(3) UL (ref 0–0.7)
EOSINOPHIL NFR BLD AUTO: 0 %
ERYTHROCYTE [DISTWIDTH] IN BLOOD BY AUTOMATED COUNT: 12.6 % (ref 11–15)
GLUCOSE BLD-MCNC: 272 MG/DL (ref 70–99)
GLUCOSE BLDC GLUCOMTR-MCNC: 227 MG/DL (ref 70–99)
GLUCOSE BLDC GLUCOMTR-MCNC: 232 MG/DL (ref 70–99)
GLUCOSE BLDC GLUCOMTR-MCNC: 237 MG/DL (ref 70–99)
GLUCOSE BLDC GLUCOMTR-MCNC: 284 MG/DL (ref 70–99)
HAV IGM SER QL: 2.1 MG/DL (ref 1.6–2.6)
HCT VFR BLD AUTO: 37.5 %
HGB BLD-MCNC: 13 G/DL
IMM GRANULOCYTES # BLD AUTO: 0.03 X10(3) UL (ref 0–1)
IMM GRANULOCYTES NFR BLD: 0.4 %
LYMPHOCYTES # BLD AUTO: 1.36 X10(3) UL (ref 1–4)
LYMPHOCYTES NFR BLD AUTO: 17.1 %
MCH RBC QN AUTO: 31.9 PG (ref 26–34)
MCHC RBC AUTO-ENTMCNC: 34.7 G/DL (ref 31–37)
MCV RBC AUTO: 92.1 FL
MONOCYTES # BLD AUTO: 0.47 X10(3) UL (ref 0.1–1)
MONOCYTES NFR BLD AUTO: 5.9 %
NEUTROPHILS # BLD AUTO: 6.06 X10 (3) UL (ref 1.5–7.7)
NEUTROPHILS # BLD AUTO: 6.06 X10(3) UL (ref 1.5–7.7)
NEUTROPHILS NFR BLD AUTO: 76.3 %
OSMOLALITY SERPL CALC.SUM OF ELEC: 293 MOSM/KG (ref 275–295)
PHOSPHATE SERPL-MCNC: 1.5 MG/DL (ref 2.5–4.9)
PLATELET # BLD AUTO: 312 10(3)UL (ref 150–450)
POTASSIUM SERPL-SCNC: 3.5 MMOL/L (ref 3.5–5.1)
RBC # BLD AUTO: 4.07 X10(6)UL
SODIUM SERPL-SCNC: 137 MMOL/L (ref 136–145)
TSI SER-ACNC: 2.26 MIU/ML (ref 0.36–3.74)
WBC # BLD AUTO: 7.9 X10(3) UL (ref 4–11)

## 2021-08-24 PROCEDURE — 99232 SBSQ HOSP IP/OBS MODERATE 35: CPT | Performed by: PHYSICIAN ASSISTANT

## 2021-08-24 PROCEDURE — 99232 SBSQ HOSP IP/OBS MODERATE 35: CPT | Performed by: INTERNAL MEDICINE

## 2021-08-24 PROCEDURE — 99233 SBSQ HOSP IP/OBS HIGH 50: CPT | Performed by: HOSPITALIST

## 2021-08-24 RX ORDER — AMLODIPINE BESYLATE 5 MG/1
5 TABLET ORAL ONCE
Status: COMPLETED | OUTPATIENT
Start: 2021-08-24 | End: 2021-08-24

## 2021-08-24 RX ORDER — LISINOPRIL 10 MG/1
10 TABLET ORAL DAILY
Status: DISCONTINUED | OUTPATIENT
Start: 2021-08-24 | End: 2021-08-26

## 2021-08-24 RX ORDER — AMLODIPINE BESYLATE 10 MG/1
10 TABLET ORAL DAILY
Status: DISCONTINUED | OUTPATIENT
Start: 2021-08-25 | End: 2021-08-26

## 2021-08-24 RX ORDER — POTASSIUM CHLORIDE 1.5 G/1.77G
40 POWDER, FOR SOLUTION ORAL ONCE
Status: COMPLETED | OUTPATIENT
Start: 2021-08-24 | End: 2021-08-24

## 2021-08-24 RX ORDER — PROCHLORPERAZINE EDISYLATE 5 MG/ML
10 INJECTION INTRAMUSCULAR; INTRAVENOUS EVERY 6 HOURS PRN
Status: DISCONTINUED | OUTPATIENT
Start: 2021-08-24 | End: 2021-08-26

## 2021-08-24 RX ORDER — AMLODIPINE BESYLATE 5 MG/1
5 TABLET ORAL DAILY
Status: DISCONTINUED | OUTPATIENT
Start: 2021-08-24 | End: 2021-08-24

## 2021-08-24 NOTE — PROGRESS NOTES
[place] [facility]    Progress Note    Isidoro Mejia Patient Status:  Inpatient    1980 MRN [id number]   Location [facility] 5SW/SE Attending Roque Resendez MD   Hosp Day # 3 PCP Jake Hancock MD     HPI/Subjective:   Cindy Chance -Short [place] syndrome Nati = 100 -RSR(V1).  -Diffuse ST depression + Nonspecific T-abnormality - ABNORMAL When compared with ECG of 11/21/2017 19:00:44 ST/T abnormality is worse.  Electronically signed on 08/22/2021 at 13:28 by Maryjane Kemp MD      Assessm

## 2021-08-24 NOTE — PROGRESS NOTES
[facility]     Gastroenterology Progress Note    Hiren Anderson Patient Status:  Inpatient    1980 MRN [id number]   Location [facility] 2W/SW Attending Lydia Barriga MD   Hosp Day # 3 PCP Sahara Pineda MD + emetogenic injury (e.g MW tear). No further nausea/vomiting or episodes of coffee-ground emesis. Hgb stable. Gastric Emptying Scan not able to be performed d/t medications given during this admission - complete outpatient.      Would recommend non-denilson

## 2021-08-24 NOTE — PROGRESS NOTES
[place] [facility] - [facility]    Cardiology CHANDNI PERRIN KHAI - HUMACAO Progress Note       Bonnie Borrego Patient Status:  Inpatient    1980 MRN [id number]   Location [facility] 2W/SW Attending Osiris Moreno MD   Hosp Day # 3 PCP Gabrielle Clifton MD daily    Social History Narrative    None on file          Allergies:  No Known Allergies    Review of Systems:   A comprehensive 12 point review of system was performed. All pertinent positive and negative findings per HPI.     Physical Exam:   Blood pres Musculoskeletal: No joint effusions.     Results:     Laboratory Data:  Lab Results   Component Value Date    WBC 7.9 08/24/2021    HGB 13.0 08/24/2021    HCT 37.5 08/24/2021    .0 08/24/2021    CREATSERUM 0.64 08/23/2021    BUN 19 (H) 08/23/2021 tachycardic on monitor. White count remains elevated, but improved. #2. Uncontrolled insulin-dependent diabetes with hemoglobin A1c at 13 on admission. Patient is on insulin. #3. Persistent nausea and vomiting.   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

## 2021-08-24 NOTE — PAYOR COMM NOTE
--------------  CONTINUED STAY REVIEW----REQUESTING ADDITIONAL DAY 8/24      Payor: Juan Manuel Brady PPO  Subscriber #:  [id number]  Authorization Number: [id number]    Admit date: 8/21/21  Admit time:  1:57 PM    Admitting Physician: Lydia Barriga MD  Attendin       No results found.   EKG 12 Lead     Result Date: 8/22/2021  ECG Report  Interpretation  -------------------------- Sinus Tachycardia -Short [place] syndrome Nati = 100 -RSR(V1).  -Diffuse ST depression + Nonspecific T-abnormality - ABNORMAL When compared Bear Tavares RN      amLODIPine (NORVASC) tab 5 mg     Date Action Dose Route User    8/24/2021 1350 Given 5 mg Oral Shanel Samayoa RN      hydrALAzine HCl (APRESOLINE) injection 10 mg     Date Action Dose Route User    8/24/2021 1138 Given 10 mg push     Date Action Dose Route User    8/24/2021 1230 Given 40 mg Intravenous Mayra Dang RN    8/23/2021 2133 Given 40 mg Intravenous Chana Mosqueda RN      potassium & sodium phosphates (NEUTRA-PHOS) 280-160-250 MG powder packet 1 packet     Date

## 2021-08-24 NOTE — PROGRESS NOTES
[facility]    Progress Note    Blane Cisneros Patient Status:  Inpatient    1980 MRN [id number]   Location [facility] 2W/SW Attending Elizabeth Barragan MD   Hosp Day # 4 PCP Moise Escobar MD     Subjective:  Nausea is Intractable nausea and vomiting     Acute upper GI bleeding      DKA  Now resolved  She has been on an insulin drip  Will transition to subcutaneous insulin    PLAN:   Levemir 28  daily  Novolog 12 with CF with meals  Accuchecks ac and hs  Hypoglycemia pro

## 2021-08-24 NOTE — PLAN OF CARE
Pt is alert and oriented x4. Self care. Transfer to unit from CCU. Blood sugars stable. NPO since midnight for gastric emptying scan. Call light within reach.    Problem: Patient Centered Care  Goal: Patient preferences are identified and integrated in the Nasogastric tube to low intermittent suction as ordered  - Evaluate effectiveness of ordered antiemetic medications  - Provide nonpharmacologic comfort measures as appropriate  - Advance diet as tolerated, if ordered  - Obtain nutritional consult as needed trends  - Monitor for bleeding, hypotension and signs of decreased cardiac output  - Evaluate effectiveness of vasoactive medications to optimize hemodynamic stability  - Monitor arterial and/or venous puncture sites for bleeding and/or hematoma  - Assess

## 2021-08-24 NOTE — DIABETES ED
[facility]    Diabetes Education  Note    Pilo Rodriguez Patient Status:  Inpatient   1980 MRN [id number]  Location [facility] 3W/SW Attending Jesica Oswald MD  Hosp Day # 3 PCP Lulu Ruiz MD      Labs:    HgbA1C

## 2021-08-25 LAB
ANION GAP SERPL CALC-SCNC: 10 MMOL/L (ref 0–18)
ANION GAP SERPL CALC-SCNC: 11 MMOL/L (ref 0–18)
ANION GAP SERPL CALC-SCNC: 22 MMOL/L (ref 0–18)
BASOPHILS # BLD AUTO: 0.02 X10(3) UL (ref 0–0.2)
BASOPHILS NFR BLD AUTO: 0.3 %
BUN BLD-MCNC: 15 MG/DL (ref 7–18)
BUN BLD-MCNC: 16 MG/DL (ref 7–18)
BUN BLD-MCNC: 17 MG/DL (ref 7–18)
BUN/CREAT SERPL: 23.1 (ref 10–20)
BUN/CREAT SERPL: 24.6 (ref 10–20)
BUN/CREAT SERPL: 32.7 (ref 10–20)
CALCIUM BLD-MCNC: 7.2 MG/DL (ref 8.5–10.1)
CALCIUM BLD-MCNC: 7.6 MG/DL (ref 8.5–10.1)
CALCIUM BLD-MCNC: 8.2 MG/DL (ref 8.5–10.1)
CHLORIDE SERPL-SCNC: 106 MMOL/L (ref 98–112)
CHLORIDE SERPL-SCNC: 108 MMOL/L (ref 98–112)
CHLORIDE SERPL-SCNC: 108 MMOL/L (ref 98–112)
CO2 SERPL-SCNC: 20 MMOL/L (ref 21–32)
CO2 SERPL-SCNC: 21 MMOL/L (ref 21–32)
CO2 SERPL-SCNC: 9 MMOL/L (ref 21–32)
CREAT BLD-MCNC: 0.52 MG/DL
CREAT BLD-MCNC: 0.65 MG/DL
CREAT BLD-MCNC: 0.65 MG/DL
DEPRECATED RDW RBC AUTO: 42.4 FL (ref 35.1–46.3)
EOSINOPHIL # BLD AUTO: 0 X10(3) UL (ref 0–0.7)
EOSINOPHIL NFR BLD AUTO: 0 %
ERYTHROCYTE [DISTWIDTH] IN BLOOD BY AUTOMATED COUNT: 12.8 % (ref 11–15)
GLUCOSE BLD-MCNC: 110 MG/DL (ref 70–99)
GLUCOSE BLD-MCNC: 188 MG/DL (ref 70–99)
GLUCOSE BLD-MCNC: 289 MG/DL (ref 70–99)
GLUCOSE BLDC GLUCOMTR-MCNC: 104 MG/DL (ref 70–99)
GLUCOSE BLDC GLUCOMTR-MCNC: 149 MG/DL (ref 70–99)
GLUCOSE BLDC GLUCOMTR-MCNC: 186 MG/DL (ref 70–99)
GLUCOSE BLDC GLUCOMTR-MCNC: 283 MG/DL (ref 70–99)
GLUCOSE BLDC GLUCOMTR-MCNC: 98 MG/DL (ref 70–99)
HAV IGM SER QL: 2.2 MG/DL (ref 1.6–2.6)
HCT VFR BLD AUTO: 35.8 %
HGB BLD-MCNC: 12.4 G/DL
IMM GRANULOCYTES # BLD AUTO: 0.04 X10(3) UL (ref 0–1)
IMM GRANULOCYTES NFR BLD: 0.5 %
LYMPHOCYTES # BLD AUTO: 1.66 X10(3) UL (ref 1–4)
LYMPHOCYTES NFR BLD AUTO: 21.3 %
MCH RBC QN AUTO: 31.6 PG (ref 26–34)
MCHC RBC AUTO-ENTMCNC: 34.6 G/DL (ref 31–37)
MCV RBC AUTO: 91.3 FL
MONOCYTES # BLD AUTO: 0.39 X10(3) UL (ref 0.1–1)
MONOCYTES NFR BLD AUTO: 5 %
NEUTROPHILS # BLD AUTO: 5.68 X10 (3) UL (ref 1.5–7.7)
NEUTROPHILS # BLD AUTO: 5.68 X10(3) UL (ref 1.5–7.7)
NEUTROPHILS NFR BLD AUTO: 72.9 %
OSMOLALITY SERPL CALC.SUM OF ELEC: 290 MOSM/KG (ref 275–295)
OSMOLALITY SERPL CALC.SUM OF ELEC: 295 MOSM/KG (ref 275–295)
OSMOLALITY SERPL CALC.SUM OF ELEC: 295 MOSM/KG (ref 275–295)
PHOSPHATE SERPL-MCNC: 2.1 MG/DL (ref 2.5–4.9)
PLATELET # BLD AUTO: 337 10(3)UL (ref 150–450)
POTASSIUM SERPL-SCNC: 3.2 MMOL/L (ref 3.5–5.1)
POTASSIUM SERPL-SCNC: 3.3 MMOL/L (ref 3.5–5.1)
POTASSIUM SERPL-SCNC: 3.5 MMOL/L (ref 3.5–5.1)
RBC # BLD AUTO: 3.92 X10(6)UL
SODIUM SERPL-SCNC: 137 MMOL/L (ref 136–145)
SODIUM SERPL-SCNC: 139 MMOL/L (ref 136–145)
SODIUM SERPL-SCNC: 139 MMOL/L (ref 136–145)
WBC # BLD AUTO: 7.8 X10(3) UL (ref 4–11)

## 2021-08-25 PROCEDURE — 99233 SBSQ HOSP IP/OBS HIGH 50: CPT | Performed by: HOSPITALIST

## 2021-08-25 PROCEDURE — 99232 SBSQ HOSP IP/OBS MODERATE 35: CPT | Performed by: INTERNAL MEDICINE

## 2021-08-25 RX ORDER — LORAZEPAM 2 MG/ML
0.5 INJECTION INTRAMUSCULAR EVERY 4 HOURS PRN
Status: DISCONTINUED | OUTPATIENT
Start: 2021-08-25 | End: 2021-08-26

## 2021-08-25 RX ORDER — DEXTROSE MONOHYDRATE 25 G/50ML
50 INJECTION, SOLUTION INTRAVENOUS
Status: DISCONTINUED | OUTPATIENT
Start: 2021-08-25 | End: 2021-08-26

## 2021-08-25 RX ORDER — POTASSIUM CHLORIDE 14.9 MG/ML
20 INJECTION INTRAVENOUS ONCE
Status: COMPLETED | OUTPATIENT
Start: 2021-08-25 | End: 2021-08-26

## 2021-08-25 RX ORDER — CARVEDILOL 12.5 MG/1
12.5 TABLET ORAL 2 TIMES DAILY WITH MEALS
Status: DISCONTINUED | OUTPATIENT
Start: 2021-08-25 | End: 2021-08-26

## 2021-08-25 NOTE — PROGRESS NOTES
Recv'd telephone report from MICHAEL. [address], patient arrived to CCU room 227. Patient is A&OX4, denies any pain, slight nausea. VSS, accucheck was 98. RN contacted Dr. Ngozi Vick for update and further orders. Unable to start insulin gtt with pts BS of 98.  Was defered to

## 2021-08-25 NOTE — PLAN OF CARE
Pt continued to experience nausea throughout the day. Earlier portion of the day, the pt stated she felt better than later on. Partner at bedside during day. Hypertension controlled.     Problem: Patient Centered Care  Goal: Patient preferences are identifi and tolerated  - Nasogastric tube to low intermittent suction as ordered  - Evaluate effectiveness of ordered antiemetic medications  - Provide nonpharmacologic comfort measures as appropriate  - Advance diet as tolerated, if ordered  - Obtain nutritional signs, rhythm, and trends  - Monitor for bleeding, hypotension and signs of decreased cardiac output  - Evaluate effectiveness of vasoactive medications to optimize hemodynamic stability  - Monitor arterial and/or venous puncture sites for bleeding and/or

## 2021-08-25 NOTE — PAYOR COMM NOTE
--------------  CONTINUED STAY REVIEW----REQUESTING ADDITIONAL DAY 8/25      Payor: [place] [facility]  Subscriber #:  [id number]  Authorization Number: [id number]    Admit date: 8/21/21  Admit time:  1:57 PM    Admitting Physician: Jolyn Libman, MD  Attendin clubbing  Neurologic:  nonfocal  Psychiatric:  Normal affect, calm and appropriate     Intake/Output Summary (Last 24 hours) at 8/25/2021 1242  Last data filed at 8/25/2021 1000      Gross per 24 hour   Intake 1097 ml   Output —   Net 1097 ml           --     ETOH <3  --   --   --   --   --   --   --   --      < > = values in this interval not displayed.         No results found.        Medications      • carvedilol  12.5 mg Oral BID with meals   • Insulin Aspart Pen  12 Units Subcutaneous TID CC   • [STA PRITI Ugarte      Insulin Aspart Pen (NOVOLOG) 100 UNIT/ML flexpen 12 Units     Date Action Dose Route User    8/25/2021 1337 Given 12 Units Subcutaneous (Left Lower Abdomen) Shanel Samayoa, RN      insulin detemir (LEVEMIR) 100 UNIT/ML flextouch 24 Units

## 2021-08-25 NOTE — PROGRESS NOTES
[place] [facility] - [facility]  Progress Note     Joslyn Tomas  : 1980    Status: Inpatient  Day #: 4    Attending: Massiel Morrell MD  PCP: Chantelle Dougherty MD      Assessment and Plan     DKA   - was on insulin drip and DKA resolved - transitioned to conley 1000  Gross per 24 hour   Intake 1097 ml   Output —   Net 1097 ml         Recent Labs   Lab 08/23/21  0439 08/24/21  0714 08/25/21  0738   WBC 13.8* 7.9 7.8   HGB 12.5 13.0 12.4   HCT 36.5 37.5 35.8   .0 312.0 337.0   RBC 3.98 4.07 3.92   MCV 91.7 9 Subcutaneous TID CC   • [START ON 8/26/2021] insulin detemir  28 Units Subcutaneous Daily   • lisinopril  10 mg Oral Daily   • amLODIPine  10 mg Oral Daily   • Insulin Aspart Pen  1-5 Units Subcutaneous TID CC   • sucralfate  1 g Oral TID AC and HS   • pan

## 2021-08-25 NOTE — PLAN OF CARE
Experienced some mild nausea today. BP meds adjusted per cards. Pt eager to be discharged home. Gastric emptying study to be ordered as outpatient. CO2 critical at 9. New IV site established. Pt transferred to CCU. Report given to [facility].     Problem: Rosario Rushing INTERVENTIONS:  - Maintain adequate hydration with IV or PO as ordered and tolerated  - Nasogastric tube to low intermittent suction as ordered  - Evaluate effectiveness of ordered antiemetic medications  - Provide nonpharmacologic comfort measures as appr hemodynamic stability  Description: INTERVENTIONS:  - Monitor vital signs, rhythm, and trends  - Monitor for bleeding, hypotension and signs of decreased cardiac output  - Evaluate effectiveness of vasoactive medications to optimize hemodynamic stability

## 2021-08-25 NOTE — PROGRESS NOTES
Patient seen in follow up. HR better but BP still elevated. Nausea is better. No reported chest pain or sob. No new complaints. Chart reviewed. Review of systems: Constitutional: Negative for fever. Respiratory: Negative for shortness of breath. Intravenous, Q12H  ALPRAZolam (XANAX) tab 0.25 mg, 0.25 mg, Oral, Q6H PRN  metoclopramide (REGLAN) injection 10 mg, 10 mg, Intravenous, Q8H  metoprolol Tartrate (LOPRESSOR) injection 5 mg, 5 mg, Intravenous, PRN   Or  metoprolol Tartrate (LOPRESSOR) inject

## 2021-08-25 NOTE — PLAN OF CARE
Patient alert and oriented but anxious at times. On RA. Voiding freely. No pain complaints overnight. No N/V overnight. IV Protonix, and Reglan. ACHS. Declining Heparin SubQ for DVT prophylaxis. Plan to monitor BP and BG.  Discharge home once medically cory vomiting  Description: INTERVENTIONS:  - Maintain adequate hydration with IV or PO as ordered and tolerated  - Nasogastric tube to low intermittent suction as ordered  - Evaluate effectiveness of ordered antiemetic medications  - Provide nonpharmacologic c cardiac output and hemodynamic stability  Description: INTERVENTIONS:  - Monitor vital signs, rhythm, and trends  - Monitor for bleeding, hypotension and signs of decreased cardiac output  - Evaluate effectiveness of vasoactive medications to optimize hemo

## 2021-08-26 ENCOUNTER — TELEPHONE (OUTPATIENT)
Dept: ENDOCRINOLOGY CLINIC | Facility: CLINIC | Age: 41
End: 2021-08-26

## 2021-08-26 VITALS
WEIGHT: 164.69 LBS | BODY MASS INDEX: 29.18 KG/M2 | OXYGEN SATURATION: 98 % | DIASTOLIC BLOOD PRESSURE: 73 MMHG | SYSTOLIC BLOOD PRESSURE: 118 MMHG | HEIGHT: 63 IN | TEMPERATURE: 98 F | HEART RATE: 87 BPM | RESPIRATION RATE: 18 BRPM

## 2021-08-26 LAB
ANION GAP SERPL CALC-SCNC: 15 MMOL/L (ref 0–18)
BUN BLD-MCNC: 15 MG/DL (ref 7–18)
BUN/CREAT SERPL: 33.3 (ref 10–20)
CALCIUM BLD-MCNC: 7.5 MG/DL (ref 8.5–10.1)
CHLORIDE SERPL-SCNC: 107 MMOL/L (ref 98–112)
CO2 SERPL-SCNC: 18 MMOL/L (ref 21–32)
CREAT BLD-MCNC: 0.45 MG/DL
GLUCOSE BLD-MCNC: 221 MG/DL (ref 70–99)
GLUCOSE BLDC GLUCOMTR-MCNC: 241 MG/DL (ref 70–99)
GLUCOSE BLDC GLUCOMTR-MCNC: 287 MG/DL (ref 70–99)
OSMOLALITY SERPL CALC.SUM OF ELEC: 298 MOSM/KG (ref 275–295)
PHOSPHATE SERPL-MCNC: 2.1 MG/DL (ref 2.5–4.9)
POTASSIUM SERPL-SCNC: 3.3 MMOL/L (ref 3.5–5.1)
SODIUM SERPL-SCNC: 140 MMOL/L (ref 136–145)

## 2021-08-26 PROCEDURE — 99232 SBSQ HOSP IP/OBS MODERATE 35: CPT | Performed by: INTERNAL MEDICINE

## 2021-08-26 PROCEDURE — 99239 HOSP IP/OBS DSCHRG MGMT >30: CPT | Performed by: HOSPITALIST

## 2021-08-26 RX ORDER — PEN NEEDLE, DIABETIC 32GX 5/32"
NEEDLE, DISPOSABLE MISCELLANEOUS
Qty: 100 EACH | Refills: 6 | Status: SHIPPED | OUTPATIENT
Start: 2021-08-26

## 2021-08-26 RX ORDER — SUCRALFATE ORAL 1 G/10ML
1 SUSPENSION ORAL
Qty: 1200 ML | Refills: 0 | Status: SHIPPED | OUTPATIENT
Start: 2021-08-26 | End: 2022-01-17

## 2021-08-26 RX ORDER — PANTOPRAZOLE SODIUM 40 MG/1
40 TABLET, DELAYED RELEASE ORAL
Qty: 30 TABLET | Refills: 0 | Status: SHIPPED | OUTPATIENT
Start: 2021-08-26 | End: 2021-09-30

## 2021-08-26 RX ORDER — AMLODIPINE BESYLATE 10 MG/1
10 TABLET ORAL DAILY
Qty: 30 TABLET | Refills: 0 | Status: SHIPPED | OUTPATIENT
Start: 2021-08-27 | End: 2021-09-01

## 2021-08-26 RX ORDER — INSULIN LISPRO 100 [IU]/ML
INJECTION, SOLUTION INTRAVENOUS; SUBCUTANEOUS
Qty: 51 ML | Refills: 0 | Status: SHIPPED | OUTPATIENT
Start: 2021-08-26 | End: 2021-09-30

## 2021-08-26 RX ORDER — ONDANSETRON 4 MG/1
4 TABLET, ORALLY DISINTEGRATING ORAL EVERY 6 HOURS PRN
Qty: 30 TABLET | Refills: 1 | Status: SHIPPED | OUTPATIENT
Start: 2021-08-26 | End: 2021-09-30

## 2021-08-26 RX ORDER — LISINOPRIL 10 MG/1
10 TABLET ORAL DAILY
Qty: 30 TABLET | Refills: 0 | Status: SHIPPED | OUTPATIENT
Start: 2021-08-27 | End: 2021-09-01

## 2021-08-26 RX ORDER — PANTOPRAZOLE SODIUM 40 MG/1
40 TABLET, DELAYED RELEASE ORAL
Qty: 60 TABLET | Refills: 0 | Status: SHIPPED | OUTPATIENT
Start: 2021-08-26 | End: 2021-08-26

## 2021-08-26 RX ORDER — BLOOD SUGAR DIAGNOSTIC
STRIP MISCELLANEOUS
Qty: 50 STRIP | Refills: 6 | Status: SHIPPED | OUTPATIENT
Start: 2021-08-26

## 2021-08-26 RX ORDER — INSULIN GLARGINE 100 [IU]/ML
28 INJECTION, SOLUTION SUBCUTANEOUS EVERY MORNING
Qty: 15 ML | Refills: 0 | Status: SHIPPED | OUTPATIENT
Start: 2021-08-26 | End: 2021-10-08

## 2021-08-26 RX ORDER — INSULIN LISPRO 100 [IU]/ML
INJECTION, SOLUTION INTRAVENOUS; SUBCUTANEOUS
Qty: 15 ML | Refills: 0 | Status: SHIPPED | OUTPATIENT
Start: 2021-08-26 | End: 2021-08-26

## 2021-08-26 RX ORDER — PANTOPRAZOLE SODIUM 40 MG/1
40 TABLET, DELAYED RELEASE ORAL
Status: DISCONTINUED | OUTPATIENT
Start: 2021-08-26 | End: 2021-08-26

## 2021-08-26 RX ORDER — CARVEDILOL 12.5 MG/1
12.5 TABLET ORAL 2 TIMES DAILY WITH MEALS
Qty: 60 TABLET | Refills: 0 | Status: SHIPPED | OUTPATIENT
Start: 2021-08-26 | End: 2021-09-01

## 2021-08-26 RX ORDER — BLOOD-GLUCOSE METER
EACH MISCELLANEOUS
Qty: 1 KIT | Refills: 0 | Status: SHIPPED | OUTPATIENT
Start: 2021-08-26

## 2021-08-26 NOTE — PROGRESS NOTES
Patient discharged home with significant other. Reviewed discharge instructions with patient including follow up with cardiology in 1 week- call to schedule.  Follow up with primary care physician in 1-2 weeks, patient has appointment with endocrinology on

## 2021-08-26 NOTE — TELEPHONE ENCOUNTER
Received call from Dr. Petra Gaston and states regimen will be changed to the following and to send to pharmacy.      Lantus 28 units in the morning  Humalog 12-17 units TID w/ meals   Pen needles  [address])    Scripts sent as per

## 2021-08-26 NOTE — DISCHARGE SUMMARY
[facility]  Discharge Summary     Jeannette Luke  : 1980    Status: Inpatient  Day #: 5    Attending: Lakeisha Ortega MD  PCP: Hai Hall MD     Date of Admission:  2021  Date of Discharge:  2021     [facility] Discharge Florentino Caruso daily basis - counseled to stop  - CT abdomen and pelvis reviewed - indeterminate pancreatic lesion, GI on board will appreciate recs.    - resolved     Accelerated HTN  - likely in the setting of pain  - con't meds     Sinus Tachycardia  - likely due to ab (12.5 mg total) by mouth 2 (two) times daily with meals. Quantity: 60 tablet  Refills: 0     lisinopril 10 MG Tabs  Start taking on: August 27, 2021      Take 1 tablet (10 mg total) by mouth daily.    Quantity: 30 tablet  Refills: 0     ondansetron 4 MG T HumaLOG KwikPen 100 UNIT/ML Sopn  Generic drug: Insulin Lispro (1 Unit Dial)      INJECT 12-17 UNITS UNDER THE SKIN THREE TIMES A DAY WITH MEALS   Quantity: 15 mL  Refills: 0     Lantus SoloStar 100 UNIT/ML Sopn  Generic drug: insulin glargine      Injec

## 2021-08-26 NOTE — PROGRESS NOTES
[facility] Pharmacy Note: Route Optimization for Pantoprazole (PROTONIX)    Patient is currently on Pantoprazole (PROTONIX) 40 mg IV every 12 hours.    The patient meets the criteria to convert to the oral equivalent as established by the IV to Oral conversion pro

## 2021-08-26 NOTE — PROGRESS NOTES
[place] [facility]    Progress Note    Demarcus Babcock Patient Status:  Inpatient    1980 MRN [id number]   Location [facility] 2W/SW Attending Loni Hidalgo MD   Hosp Day # 5 PCP Yariel Galeano MD     Subjective:  Feeling be Acute upper GI bleeding      DKA  Now resolved  She has been on an insulin drip  Will transition to subcutaneous insulin    PLAN:   Levemir 28  daily  Novolog 12 with CF with meals  Accuchecks ac and hs  Hypoglycemia protocol  Diabetic diet      We will fo

## 2021-08-26 NOTE — PLAN OF CARE
Santiago Sanchez is resting in bed. Bed alarm is on, in the lowest position, and the call light is within reach.     Problem: Diabetes/Glucose Control  Goal: Glucose maintained within prescribed range  Description: INTERVENTIONS:  - Monitor Blood Glucose as ordered preferences  - Enhance eating environment  Outcome: Progressing     Problem: MUSCULOSKELETAL - ADULT  Goal: Return mobility to safest level of function  Description: INTERVENTIONS:  - Assess patient stability and activity tolerance for standing, transferri

## 2021-08-27 ENCOUNTER — PATIENT OUTREACH (OUTPATIENT)
Dept: CASE MANAGEMENT | Age: 41
End: 2021-08-27

## 2021-08-27 DIAGNOSIS — Z02.9 ENCOUNTERS FOR ADMINISTRATIVE PURPOSE: ICD-10-CM

## 2021-08-27 NOTE — PROGRESS NOTES
NCM attempted to contact the patient for HFU. No answer and unable to leave a message as the MB is full. NCM will try again another time.

## 2021-08-27 NOTE — PAYOR COMM NOTE
--------------  DISCHARGE REVIEW    Payor: LISA GONZALEZ  Subscriber #:  [id number]  Authorization Number: [id number]    Admit date: 8/21/21  Admit time:   1:57 PM  Discharge Date: 8/26/2021  1:28 PM     Admitting Physician: Suzie Ching MD  Attending P Denies any fevers or chills, headaches or blurred vision. Hospital Course     DKA   - was on insulin drip and DKA resolved - transitioned to subcutaneous insulin.  Yesterday  Had worse AG metabolic acidosis, was transferred to PCU to start insulin dr appropriate         Discharge Medications      START taking these medications      Instructions Prescription details   amLODIPine 10 MG Tabs  Commonly known as: 100 [facility]  Start taking on: August 27, 2021      Take 1 tablet (10 mg total) by mouth daily.    Deborah Lind nightly.    Quantity: 1200 mL  Refills: 0        CONTINUE taking these medications      Instructions Prescription details   Albuterol Sulfate  (90 Base) MCG/ACT Aers      Inhale 2 puffs into the lungs every 6 (six) hours as needed for Wheezing or [address]  [address]  [facility] [address]  [phone]             RASHEL Amezcua In 1 week. Specialties: Nurse Practitioner, ENDOCRINOLOGY  Contact information:  [address] [facility]  [phone]                   [facility]

## 2021-09-01 ENCOUNTER — OFFICE VISIT (OUTPATIENT)
Dept: INTERNAL MEDICINE CLINIC | Facility: CLINIC | Age: 41
End: 2021-09-01
Payer: COMMERCIAL

## 2021-09-01 VITALS
OXYGEN SATURATION: 100 % | DIASTOLIC BLOOD PRESSURE: 74 MMHG | WEIGHT: 172.38 LBS | HEIGHT: 63 IN | SYSTOLIC BLOOD PRESSURE: 112 MMHG | BODY MASS INDEX: 30.54 KG/M2 | HEART RATE: 84 BPM

## 2021-09-01 DIAGNOSIS — E11.10 DIABETIC KETOACIDOSIS WITHOUT COMA ASSOCIATED WITH TYPE 2 DIABETES MELLITUS (HCC): ICD-10-CM

## 2021-09-01 DIAGNOSIS — I10 PRIMARY HYPERTENSION: ICD-10-CM

## 2021-09-01 DIAGNOSIS — R00.0 TACHYCARDIA: ICD-10-CM

## 2021-09-01 DIAGNOSIS — K86.9 PANCREATIC LESION: ICD-10-CM

## 2021-09-01 DIAGNOSIS — R11.11 INTRACTABLE VOMITING WITHOUT NAUSEA, UNSPECIFIED VOMITING TYPE: ICD-10-CM

## 2021-09-01 DIAGNOSIS — Z09 HOSPITAL DISCHARGE FOLLOW-UP: Primary | ICD-10-CM

## 2021-09-01 DIAGNOSIS — K86.2 PANCREATIC CYST: ICD-10-CM

## 2021-09-01 DIAGNOSIS — E04.1 THYROID NODULE: ICD-10-CM

## 2021-09-01 DIAGNOSIS — Z02.89 ENCOUNTER FOR COMPLETION OF FORM WITH PATIENT: ICD-10-CM

## 2021-09-01 PROCEDURE — 99496 TRANSJ CARE MGMT HIGH F2F 7D: CPT | Performed by: INTERNAL MEDICINE

## 2021-09-01 PROCEDURE — 3008F BODY MASS INDEX DOCD: CPT | Performed by: INTERNAL MEDICINE

## 2021-09-01 PROCEDURE — 3078F DIAST BP <80 MM HG: CPT | Performed by: INTERNAL MEDICINE

## 2021-09-01 PROCEDURE — 3074F SYST BP LT 130 MM HG: CPT | Performed by: INTERNAL MEDICINE

## 2021-09-01 RX ORDER — CARVEDILOL 12.5 MG/1
12.5 TABLET ORAL 2 TIMES DAILY WITH MEALS
Qty: 60 TABLET | Refills: 2 | Status: SHIPPED | OUTPATIENT
Start: 2021-09-01 | End: 2021-10-21

## 2021-09-01 RX ORDER — AMLODIPINE BESYLATE 10 MG/1
10 TABLET ORAL DAILY
Qty: 30 TABLET | Refills: 2 | Status: SHIPPED | OUTPATIENT
Start: 2021-09-01 | End: 2021-10-21

## 2021-09-01 RX ORDER — ALPRAZOLAM 0.25 MG/1
0.25 TABLET ORAL 3 TIMES DAILY PRN
Qty: 60 TABLET | Refills: 0 | Status: SHIPPED | OUTPATIENT
Start: 2021-09-01 | End: 2021-12-03

## 2021-09-01 RX ORDER — LISINOPRIL 10 MG/1
10 TABLET ORAL DAILY
Qty: 30 TABLET | Refills: 2 | Status: SHIPPED | OUTPATIENT
Start: 2021-09-01 | End: 2021-09-20

## 2021-09-01 NOTE — PROGRESS NOTES
.mt  HPI:    Tracy Munoz is a 35year old female here today for hospital follow up.    She was discharged from Inpatient hospital[facility]  to Home   Admission Date: 8/21/21   Discharge Date: 8/26/21  Hospital Discharge Diagnoses (since 8/2/ that she might have eaten. Hospital Course      DKA   - was on insulin drip and DKA resolved - transitioned to subcutaneous insulin.  Yesterday  Had worse AG metabolic acidosis, was transferred to PCU to start insulin drip, but BG normalized and repeat E11.9  Insulin Pen Needle (BD PEN NEEDLE KIMBERLY U/F) 32G X 4 MM Does not apply Misc, Use a new pen needle with each injection as directed by your doctor  insulin glargine (LANTUS SOLOSTAR) 100 UNIT/ML Subcutaneous Solution Pen-injector, Inject 28 Units into 08/21/2021. Estimated body mass index is 30.54 kg/m² as calculated from the following:    Height as of this encounter: 5' 3\" (1.6 m). Weight as of this encounter: 172 lb 6.4 oz (78.2 kg).    /74   Pulse 84   Ht 5' 3\" (1.6 m)   Wt 172 lb 6.4 oz ( amLODIPine        10 MG Oral Tab, ALPRAZolam (XANAX) 0.25 MG Oral        Tab    (R11.11) Intractable vomiting without nausea, unspecified vomiting type  Plan: US THYROID ([id number]), GASTRO - INTERNAL, CBC         WITH DIFFERENTIAL WITH PLATELET, COMP META · Continue current medications   · Discussed amlodipine can can cause leg swelling but will hold off on any changed until we repeat blood work / next visit   · Reviewed hospital chart , notes, imaging and blood work   · CT Is showing thyroid nodule: advi

## 2021-09-02 PROBLEM — K86.2 PANCREATIC CYST: Status: ACTIVE | Noted: 2021-09-02

## 2021-09-02 PROBLEM — K86.9 PANCREATIC LESION: Status: ACTIVE | Noted: 2021-09-02

## 2021-09-02 PROBLEM — E04.1 THYROID NODULE: Status: ACTIVE | Noted: 2021-09-02

## 2021-09-02 PROBLEM — I10 PRIMARY HYPERTENSION: Status: ACTIVE | Noted: 2021-09-02

## 2021-09-02 PROBLEM — R00.0 TACHYCARDIA: Status: ACTIVE | Noted: 2021-09-02

## 2021-09-02 PROBLEM — K86.9 PANCREATIC LESION (HCC): Status: ACTIVE | Noted: 2021-09-02

## 2021-09-02 PROBLEM — E11.10 DIABETIC KETOACIDOSIS WITHOUT COMA ASSOCIATED WITH TYPE 2 DIABETES MELLITUS (HCC): Status: ACTIVE | Noted: 2021-09-02

## 2021-09-02 PROBLEM — Z09 HOSPITAL DISCHARGE FOLLOW-UP: Status: ACTIVE | Noted: 2021-09-02

## 2021-09-02 PROBLEM — R11.11 VOMITING WITHOUT NAUSEA: Status: ACTIVE | Noted: 2021-09-02

## 2021-09-02 PROBLEM — K86.2 PANCREATIC CYST (HCC): Status: ACTIVE | Noted: 2021-09-02

## 2021-09-03 ENCOUNTER — HOSPITAL ENCOUNTER (OUTPATIENT)
Dept: ULTRASOUND IMAGING | Age: 41
Discharge: HOME | End: 2021-09-03
Attending: INTERNAL MEDICINE
Payer: COMMERCIAL

## 2021-09-03 ENCOUNTER — NURSE ONLY (OUTPATIENT)
Dept: INTERNAL MEDICINE CLINIC | Facility: CLINIC | Age: 41
End: 2021-09-03
Payer: COMMERCIAL

## 2021-09-03 DIAGNOSIS — E11.10 DIABETIC KETOACIDOSIS WITHOUT COMA ASSOCIATED WITH TYPE 2 DIABETES MELLITUS (HCC): ICD-10-CM

## 2021-09-03 DIAGNOSIS — K86.2 PANCREATIC CYST: ICD-10-CM

## 2021-09-03 DIAGNOSIS — Z09 HOSPITAL DISCHARGE FOLLOW-UP: ICD-10-CM

## 2021-09-03 DIAGNOSIS — I10 ESSENTIAL HYPERTENSION: ICD-10-CM

## 2021-09-03 DIAGNOSIS — R80.9 TYPE 2 DIABETES MELLITUS WITH MICROALBUMINURIA, WITHOUT LONG-TERM CURRENT USE OF INSULIN (HCC): ICD-10-CM

## 2021-09-03 DIAGNOSIS — E11.29 TYPE 2 DIABETES MELLITUS WITH MICROALBUMINURIA, WITHOUT LONG-TERM CURRENT USE OF INSULIN (HCC): ICD-10-CM

## 2021-09-03 DIAGNOSIS — Z02.89 ENCOUNTER FOR COMPLETION OF FORM WITH PATIENT: ICD-10-CM

## 2021-09-03 DIAGNOSIS — K86.9 PANCREATIC LESION: ICD-10-CM

## 2021-09-03 DIAGNOSIS — R00.0 TACHYCARDIA: ICD-10-CM

## 2021-09-03 DIAGNOSIS — E04.1 THYROID NODULE: ICD-10-CM

## 2021-09-03 DIAGNOSIS — R11.11 INTRACTABLE VOMITING WITHOUT NAUSEA, UNSPECIFIED VOMITING TYPE: ICD-10-CM

## 2021-09-03 DIAGNOSIS — I10 PRIMARY HYPERTENSION: ICD-10-CM

## 2021-09-03 LAB
ALBUMIN SERPL-MCNC: 2.6 G/DL (ref 3.4–5)
ALBUMIN/GLOB SERPL: 0.6 {RATIO} (ref 1–2)
ALP LIVER SERPL-CCNC: 66 U/L
ALT SERPL-CCNC: 23 U/L
ANION GAP SERPL CALC-SCNC: 4 MMOL/L (ref 0–18)
AST SERPL-CCNC: 19 U/L (ref 15–37)
BASOPHILS # BLD AUTO: 0.04 X10(3) UL (ref 0–0.2)
BASOPHILS NFR BLD AUTO: 0.5 %
BILIRUB SERPL-MCNC: 0.3 MG/DL (ref 0.1–2)
BUN BLD-MCNC: 14 MG/DL (ref 7–18)
BUN/CREAT SERPL: 25 (ref 10–20)
CALCIUM BLD-MCNC: 8.9 MG/DL (ref 8.5–10.1)
CHLORIDE SERPL-SCNC: 105 MMOL/L (ref 98–112)
CHOLEST SMN-MCNC: 243 MG/DL (ref ?–200)
CO2 SERPL-SCNC: 30 MMOL/L (ref 21–32)
CREAT BLD-MCNC: 0.56 MG/DL
DEPRECATED RDW RBC AUTO: 49.2 FL (ref 35.1–46.3)
EOSINOPHIL # BLD AUTO: 0.09 X10(3) UL (ref 0–0.7)
EOSINOPHIL NFR BLD AUTO: 1.1 %
ERYTHROCYTE [DISTWIDTH] IN BLOOD BY AUTOMATED COUNT: 13.3 % (ref 11–15)
EST. AVERAGE GLUCOSE BLD GHB EST-MCNC: 315 MG/DL (ref 68–126)
GLOBULIN PLAS-MCNC: 4.4 G/DL (ref 2.8–4.4)
GLUCOSE BLD-MCNC: 205 MG/DL (ref 70–99)
HBA1C MFR BLD HPLC: 12.6 % (ref ?–5.7)
HCT VFR BLD AUTO: 36 %
HDLC SERPL-MCNC: 61 MG/DL (ref 40–59)
HGB BLD-MCNC: 11.4 G/DL
IMM GRANULOCYTES # BLD AUTO: 0.03 X10(3) UL (ref 0–1)
IMM GRANULOCYTES NFR BLD: 0.4 %
LDLC SERPL CALC-MCNC: 151 MG/DL (ref ?–100)
LYMPHOCYTES # BLD AUTO: 1.83 X10(3) UL (ref 1–4)
LYMPHOCYTES NFR BLD AUTO: 22.5 %
M PROTEIN MFR SERPL ELPH: 7 G/DL (ref 6.4–8.2)
MCH RBC QN AUTO: 31.7 PG (ref 26–34)
MCHC RBC AUTO-ENTMCNC: 31.7 G/DL (ref 31–37)
MCV RBC AUTO: 100 FL
MONOCYTES # BLD AUTO: 0.63 X10(3) UL (ref 0.1–1)
MONOCYTES NFR BLD AUTO: 7.7 %
NEUTROPHILS # BLD AUTO: 5.51 X10 (3) UL (ref 1.5–7.7)
NEUTROPHILS # BLD AUTO: 5.51 X10(3) UL (ref 1.5–7.7)
NEUTROPHILS NFR BLD AUTO: 67.8 %
NONHDLC SERPL-MCNC: 182 MG/DL (ref ?–130)
OSMOLALITY SERPL CALC.SUM OF ELEC: 294 MOSM/KG (ref 275–295)
PATIENT FASTING Y/N/NP: YES
PATIENT FASTING Y/N/NP: YES
PLATELET # BLD AUTO: 300 10(3)UL (ref 150–450)
POTASSIUM SERPL-SCNC: 4.6 MMOL/L (ref 3.5–5.1)
RBC # BLD AUTO: 3.6 X10(6)UL
SODIUM SERPL-SCNC: 139 MMOL/L (ref 136–145)
TRIGL SERPL-MCNC: 172 MG/DL (ref 30–149)
TSI SER-ACNC: 3.6 MIU/ML (ref 0.36–3.74)
VIT D+METAB SERPL-MCNC: 11.2 NG/ML (ref 30–100)
VLDLC SERPL CALC-MCNC: 33 MG/DL (ref 0–30)
WBC # BLD AUTO: 8.1 X10(3) UL (ref 4–11)

## 2021-09-03 PROCEDURE — 76536 US EXAM OF HEAD AND NECK: CPT | Performed by: INTERNAL MEDICINE

## 2021-09-03 PROCEDURE — 82306 VITAMIN D 25 HYDROXY: CPT | Performed by: INTERNAL MEDICINE

## 2021-09-03 PROCEDURE — 3046F HEMOGLOBIN A1C LEVEL >9.0%: CPT | Performed by: NURSE PRACTITIONER

## 2021-09-03 PROCEDURE — 80061 LIPID PANEL: CPT | Performed by: INTERNAL MEDICINE

## 2021-09-03 PROCEDURE — 83036 HEMOGLOBIN GLYCOSYLATED A1C: CPT | Performed by: INTERNAL MEDICINE

## 2021-09-03 PROCEDURE — 80050 GENERAL HEALTH PANEL: CPT | Performed by: INTERNAL MEDICINE

## 2021-09-06 DIAGNOSIS — D64.9 ANEMIA, UNSPECIFIED TYPE: Primary | ICD-10-CM

## 2021-09-06 RX ORDER — ERGOCALCIFEROL 1.25 MG/1
50000 CAPSULE ORAL WEEKLY
Qty: 12 CAPSULE | Refills: 1 | Status: SHIPPED | OUTPATIENT
Start: 2021-09-06 | End: 2021-11-23

## 2021-09-06 RX ORDER — ATORVASTATIN CALCIUM 20 MG/1
20 TABLET, FILM COATED ORAL NIGHTLY
Qty: 90 TABLET | Refills: 1 | Status: SHIPPED | OUTPATIENT
Start: 2021-09-06 | End: 2021-12-05

## 2021-09-07 DIAGNOSIS — E04.1 THYROID NODULE: Primary | ICD-10-CM

## 2021-09-09 ENCOUNTER — PATIENT MESSAGE (OUTPATIENT)
Dept: INTERNAL MEDICINE CLINIC | Facility: CLINIC | Age: 41
End: 2021-09-09

## 2021-09-09 ENCOUNTER — OFFICE VISIT (OUTPATIENT)
Dept: ENDOCRINOLOGY CLINIC | Facility: CLINIC | Age: 41
End: 2021-09-09
Payer: COMMERCIAL

## 2021-09-09 VITALS
BODY MASS INDEX: 31.01 KG/M2 | WEIGHT: 175 LBS | SYSTOLIC BLOOD PRESSURE: 125 MMHG | HEART RATE: 86 BPM | DIASTOLIC BLOOD PRESSURE: 81 MMHG | HEIGHT: 63 IN

## 2021-09-09 DIAGNOSIS — E11.29 TYPE 2 DIABETES MELLITUS WITH MICROALBUMINURIA, WITHOUT LONG-TERM CURRENT USE OF INSULIN (HCC): Primary | ICD-10-CM

## 2021-09-09 DIAGNOSIS — R80.9 TYPE 2 DIABETES MELLITUS WITH MICROALBUMINURIA, WITHOUT LONG-TERM CURRENT USE OF INSULIN (HCC): Primary | ICD-10-CM

## 2021-09-09 DIAGNOSIS — I10 PRIMARY HYPERTENSION: ICD-10-CM

## 2021-09-09 PROCEDURE — 3079F DIAST BP 80-89 MM HG: CPT | Performed by: NURSE PRACTITIONER

## 2021-09-09 PROCEDURE — 99215 OFFICE O/P EST HI 40 MIN: CPT | Performed by: NURSE PRACTITIONER

## 2021-09-09 PROCEDURE — 3008F BODY MASS INDEX DOCD: CPT | Performed by: NURSE PRACTITIONER

## 2021-09-09 PROCEDURE — 3074F SYST BP LT 130 MM HG: CPT | Performed by: NURSE PRACTITIONER

## 2021-09-09 RX ORDER — HYDROCHLOROTHIAZIDE 12.5 MG/1
12.5 TABLET ORAL DAILY
Qty: 30 TABLET | Refills: 0 | Status: SHIPPED | OUTPATIENT
Start: 2021-09-09 | End: 2021-10-21

## 2021-09-09 RX ORDER — SEMAGLUTIDE 1.34 MG/ML
0.25 INJECTION, SOLUTION SUBCUTANEOUS WEEKLY
Qty: 4.5 ML | Refills: 0 | Status: SHIPPED | OUTPATIENT
Start: 2021-09-09 | End: 2021-10-12

## 2021-09-09 RX ORDER — GLIPIZIDE 5 MG/1
TABLET ORAL
Qty: 180 TABLET | Refills: 0 | Status: SHIPPED | OUTPATIENT
Start: 2021-09-09 | End: 2021-12-08

## 2021-09-09 NOTE — PATIENT INSTRUCTIONS
A1C: 12.6% on 9/3/2021  Blood glucose: 128 in clinic today    Medications:   -continue with Levemir 28 units once daily   -stop Novolog   -start glipizide 5mg with breakfast     5mg with dinner   -start ozempic 0.25mg once weekly   Common side effects:

## 2021-09-09 NOTE — PROGRESS NOTES
Name: Pilo Rodriguez  Date: 9/9/2021    CHIEF COMPLAINT   Patient presents with:  Consult: Pt is here to discuss DM.  Poc     HISTORY OF PRESENT ILLNESS   Pilo Rodriguez is a 35year old female who presents for follow up on diabetes management s weight change, fever, fatigue, cold/heat intolerance  Eyes: Negative for:  Visual changes, proptosis, blurring  ENT: Negative for:  dysphagia, neck swelling, dysphonia  Respiratory: Negative for: hemoptysis, shortness of breath, cough, or dyspnea.   Cardiov regular tablets if ODT not covered, Disp: 30 tablet, Rfl: 1  •  Blood Glucose Monitoring Suppl ([address]) w/Device Does not apply Kit, Test blood glucose twice daily (before breakfast and before dinner).  Diagnosis: diabetes mellitus 2, E1 Partner      Spouse name: Not on file      Number of children: Not on file      Years of education: Not on file      Highest education level: Not on file    Tobacco Use      Smoking status: Current Some Day Smoker        Packs/day: 0.50        Types: Cigar significance of A1c, and common complications such as: microvascular, macrovascular and diabetic ketoacidosis.  Patient verbalizes understanding of the importance of glycemic control and the goals of therapy.   -Discussed with patient glucose targets ranges once daily   -continue with carvedilol 12.5mg twice daily   -discussed to follow up with cardiology soon as advised by Dr. Mickey Helms (PCP)    3. Lipids Management   -LDL: 151 and Tri on 9/3/2021  -on atorvastatin 20mg at bedtime      RTC in 6 weeks   P

## 2021-09-09 NOTE — TELEPHONE ENCOUNTER
Pt is calling to follow up on this request. Pt was informed that Dr Shanel Michael would be back in on Monday.

## 2021-09-09 NOTE — PROGRESS NOTES
Several attempts made to reach the patient with no return call. Patient completed HFU on 9/1/2021. Closing encounter.

## 2021-09-11 ENCOUNTER — PATIENT MESSAGE (OUTPATIENT)
Dept: ENDOCRINOLOGY CLINIC | Facility: CLINIC | Age: 41
End: 2021-09-11

## 2021-09-13 RX ORDER — FLASH GLUCOSE SCANNING READER
1 EACH MISCELLANEOUS DAILY
Qty: 1 EACH | Refills: 0 | Status: SHIPPED | OUTPATIENT
Start: 2021-09-13

## 2021-09-13 RX ORDER — FLASH GLUCOSE SENSOR
1 KIT MISCELLANEOUS
Qty: 2 EACH | Refills: 2 | Status: SHIPPED | OUTPATIENT
Start: 2021-09-13 | End: 2021-12-03

## 2021-09-13 NOTE — TELEPHONE ENCOUNTER
Patient called doubling checking that Dr. Randy Gan got her message and that she'll call her back today as she wants to extend her go back to work time.

## 2021-09-13 NOTE — TELEPHONE ENCOUNTER
From: Megan Whiteside  To: RASHEL Mcarthur  Sent: 9/11/2021 8:42 PM CDT  Subject: Other    Good evening,   I had a question about the [facility] we spoke about, I'm very interested in getting the device and I know we discussed putting in a reque

## 2021-09-14 ENCOUNTER — TELEPHONE (OUTPATIENT)
Dept: ENDOCRINOLOGY CLINIC | Facility: CLINIC | Age: 41
End: 2021-09-14

## 2021-09-14 NOTE — TELEPHONE ENCOUNTER
Pt states her PCP wants pt to refer to [facility] on when she should return back to work, pt is asking to talk to [facility].

## 2021-09-15 ENCOUNTER — PATIENT MESSAGE (OUTPATIENT)
Dept: ENDOCRINOLOGY CLINIC | Facility: CLINIC | Age: 41
End: 2021-09-15

## 2021-09-15 NOTE — TELEPHONE ENCOUNTER
Please obtain specific BG readings for review so we can further adjust medications as needed to stabilize BG readings. Ok to write letter for time off and patient to return to work on Monday 9/20. Thank you!

## 2021-09-15 NOTE — TELEPHONE ENCOUNTER
From: Marifer Miranda  To: RASHEL Candelario  Sent: 9/15/2021 2:27 PM CDT  Subject: Call back request     Good afternoon  I am currently out of work on disability and my primary care provider referred me to speak to you regarding my return to work date

## 2021-09-15 NOTE — TELEPHONE ENCOUNTER
Wendy Cody, spoke with patient, she's requesting a few more days off work due to fluctuating blood sugar levels. 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 Also concerned about driving to work.  She can be stuck o

## 2021-09-16 NOTE — TELEPHONE ENCOUNTER
Efrem Brewer,    Patient stated she was not able to log sugars because she doesn't have a log sheet, however some sugars provided below:    9/16: Blood sugar before breakfast 100.    9/15: Blood sugar before breakfast 117--- 8:30 am: egg sandwich multi-grain b

## 2021-09-16 NOTE — TELEPHONE ENCOUNTER
[id number] [address] noted. Seems that her BG readings overall are stable. Ok to still write letter for time off work until 9/20. She needs to test her BG readings 2x daily- fasting and before dinner or more often if she is not feeling well.      Hypoglycemia likely o

## 2021-09-16 NOTE — TELEPHONE ENCOUNTER
Spoke to patient regarding Tiffanie's note below. Patient verbalized understanding, and will call on Monday 9/20 with a blood sugar update.

## 2021-09-17 ENCOUNTER — LAB ENCOUNTER (OUTPATIENT)
Dept: LAB | Age: 41
End: 2021-09-17
Attending: INTERNAL MEDICINE
Payer: COMMERCIAL

## 2021-09-17 DIAGNOSIS — E04.1 THYROID NODULE: ICD-10-CM

## 2021-09-17 RX ORDER — HYDROCHLOROTHIAZIDE 12.5 MG/1
TABLET ORAL
Qty: 90 TABLET | Refills: 0 | OUTPATIENT
Start: 2021-09-17

## 2021-09-18 LAB — SARS-COV-2 RNA RESP QL NAA+PROBE: NOT DETECTED

## 2021-09-18 RX ORDER — HYDROCHLOROTHIAZIDE 12.5 MG/1
TABLET ORAL
Qty: 90 TABLET | Refills: 0 | OUTPATIENT
Start: 2021-09-18

## 2021-09-20 ENCOUNTER — HOSPITAL ENCOUNTER (OUTPATIENT)
Dept: ULTRASOUND IMAGING | Facility: HOSPITAL | Age: 41
Discharge: HOME | End: 2021-09-20
Attending: INTERNAL MEDICINE
Payer: COMMERCIAL

## 2021-09-20 ENCOUNTER — OFFICE VISIT (OUTPATIENT)
Dept: FAMILY MEDICINE CLINIC | Facility: CLINIC | Age: 41
End: 2021-09-20
Payer: COMMERCIAL

## 2021-09-20 DIAGNOSIS — E04.1 THYROID NODULE: ICD-10-CM

## 2021-09-20 DIAGNOSIS — K86.2 PANCREATIC CYST: ICD-10-CM

## 2021-09-20 DIAGNOSIS — Z02.89 ENCOUNTER FOR COMPLETION OF FORM WITH PATIENT: ICD-10-CM

## 2021-09-20 DIAGNOSIS — Z09 HOSPITAL DISCHARGE FOLLOW-UP: ICD-10-CM

## 2021-09-20 DIAGNOSIS — R00.0 TACHYCARDIA: ICD-10-CM

## 2021-09-20 DIAGNOSIS — J20.9 ACUTE BRONCHITIS, UNSPECIFIED ORGANISM: Primary | ICD-10-CM

## 2021-09-20 DIAGNOSIS — E11.10 DIABETIC KETOACIDOSIS WITHOUT COMA ASSOCIATED WITH TYPE 2 DIABETES MELLITUS (HCC): ICD-10-CM

## 2021-09-20 DIAGNOSIS — I10 PRIMARY HYPERTENSION: ICD-10-CM

## 2021-09-20 DIAGNOSIS — R11.11 INTRACTABLE VOMITING WITHOUT NAUSEA, UNSPECIFIED VOMITING TYPE: ICD-10-CM

## 2021-09-20 DIAGNOSIS — K86.9 PANCREATIC LESION: ICD-10-CM

## 2021-09-20 PROCEDURE — 99213 OFFICE O/P EST LOW 20 MIN: CPT | Performed by: PHYSICIAN ASSISTANT

## 2021-09-20 RX ORDER — BENZONATATE 200 MG/1
200 CAPSULE ORAL 3 TIMES DAILY PRN
Qty: 21 CAPSULE | Refills: 0 | Status: SHIPPED | OUTPATIENT
Start: 2021-09-20 | End: 2021-09-27

## 2021-09-20 RX ORDER — ALBUTEROL SULFATE 90 UG/1
AEROSOL, METERED RESPIRATORY (INHALATION)
Qty: 1 EACH | Refills: 0 | Status: SHIPPED | OUTPATIENT
Start: 2021-09-20 | End: 2021-12-03 | Stop reason: ALTCHOICE

## 2021-09-20 NOTE — PATIENT INSTRUCTIONS
1. Albuterol  2. Tessalon  3. Follow up with PCP  4. If worsening symptoms seek treatment  5. COVID pending    Acute Bronchitis  Your healthcare provider has told you that you have acute bronchitis.  Bronchitis is infection or inflammation of the airways directed. Talk to your healthcare provider before taking any over-the-counter medicines (OTC). Some OTC medicines help relieve inflammation in your bronchial tubes. They can also thin mucus. This makes it easier to cough up.  Your healthcare provider may pr of 100.4°F ( 38.0°C) or higher, or as directed by your healthcare provider  · Symptoms that get worse, or new symptoms  · Breathing not getting better with treatments  · Symptoms that don’t start to get better in 1 week  Susan last reviewed this educati also can lessen stress on the public health system, especially when new infections are rapidly rising. Your local public health authorities make the final decisions about how long quarantine should last, based on local conditions and needs.  Follow the rec specific room and away from other people in your home. Also, you should use a separate bathroom, if available. If you need to be around other people in or outside of the home, wear a facemask.    9. Avoid sharing personal items with other people in your audrey for a total of 10 days after your symptoms started, and at least 24 hours after your fever is gone and symptoms are getting better, whichever is longer. Patients with pending COVID-19 test results should follow all care and home isolation instructions.   Celso Hodgkin be eligible to donate. If you’re instructed by Edwin Thomas that a repeat test is required, please contact the [facility] COVID-19 Nurse Triage Line at [phone].     Additional Information      You can also get more information at the following websites Post-COVID condition? The best way to prevent the long-term symptoms of COVID-19 is by preventing COVID-19.     Important ways to slow the spread of COVID 19 are:   • Get the COVID 19 vaccine and encourage others to get the COVID 19 vaccine   • Wear a mask

## 2021-09-20 NOTE — PROGRESS NOTES
CHIEF COMPLAINT:   Patient presents with:  Cough: Cough and runny nose - Entered by patient        HPI:   Hiren Anderson is a 35year old female who presents for cough for 6 days. Cough is sometimes productive.   The patient reports getting into coughing by mouth daily. 30 tablet 2   • amLODIPine 10 MG Oral Tab Take 1 tablet (10 mg total) by mouth daily. 30 tablet 2   • ALPRAZolam (XANAX) 0.25 MG Oral Tab Take 1 tablet (0.25 mg total) by mouth 3 (three) times daily as needed for Anxiety.  60 tablet 0   • conley your doctor 100 each 6   • HUMALOG KWIKPEN 100 UNIT/ML Subcutaneous Solution Pen-injector ADMINISTER 12 TO 17 UNITS UNDER THE SKIN THREE TIMES DAILY WITH MEALS (Patient not taking: Reported on 9/20/2021) 51 mL 0   • Albuterol Sulfate  (90 Base) MCG/ PLAN:   Rita Pena is a 35year old female who presents with:     ASSESSMENT:  Acute bronchitis, unspecified organism  (primary encounter diagnosis)    PLAN:  Meds as below. Comfort Care as listed in Patient Instructions.       Meds & Refills for th airways may also become infected with bacteria. This is known as a secondary infection.   Symptoms of acute bronchitis  Symptoms can include:  · Coughing with mucus  · Wheezing  · Feeling short of breath  · Chest pain  · Fever  Diagnosing acute bronchitis most important step you can take to treat bronchitis. If you need help stopping smoking, talk with your healthcare provider. · Stay away from secondhand smoke and other irritants.  Try to stay away from smoke, chemicals, fumes, and dust. Don’t let anyone s the below recommendations. If you test positive for COVID-19, you should notify your family and friends with whom you have had close contact recently (starting 2 days before you first had symptoms). What counts as close contact?     * You were within 6 other public places. If you must go out, avoid using any kind of public transportation, ridesharing, or taxis. 2. Monitor your symptoms carefully. If your symptoms get worse, call your healthcare provider immediately. 3. Get rest and stay hydrated.    4. precautions following the below guidelines:  • At least 24 hours have passed since recovery defined as resolution of fever without the use of fever-reducing medications; and  · Improvement in respiratory symptoms (e.g., cough, shortness of breath); and  · virus. How can I donate convalescent plasma? The process for donating plasma is very similar to donating blood.  Jesus Moore (a large blood research institute in [address] [facility] and one of [facility]s blood product suppliers) is coordinating plasma darryl one or more of the following symptoms:    Persistent severe fatigue Brain fog or trouble concentrating   Headaches Sleeping difficulties   Anxiety or depression Loss of taste or smell   Chest pain  Palpitations Light headedness  Muscle Pain   Increased Hea

## 2021-09-21 LAB — SARS-COV-2 RNA RESP QL NAA+PROBE: NOT DETECTED

## 2021-09-21 NOTE — TELEPHONE ENCOUNTER
Patient comment: I was told by [facility] (endocrinologist) to increase to 20mg a day, it is my after care summary from 9/09  can you please adjust my and send refill, I'm all out of this medication

## 2021-09-22 RX ORDER — LISINOPRIL 10 MG/1
10 TABLET ORAL DAILY
Qty: 30 TABLET | Refills: 2 | Status: SHIPPED | OUTPATIENT
Start: 2021-09-22 | End: 2021-10-21

## 2021-09-30 ENCOUNTER — OFFICE VISIT (OUTPATIENT)
Dept: OBGYN CLINIC | Facility: CLINIC | Age: 41
End: 2021-09-30
Payer: COMMERCIAL

## 2021-09-30 VITALS — DIASTOLIC BLOOD PRESSURE: 80 MMHG | BODY MASS INDEX: 30 KG/M2 | WEIGHT: 169 LBS | SYSTOLIC BLOOD PRESSURE: 130 MMHG

## 2021-09-30 DIAGNOSIS — Z30.49 ENCOUNTER FOR SURVEILLANCE OF OTHER CONTRACEPTIVE: Primary | ICD-10-CM

## 2021-09-30 PROCEDURE — 3075F SYST BP GE 130 - 139MM HG: CPT | Performed by: OBSTETRICS & GYNECOLOGY

## 2021-09-30 PROCEDURE — 3079F DIAST BP 80-89 MM HG: CPT | Performed by: OBSTETRICS & GYNECOLOGY

## 2021-09-30 PROCEDURE — 99213 OFFICE O/P EST LOW 20 MIN: CPT | Performed by: OBSTETRICS & GYNECOLOGY

## 2021-09-30 NOTE — PROGRESS NOTES
Subjective:   Patient ID: Marifer Miranda is a 35year old female. Patient here to discuss contraception. Currently has Mirena IUD that was placed in a Clinic in [facility] almost five years ago. IUD is due to be removed in December.   IUD strings have morning before breakfast AND 1 tablet (5 mg total) daily with dinner. 180 tablet 0   • hydrochlorothiazide 12.5 MG Oral Tab Take 1 tablet (12.5 mg total) by mouth daily.  30 tablet 0   • ergocalciferol 1.25 MG (65264 UT) Oral Cap Take 1 capsule (50,000 Unit diabetes mellitus 2, E11.9 (Patient not taking: Reported on 9/30/2021) 50 strip 6   • OneTouch Delica Lancets Fine Does not apply Misc Test blood glucose twice daily (before breakfast and before dinner).  Diagnosis: diabetes mellitus 2, E11.9 (Patient not t questions answered. To call office for next appointment. If Prefers Elective Sterilization then she should see Dr Maria L Hogan. Patient understands that repeat pap needed. No orders of the defined types were placed in this encounter.       Meds This Visit:  R

## 2021-10-05 ENCOUNTER — TELEPHONE (OUTPATIENT)
Dept: ENDOCRINOLOGY CLINIC | Facility: CLINIC | Age: 41
End: 2021-10-05

## 2021-10-05 ENCOUNTER — PATIENT MESSAGE (OUTPATIENT)
Dept: ENDOCRINOLOGY CLINIC | Facility: CLINIC | Age: 41
End: 2021-10-05

## 2021-10-05 RX ORDER — BLOOD-GLUCOSE METER
1 EACH MISCELLANEOUS DAILY
Qty: 1 KIT | Refills: 0 | Status: SHIPPED | OUTPATIENT
Start: 2021-10-05

## 2021-10-05 RX ORDER — LANCETS
EACH MISCELLANEOUS
Qty: 200 EACH | Refills: 0 | Status: SHIPPED | OUTPATIENT
Start: 2021-10-05

## 2021-10-05 RX ORDER — BLOOD SUGAR DIAGNOSTIC
STRIP MISCELLANEOUS
Qty: 200 STRIP | Refills: 0 | Status: SHIPPED | OUTPATIENT
Start: 2021-10-05

## 2021-10-05 NOTE — TELEPHONE ENCOUNTER
From: Carol Gutierrez  To: RASHEL Healy  Sent: 10/5/2021 11:42 AM CDT  Subject: Question about Yomi Awe Morning Marvene Dess,  On Sunday Sept 12th 2021 I started on the Ozempic (0.25mg) once a week, this past Arnulfo 10/03/2021 was my 4th week us

## 2021-10-05 NOTE — TELEPHONE ENCOUNTER
Fax received to hospitalist office from [facility], stating One Touch Verio Strips were not covered by patient insurance and requesting a prior Auth.      Cover my meds key: [id number]    Pt is no longer under the care of the hospitalist team since 08

## 2021-10-05 NOTE — TELEPHONE ENCOUNTER
Davin BOLDEN -- please see message below. RN sent a message asking to provide BG update. LOV note didn't indicate to increase dose to 0.5 mg weekly. Thank you.

## 2021-10-05 NOTE — TELEPHONE ENCOUNTER
Insurance: [phone]  ID: [id number]    [address] is part of Plan exclusion. However, Contour is preferred under tier 2.  RN called patient and informed her of this and scripts were sent to pharmacy.

## 2021-10-08 RX ORDER — INSULIN GLARGINE 100 [IU]/ML
28 INJECTION, SOLUTION SUBCUTANEOUS EVERY MORNING
Qty: 30 ML | Refills: 0 | Status: SHIPPED | OUTPATIENT
Start: 2021-10-08 | End: 2022-01-17

## 2021-10-08 NOTE — TELEPHONE ENCOUNTER
Pt called in to follow up on the refill request she needs the medication as soon as possible.  Please follow up

## 2021-10-08 NOTE — TELEPHONE ENCOUNTER
LOV: 9/9/21 with APN    Future Appointments   Date Time Provider Yanelis Márquez   10/21/2021  2:30 PM [facility] Blood[facility]

## 2021-10-13 RX ORDER — SEMAGLUTIDE 1.34 MG/ML
0.25 INJECTION, SOLUTION SUBCUTANEOUS WEEKLY
Qty: 4.5 ML | Refills: 0 | Status: SHIPPED | OUTPATIENT
Start: 2021-10-13 | End: 2021-12-01

## 2021-10-14 ENCOUNTER — OFFICE VISIT (OUTPATIENT)
Dept: OBGYN CLINIC | Facility: CLINIC | Age: 41
End: 2021-10-14
Payer: COMMERCIAL

## 2021-10-14 VITALS — DIASTOLIC BLOOD PRESSURE: 72 MMHG | BODY MASS INDEX: 30 KG/M2 | WEIGHT: 168 LBS | SYSTOLIC BLOOD PRESSURE: 124 MMHG

## 2021-10-14 DIAGNOSIS — Z30.09 CONSULTATION FOR FEMALE STERILIZATION: Primary | ICD-10-CM

## 2021-10-14 DIAGNOSIS — R87.610 ASCUS WITH POSITIVE HIGH RISK HPV CERVICAL: ICD-10-CM

## 2021-10-14 DIAGNOSIS — T83.32XA INTRAUTERINE CONTRACEPTIVE DEVICE THREADS LOST, INITIAL ENCOUNTER: ICD-10-CM

## 2021-10-14 DIAGNOSIS — R87.810 ASCUS WITH POSITIVE HIGH RISK HPV CERVICAL: ICD-10-CM

## 2021-10-14 DIAGNOSIS — Z11.3 SCREENING EXAMINATION FOR STD (SEXUALLY TRANSMITTED DISEASE): ICD-10-CM

## 2021-10-14 PROCEDURE — 3074F SYST BP LT 130 MM HG: CPT | Performed by: OBSTETRICS & GYNECOLOGY

## 2021-10-14 PROCEDURE — 3078F DIAST BP <80 MM HG: CPT | Performed by: OBSTETRICS & GYNECOLOGY

## 2021-10-14 PROCEDURE — 99214 OFFICE O/P EST MOD 30 MIN: CPT | Performed by: OBSTETRICS & GYNECOLOGY

## 2021-10-15 NOTE — PROGRESS NOTES
[facility], [facility]  Obstetrics and Gynecology  Focused Gynecology Problem Exam  Rosanne Ovalle MD    Amie Raman is a 35year old female presenting for Contraception (discuss sterilization)  .     HPI:   Patient presents with:  Contraception: discuss st mouth every morning before breakfast AND 1 tablet (5 mg total) daily with dinner. 180 tablet 0   • hydrochlorothiazide 12.5 MG Oral Tab Take 1 tablet (12.5 mg total) by mouth daily.  30 tablet 0   • ergocalciferol 1.25 MG (98740 UT) Oral Cap Take 1 capsule tablet 2   • sucralfate 1 GM/10ML Oral Suspension Take 10 mL (1 g total) by mouth 4 (four) times daily before meals and nightly.  (Patient not taking: Reported on 10/14/2021) 1200 mL 0     Allergies:  No Known Allergies  HISTORY:     OB History    Pa Cannabis        Comment: three times a week      Sexual activity: Not on file    Other Topics      Concerns:         Service: Not Asked        Blood Transfusions: Not Asked        Caffeine Concern: Yes          2 cups coffee daily        Occupation BASES: Please see the separately dictated report for the concurrently performed PE protocol chest CT for further details. LIVER: The liver is enlarged, measuring 18.3 cm.  Isolated low density adjacent to the falciform ligament is characteristic for focal Impression   CONCLUSION:   1. Nonspecific diffuse colonic wall thickening may be due to underdistention or reflect underlying colitis.       2.  No evidence of loculated, drainable fluid collection to suggest abscess formation.       3. Small hiatal herni ASCUS pap/HPV positive a year ago.      (Z30.09) Consultation for female sterilization  (primary encounter diagnosis)    (T83.32XA) Intrauterine contraceptive device threads lost, initial encounter    (R87.610,  R87.810) ASCUS with positive high risk HPV c ordered in this encounter     IMAGING/ REFERRALS:    None     Sabrina Choudhury MD, MD  10/15/2021  6:26 AM

## 2021-10-16 ENCOUNTER — HOSPITAL ENCOUNTER (OUTPATIENT)
Dept: ENDOCRINOLOGY | Facility: HOSPITAL | Age: 41
Discharge: HOME | End: 2021-10-16
Attending: NURSE PRACTITIONER
Payer: COMMERCIAL

## 2021-10-16 VITALS — WEIGHT: 169 LBS | BODY MASS INDEX: 30 KG/M2

## 2021-10-16 DIAGNOSIS — E11.65 TYPE 2 DIABETES MELLITUS WITH HYPERGLYCEMIA, WITH LONG-TERM CURRENT USE OF INSULIN (HCC): Primary | ICD-10-CM

## 2021-10-16 DIAGNOSIS — R80.9 TYPE 2 DIABETES MELLITUS WITH MICROALBUMINURIA, WITHOUT LONG-TERM CURRENT USE OF INSULIN (HCC): Primary | ICD-10-CM

## 2021-10-16 DIAGNOSIS — Z79.4 TYPE 2 DIABETES MELLITUS WITH HYPERGLYCEMIA, WITH LONG-TERM CURRENT USE OF INSULIN (HCC): Primary | ICD-10-CM

## 2021-10-16 DIAGNOSIS — E11.29 TYPE 2 DIABETES MELLITUS WITH MICROALBUMINURIA, WITHOUT LONG-TERM CURRENT USE OF INSULIN (HCC): Primary | ICD-10-CM

## 2021-10-16 NOTE — PROGRESS NOTES
Celso Huerta  : 1980 attended individual initial assessment for Diabetes Education: CGM education and placement     Date: 10/16/2021   Start time: 1:45 pm End time: 2:45 pm    HgbA1C (%)   Date Value   2021 12.6 (H)        Assessment: 43 classes starting Nov 3      Written materials provided for all areas covered. Patient verbalized understanding and has no further questions at this time.     Fani Mcpherson, RN,MSN

## 2021-10-18 ENCOUNTER — TELEPHONE (OUTPATIENT)
Dept: OBGYN CLINIC | Facility: CLINIC | Age: 41
End: 2021-10-18

## 2021-10-18 DIAGNOSIS — T83.32XA INTRAUTERINE CONTRACEPTIVE DEVICE THREADS LOST, INITIAL ENCOUNTER: ICD-10-CM

## 2021-10-18 DIAGNOSIS — N87.1 MODERATE DYSPLASIA OF CERVIX (CIN II): ICD-10-CM

## 2021-10-18 DIAGNOSIS — Z30.09 CONSULTATION FOR FEMALE STERILIZATION: Primary | ICD-10-CM

## 2021-10-19 NOTE — TELEPHONE ENCOUNTER
Please schedule the following surgery:    Procedure: Removal of IUD with possible hysteroscopy followed by laparoscopic bilateral salpingectomy, possible laparotomy.   Assist: (Y/N or none) none  Date: next available  Dx: (Z30.09) Consultation for female [de-identified]

## 2021-10-20 ENCOUNTER — OFFICE VISIT (OUTPATIENT)
Dept: INTERNAL MEDICINE CLINIC | Facility: CLINIC | Age: 41
End: 2021-10-20
Payer: COMMERCIAL

## 2021-10-20 VITALS
DIASTOLIC BLOOD PRESSURE: 82 MMHG | WEIGHT: 168 LBS | HEART RATE: 85 BPM | BODY MASS INDEX: 30 KG/M2 | OXYGEN SATURATION: 99 % | SYSTOLIC BLOOD PRESSURE: 130 MMHG

## 2021-10-20 DIAGNOSIS — I10 PRIMARY HYPERTENSION: ICD-10-CM

## 2021-10-20 DIAGNOSIS — E11.29 TYPE 2 DIABETES MELLITUS WITH MICROALBUMINURIA, WITHOUT LONG-TERM CURRENT USE OF INSULIN (HCC): ICD-10-CM

## 2021-10-20 DIAGNOSIS — K86.9 PANCREATIC LESION: ICD-10-CM

## 2021-10-20 DIAGNOSIS — K86.2 PANCREATIC CYST: ICD-10-CM

## 2021-10-20 DIAGNOSIS — R80.9 TYPE 2 DIABETES MELLITUS WITH MICROALBUMINURIA, WITHOUT LONG-TERM CURRENT USE OF INSULIN (HCC): ICD-10-CM

## 2021-10-20 DIAGNOSIS — F17.200 SMOKING: ICD-10-CM

## 2021-10-20 DIAGNOSIS — R87.619 ABNORMAL CERVICAL PAPANICOLAOU SMEAR, UNSPECIFIED ABNORMAL PAP FINDING: ICD-10-CM

## 2021-10-20 DIAGNOSIS — Z00.00 ANNUAL PHYSICAL EXAM: Primary | ICD-10-CM

## 2021-10-20 PROCEDURE — 83036 HEMOGLOBIN GLYCOSYLATED A1C: CPT | Performed by: INTERNAL MEDICINE

## 2021-10-20 PROCEDURE — 3075F SYST BP GE 130 - 139MM HG: CPT | Performed by: INTERNAL MEDICINE

## 2021-10-20 PROCEDURE — 3079F DIAST BP 80-89 MM HG: CPT | Performed by: INTERNAL MEDICINE

## 2021-10-20 PROCEDURE — 3051F HG A1C>EQUAL 7.0%<8.0%: CPT | Performed by: INTERNAL MEDICINE

## 2021-10-20 PROCEDURE — 99396 PREV VISIT EST AGE 40-64: CPT | Performed by: INTERNAL MEDICINE

## 2021-10-20 RX ORDER — LISINOPRIL AND HYDROCHLOROTHIAZIDE 25; 20 MG/1; MG/1
1 TABLET ORAL DAILY
Qty: 90 TABLET | Refills: 1 | Status: SHIPPED | OUTPATIENT
Start: 2021-10-20 | End: 2022-01-18

## 2021-10-20 RX ORDER — TOBRAMYCIN AND DEXAMETHASONE 3; 1 MG/ML; MG/ML
SUSPENSION/ DROPS OPHTHALMIC
COMMUNITY
Start: 2021-07-03 | End: 2021-12-03

## 2021-10-20 RX ORDER — HYDROCODONE BITARTRATE AND ACETAMINOPHEN 5; 325 MG/1; MG/1
1 TABLET ORAL EVERY 6 HOURS PRN
Qty: 5 TABLET | Refills: 0 | Status: SHIPPED | OUTPATIENT
Start: 2021-10-20 | End: 2021-10-25

## 2021-10-20 NOTE — PATIENT INSTRUCTIONS
-stop taking coreg, lisinopril and hydrochlorothiazide   -switch to lisinopril-hydrochlorothiazide 20-25 mg

## 2021-10-20 NOTE — PROGRESS NOTES
Guillermo Gilmore is a 35year old female.     Chief complaint: annual physical exam       HPI:     Guillermo Gilmore is a 35year old pleasant female who presents for annual physical exam   Dm  Has freestyle cathleen   on  Glipizide   ozempic   0.5 and lantus every 4-6 hours prn wheezing or shortness of breath (Patient not taking: Reported on 10/20/2021) 1 each 0   • Continuous Blood Gluc  (FREESTYLE CHAYA 2 READER) Does not apply Device 1 each by Does not apply route daily.  1 each 0   • Continuous Bloo 50 each 6   • Insulin Pen Needle (BD PEN NEEDLE KIMBERLY U/F) 32G X 4 MM Does not apply Misc Use a new pen needle with each injection as directed by your doctor 100 each 6   • Insulin Pen Needle 32G X 4 MM Does not apply Misc USE TO INJECT INSULIN 4X/ e Pancreatic lesion      REVIEW OF SYSTEMS:   A comprehensive 10 point review of systems was completed.   Pertinent positives and negatives noted in the the HPI            EXAM:   /82   Pulse 85   Wt 168 lb (76.2 kg)   LMP 09/29/2021   SpO2 99%   BMI 29 equal to 1.0cm FNA: annual follow up for 5 years.            Dictated by (CST): Lyndon Camacho MD on 9/03/2021 at 6:35 PM     Finalized by (CST): Lyndon Camacho MD on 9/03/2021 at 6:39 PM          CT ABDOMEN+PELVIS(CONTRAST ONLY)(CPT=74177)    Result Date: 8/ Finalized by (CST): Lalit Marrero MD on 8/22/2021 at 6:37 AM          [facility] JUSTEN 2D+3D DIAGNOSTIC [facility]  BILAT (RIQ=[id number])    Result Date: 8/4/2021  CONCLUSION:  No focal radiographic evidence of malignancy is identified in either breast.  No findings Lisinopril-hydroCHLOROthiazide 20-25 MG Oral Tab; Take 1 tablet by mouth daily. Dispense: 90 tablet; Refill: 1  - HEMOGLOBIN A1C    2.  Primary hypertension  BP at goal   Can switch stop coreg, lisinopril and hydrochlorothiazide and switch to combined haroldo Refill: 1  - HEMOGLOBIN A1C    6.  Smoking  Discussed the importance to stop smoking given all of her medical issues , dm  And abnormal pap   offered medications to help   She would like to hold off and not ready to quit yet     - tobramycin-dexamethasone 0

## 2021-10-21 ENCOUNTER — OFFICE VISIT (OUTPATIENT)
Dept: ENDOCRINOLOGY CLINIC | Facility: CLINIC | Age: 41
End: 2021-10-21
Payer: COMMERCIAL

## 2021-10-21 VITALS
DIASTOLIC BLOOD PRESSURE: 90 MMHG | HEIGHT: 63 IN | WEIGHT: 167 LBS | HEART RATE: 96 BPM | BODY MASS INDEX: 29.59 KG/M2 | SYSTOLIC BLOOD PRESSURE: 147 MMHG

## 2021-10-21 DIAGNOSIS — E11.29 TYPE 2 DIABETES MELLITUS WITH MICROALBUMINURIA, WITHOUT LONG-TERM CURRENT USE OF INSULIN (HCC): Primary | ICD-10-CM

## 2021-10-21 DIAGNOSIS — R80.9 TYPE 2 DIABETES MELLITUS WITH MICROALBUMINURIA, WITHOUT LONG-TERM CURRENT USE OF INSULIN (HCC): Primary | ICD-10-CM

## 2021-10-21 PROCEDURE — 95251 CONT GLUC MNTR ANALYSIS I&R: CPT | Performed by: NURSE PRACTITIONER

## 2021-10-21 PROCEDURE — 3008F BODY MASS INDEX DOCD: CPT | Performed by: NURSE PRACTITIONER

## 2021-10-21 PROCEDURE — 3080F DIAST BP >= 90 MM HG: CPT | Performed by: NURSE PRACTITIONER

## 2021-10-21 PROCEDURE — 3077F SYST BP >= 140 MM HG: CPT | Performed by: NURSE PRACTITIONER

## 2021-10-21 PROCEDURE — 99214 OFFICE O/P EST MOD 30 MIN: CPT | Performed by: NURSE PRACTITIONER

## 2021-10-21 PROCEDURE — 36416 COLLJ CAPILLARY BLOOD SPEC: CPT | Performed by: NURSE PRACTITIONER

## 2021-10-21 NOTE — PATIENT INSTRUCTIONS
A1C: 7.3% today --> significantly improved from 12.6% on 9/3/2021  Blood glucose: 150 in clinic today    Medications:   -stop Levemir   -continue with glipizide 5mg with breakfast      2.5mg with dinner   -continue with ozempic 0.5mg once weekly    Please

## 2021-10-21 NOTE — PROGRESS NOTES
Name: Tracy Munoz  Date: 10/21/2021    CHIEF COMPLAINT   Patient presents with:  Diabetes: Pt is here to follow up on DM. HISTORY OF PRESENT ILLNESS   Tracy Munoz is a 35year old female who presents for follow up on diabetes management.    H last 12 months: yes 10/2021   History of Neuropathy: yes   History of Nephropathy: no     ASSOCIATED COMPLICATIONS:   HTN: yes   Hyperlipidemia: yes  Cardiovascular Disease: no   Peripheral Vascular Disease: no     DIETARY COMPLIANCE:  Good; has been eatin into the skin every morning.  (Patient taking differently: Inject 18 Units into the skin every morning.), Disp: 30 mL, Rfl: 0  •  Glucose Blood (CONTOUR NEXT TEST) In Vitro Strip, Check blood sugar twice a day before meals, Disp: 200 strip, Rfl: 0  •  Blood glucose twice daily (before breakfast and before dinner). Diagnosis: diabetes mellitus 2, E11.9, Disp: 50 strip, Rfl: 6  •  OneTouch Delica Lancets Fine Does not apply Misc, Test blood glucose twice daily (before breakfast and before dinner).  Diagnosis: di MEDICAL HISTORY:   Past Medical History:   Diagnosis Date   • Diabetes ([facility] [address]    [facility]    • Essential hypertension    • Hyperlipidemia        PAST SURGICAL HISTORY:   Past Surgical History:   Procedure Laterality Date   •      • -reviewed MOA of current DM medications   -reviewed target goal BG readings and A1C  -reviewed goal to wean off glipizide.   -instructed to call or send me a  msg with BG readings for review in 1 month.      b) Nephropathy: GFR: 115 on 9/3/2021  and uri

## 2021-10-21 NOTE — TELEPHONE ENCOUNTER
Pt scheduled for video visit to discuss pap results on 11/4/2021. Was unavailable to do 10/28 due to not being in the state of [place]. Colposcopy scheduled for 11/5/2021.     When informing pt of surgery having to be done at least one week after colposcopy/biop

## 2021-10-21 NOTE — TELEPHONE ENCOUNTER
Please notify patient that her colposcopy does not require anesthesia. If she has abnormal findings on the colposcopy she may require further treatment which we would do at the time of her surgery.   That is why I want to do her colposcopy at least a week

## 2021-10-21 NOTE — TELEPHONE ENCOUNTER
Please have patient scheduled for in office colposcopy with biopsies ASAP. The surgery can be scheduled at this time for a date that is at least a week after her colposcopy/biopsies.

## 2021-10-22 ENCOUNTER — TELEPHONE (OUTPATIENT)
Dept: OBGYN CLINIC | Facility: CLINIC | Age: 41
End: 2021-10-22

## 2021-10-22 NOTE — TELEPHONE ENCOUNTER
Pt has colposcopy with Sly Landeros on 11/5 at [facility]  Pt is requesting a reschedule for the next week. Please advise.

## 2021-10-22 NOTE — TELEPHONE ENCOUNTER
Patient name and  verified. Patient requests to change colp appt date for a week later. Offered patient different times and days for appt and patient declined. Patient states to leave appt day and time as is. Will call back if needs appt changed.

## 2021-10-27 NOTE — TELEPHONE ENCOUNTER
Attempted to call pt with a sx date and time. No answer and mailbox is full. Will attempt to call pt again later.

## 2021-11-01 NOTE — TELEPHONE ENCOUNTER
Pt informed of surgery date and time. [facility] sent. Pt aware to call insurance to check if p/a is needed. Pt has video visit and colposcopy scheduled for this week with NYDIA.

## 2021-11-02 ENCOUNTER — ORDER TRANSCRIPTION (OUTPATIENT)
Dept: ADMINISTRATIVE | Facility: HOSPITAL | Age: 41
End: 2021-11-02

## 2021-11-02 DIAGNOSIS — Z01.818 PRE-OP TESTING: Primary | ICD-10-CM

## 2021-11-02 DIAGNOSIS — Z11.59 ENCOUNTER FOR SCREENING FOR OTHER VIRAL DISEASES: ICD-10-CM

## 2021-11-03 ENCOUNTER — HOSPITAL ENCOUNTER (OUTPATIENT)
Dept: ENDOCRINOLOGY | Facility: HOSPITAL | Age: 41
Discharge: HOME | End: 2021-11-03
Attending: NURSE PRACTITIONER
Payer: COMMERCIAL

## 2021-11-03 VITALS — WEIGHT: 163.5 LBS | BODY MASS INDEX: 29 KG/M2

## 2021-11-03 DIAGNOSIS — Z79.4 TYPE 2 DIABETES MELLITUS WITH HYPERGLYCEMIA, WITH LONG-TERM CURRENT USE OF INSULIN (HCC): Primary | ICD-10-CM

## 2021-11-03 DIAGNOSIS — E11.65 TYPE 2 DIABETES MELLITUS WITH HYPERGLYCEMIA, WITH LONG-TERM CURRENT USE OF INSULIN (HCC): Primary | ICD-10-CM

## 2021-11-04 ENCOUNTER — TELEMEDICINE (OUTPATIENT)
Dept: OBGYN CLINIC | Facility: CLINIC | Age: 41
End: 2021-11-04

## 2021-11-04 ENCOUNTER — HOSPITAL ENCOUNTER (OUTPATIENT)
Dept: ENDOCRINOLOGY | Facility: HOSPITAL | Age: 41
Discharge: HOME | End: 2021-11-04
Attending: NURSE PRACTITIONER
Payer: COMMERCIAL

## 2021-11-04 DIAGNOSIS — R87.613 PAPANICOLAOU SMEAR OF CERVIX WITH HIGH GRADE SQUAMOUS INTRAEPITHELIAL LESION (HGSIL): Primary | ICD-10-CM

## 2021-11-04 DIAGNOSIS — A59.9 TRICHOMONAS INFECTION: ICD-10-CM

## 2021-11-04 DIAGNOSIS — E11.65 TYPE 2 DIABETES MELLITUS WITH HYPERGLYCEMIA, WITH LONG-TERM CURRENT USE OF INSULIN (HCC): Primary | ICD-10-CM

## 2021-11-04 DIAGNOSIS — Z79.4 TYPE 2 DIABETES MELLITUS WITH HYPERGLYCEMIA, WITH LONG-TERM CURRENT USE OF INSULIN (HCC): Primary | ICD-10-CM

## 2021-11-04 DIAGNOSIS — R87.810 CERVICAL HIGH RISK HUMAN PAPILLOMAVIRUS (HPV) DNA TEST POSITIVE: ICD-10-CM

## 2021-11-04 PROCEDURE — 95249 CONT GLUC MNTR PT PROV EQP: CPT

## 2021-11-04 PROCEDURE — 99213 OFFICE O/P EST LOW 20 MIN: CPT | Performed by: OBSTETRICS & GYNECOLOGY

## 2021-11-04 RX ORDER — METRONIDAZOLE 500 MG/1
500 TABLET ORAL 2 TIMES DAILY
Qty: 14 TABLET | Refills: 1 | Status: SHIPPED | OUTPATIENT
Start: 2021-11-04 | End: 2021-11-11

## 2021-11-04 NOTE — PROGRESS NOTES
[facility], [facility]  Obstetrics and Gynecology  Video Visit  Focused Gynecology Problem Exam  Dottie Haro MD    Olya Jaeger is a 35year old female presenting for abnormal pap smear.     HPI:   Pt with HGSIL on pap with positive HPV/HPV 16&18 negative (FREESTYLE CHAYA 2 SENSOR) Does not apply Misc 1 each by Does not apply route every 14 (fourteen) days. 2 each 2   • glipiZIDE 5 MG Oral Tab Take 1 tablet (5 mg total) by mouth every morning before breakfast AND 1 tablet (5 mg total) daily with dinner.  (Pa Reported on 10/20/2021) 1 Inhaler 2   • ALPRAZolam 0.25 MG Oral Tab Take 1 tablet (0.25 mg total) by mouth every 6 (six) hours as needed for Sleep.  30 tablet 0     Allergies:    Amlodipine              SWELLING  HISTORY:     OB History    Para Term 2 cups coffee daily        Occupational Exposure: Not Asked        Hobby Hazards: Not Asked        Sleep Concern: Not Asked        Stress Concern: Not Asked        Weight Concern: Not Asked        Special Diet: Not Asked        Back Care: Not Asked (negative HPV 16/18.   Trichomonas on pap, had previous trich on vaginosis panel 3/2020.    (R87.613) Papanicolaou smear of cervix with high grade squamous intraepithelial lesion (HGSIL)  (primary encounter diagnosis)    (R87.810) Cervical high risk human p

## 2021-11-04 NOTE — PROGRESS NOTES
Pilo Rodriguez  [id number] attended Step 2 Group Diabetes Education Class:     Date: 11/3/2021 Start time: 6:00 pm End time: 8:00 pm    Wt: 163.5 lbs     Diabetes Overview:  Pathophysiology, pre-diabetes, A1C results, treatment options for diabete goals.    Written materials provided for all areas covered. Patient verbalized understanding and has no further questions currently.     Christina Ritchie RN

## 2021-11-04 NOTE — ADDENDUM NOTE
Encounter addended by: Harsha Bonilla RN on: 11/4/2021 4:48 PM   Actions taken: Charge Capture section accepted

## 2021-11-04 NOTE — PROGRESS NOTES
Glen Brain  : 1980 was seen for Individual Continuous Glucose Sensor Follow-up Visit: sensor download    Date: 2021  Start time: 4 pm End time: 5 pm      Sensor Type: Alissa   Site Assessment: left upper forearm, and barrier film was marianne

## 2021-11-05 ENCOUNTER — OFFICE VISIT (OUTPATIENT)
Dept: OBGYN CLINIC | Facility: CLINIC | Age: 41
End: 2021-11-05
Payer: COMMERCIAL

## 2021-11-05 VITALS — SYSTOLIC BLOOD PRESSURE: 167 MMHG | DIASTOLIC BLOOD PRESSURE: 95 MMHG

## 2021-11-05 DIAGNOSIS — A59.9 TRICHOMONAS INFECTION: ICD-10-CM

## 2021-11-05 DIAGNOSIS — Z01.812 PRE-PROCEDURAL LABORATORY EXAMINATION: ICD-10-CM

## 2021-11-05 DIAGNOSIS — R87.613 PAPANICOLAOU SMEAR OF CERVIX WITH HIGH GRADE SQUAMOUS INTRAEPITHELIAL LESION (HGSIL): Primary | ICD-10-CM

## 2021-11-05 PROCEDURE — 57454 BX/CURETT OF CERVIX W/SCOPE: CPT | Performed by: OBSTETRICS & GYNECOLOGY

## 2021-11-05 PROCEDURE — 81025 URINE PREGNANCY TEST: CPT | Performed by: OBSTETRICS & GYNECOLOGY

## 2021-11-05 PROCEDURE — 3080F DIAST BP >= 90 MM HG: CPT | Performed by: OBSTETRICS & GYNECOLOGY

## 2021-11-05 PROCEDURE — 99214 OFFICE O/P EST MOD 30 MIN: CPT | Performed by: OBSTETRICS & GYNECOLOGY

## 2021-11-05 PROCEDURE — 3077F SYST BP >= 140 MM HG: CPT | Performed by: OBSTETRICS & GYNECOLOGY

## 2021-11-05 NOTE — PROCEDURES
[facility]  Obstetrics and Gynecology  Colposcopy Procedure Note  Tami Beach MD    Colpo w/Cx Biopsy and ECC    Pregnancy Results: negative from urine test     Consent signed.   Procedure discussed with patient in detail including indication, risk

## 2021-11-05 NOTE — PROGRESS NOTES
[facility], [facility]  Obstetrics and Gynecology  Focused Gynecology Problem Exam  Alcides Yates MD    Alessandra Steinberg is a 35year old female presenting for Procedure (colposcopy )  .     HPI:   Patient presents with:  Procedure: colposcopy     Patient had t • Continuous Blood Gluc  (FREESTYLE CHAYA 2 READER) Does not apply Device 1 each by Does not apply route daily.  1 each 0   • Continuous Blood Gluc Sensor (FREESTYLE CHAYA 2 SENSOR) Does not apply Misc 1 each by Does not apply route every 14 (four (90 Base) MCG/ACT Inhalation Aero Soln Inhale 2 puffs into the lungs every 6 (six) hours as needed for Wheezing or Shortness of Breath.  (Patient not taking: Reported on 10/20/2021) 1 Inhaler 2   • ALPRAZolam 0.25 MG Oral Tab Take 1 tablet (0.25 mg total) b activity: Not on file    Other Topics      Concerns:         Service: Not Asked        Blood Transfusions: Not Asked        Caffeine Concern: Yes          2 cups coffee daily        Occupational Exposure: Not Asked        Hobby Hazards: Not Asked Sitting)   LMP 09/29/2021     GENERAL: well developed, well nourished, in no apparent distress  ABDOMEN: Soft, non distended; non tender, no masses  GYNE/: External Genitalia: Normal appearing, no lesions.  Urethral meatus appear wnl, no abnormal discharg yesterday. I reviewed recommendations for her treatment as well as her partner. They should not be sexually active until 10 days after treatment was begun.   I recommended a repeat screen for trichomonas in 2 months to evaluate for recurrence or persisten

## 2021-11-09 ENCOUNTER — TELEPHONE (OUTPATIENT)
Dept: OBGYN CLINIC | Facility: CLINIC | Age: 41
End: 2021-11-09

## 2021-11-09 NOTE — TELEPHONE ENCOUNTER
----- Message from Rosibel De La Cruz MD sent at 11/9/2021 11:23 AM CST -----  Result noted. Please notify patient of CATHLEEN-2/moderate dysplasia on her cervical biopsies.   She should be set up for a video visit  or in office visit to discuss the results and

## 2021-11-09 NOTE — TELEPHONE ENCOUNTER
Please notify patient that the LEEP will be able to be done at the time of her surgery on 11/24/2021. We will discuss this at her video visit.

## 2021-11-09 NOTE — TELEPHONE ENCOUNTER
Pt called and informed of results and recommendations. Pt voices understanding. Pt scheduled for a video visit on 11/18 at 12:30 pm with tara. Pt voices she is having a tubal  And IUD removal on 11/24. Pt wants to know if LEEP can be done at that visit.

## 2021-11-09 NOTE — TELEPHONE ENCOUNTER
Please add colposcopy and cervical LEEP to patient's procedure in the OR on 11/24/2021.   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

## 2021-11-10 NOTE — TELEPHONE ENCOUNTER
Alonzo Quinones MD routed conversation to [facility]/Referral 18 hours ago (2:27 PM)     Alonzo Quinones MD 18 hours ago (2:27 PM)        Please add colposcopy and cervical LEEP to patient's procedure in the OR on 11/24/2021.   The diagno

## 2021-11-18 ENCOUNTER — TELEMEDICINE (OUTPATIENT)
Dept: OBGYN CLINIC | Facility: CLINIC | Age: 41
End: 2021-11-18

## 2021-11-18 DIAGNOSIS — N87.1 CIN II (CERVICAL INTRAEPITHELIAL NEOPLASIA II): Primary | ICD-10-CM

## 2021-11-18 DIAGNOSIS — N89.8 VAGINAL DISCHARGE: ICD-10-CM

## 2021-11-18 DIAGNOSIS — A59.9 TRICHOMONAS INFECTION: ICD-10-CM

## 2021-11-18 PROCEDURE — 99213 OFFICE O/P EST LOW 20 MIN: CPT | Performed by: OBSTETRICS & GYNECOLOGY

## 2021-11-19 NOTE — TELEPHONE ENCOUNTER
Pt can be moved to 12/8/2021 as the 4th case/last case that day. Please confirm with me if patient changes dates.

## 2021-11-19 NOTE — TELEPHONE ENCOUNTER
Called and spoke with pt. Pt desires to reschedule to 12/8/2021. Surgery case change request sent to reschedule. Will send mychart msg to pt with new time and date. Pt aware to be NPO after 06:30am. Pt verbalized understanding to all and agrees with plan.

## 2021-11-19 NOTE — TELEPHONE ENCOUNTER
Pt called asking about rescheduling her surgery that is currently scheduled on 11/24. Reports after discussing with JJF yesterday notes that she will be on her menses the week of procedure. Wondering about other dates in December.  Will be on menses again w

## 2021-11-19 NOTE — PROGRESS NOTES
[facility], [facility]  Obstetrics and Gynecology  Video Visit  Focused Gynecology Problem Exam  Lynda Curtis MD    Carlton Dunn is a 35year old female presenting for follow up of colposcopy and biopsy.     HPI:   Pt here to discuss colpo biopsy results w Gluc Sensor (FREESTYLE CHAYA 2 SENSOR) Does not apply Misc 1 each by Does not apply route every 14 (fourteen) days.  2 each 2   • glipiZIDE 5 MG Oral Tab Take 1 tablet (5 mg total) by mouth every morning before breakfast AND 1 tablet (5 mg total) daily with taking: Reported on 10/20/2021) 1 Inhaler 2   • ALPRAZolam 0.25 MG Oral Tab Take 1 tablet (0.25 mg total) by mouth every 6 (six) hours as needed for Sleep.  30 tablet 0     Allergies:    Amlodipine              SWELLING  HISTORY:     OB History    Pa Yes          2 cups coffee daily        Occupational Exposure: Not Asked        Hobby Hazards: Not Asked        Sleep Concern: Not Asked        Stress Concern: Not Asked        Weight Concern: Not Asked        Special Diet: Not Asked        Back Care: Not CATHLEEN II/Mild dysplasia. I discussed recommendations for treatment of HGSIL with CATHLEEN II.  I counseled patient on LEEP procedure. I discussed risks of LEEP including infection and bleeding.   I discussed that this is commonly done in the office as a local an

## 2021-12-01 RX ORDER — SEMAGLUTIDE 1.34 MG/ML
0.5 INJECTION, SOLUTION SUBCUTANEOUS WEEKLY
Qty: 4.5 ML | Refills: 0 | Status: SHIPPED | OUTPATIENT
Start: 2021-12-01 | End: 2022-01-31

## 2021-12-05 ENCOUNTER — LAB ENCOUNTER (OUTPATIENT)
Dept: LAB | Facility: HOSPITAL | Age: 41
End: 2021-12-05
Attending: OBSTETRICS & GYNECOLOGY
Payer: COMMERCIAL

## 2021-12-05 DIAGNOSIS — Z01.818 PREOP TESTING: ICD-10-CM

## 2021-12-06 RX ORDER — FLASH GLUCOSE SENSOR
1 KIT MISCELLANEOUS
Qty: 6 EACH | Refills: 0 | Status: SHIPPED | OUTPATIENT
Start: 2021-12-06

## 2021-12-06 NOTE — TELEPHONE ENCOUNTER
Pt informed of surgery time change. Aware to arrive 2 hours prior. Gave pt fax number to send FMLA forms.

## 2021-12-07 ENCOUNTER — LAB ENCOUNTER (OUTPATIENT)
Dept: LAB | Age: 41
End: 2021-12-07
Attending: INTERNAL MEDICINE
Payer: COMMERCIAL

## 2021-12-07 DIAGNOSIS — Z11.59 ENCOUNTER FOR SCREENING FOR OTHER VIRAL DISEASES: ICD-10-CM

## 2021-12-07 DIAGNOSIS — Z01.818 PRE-OP TESTING: ICD-10-CM

## 2021-12-08 ENCOUNTER — ANESTHESIA EVENT (OUTPATIENT)
Dept: SURGERY | Facility: HOSPITAL | Age: 41
End: 2021-12-08
Payer: COMMERCIAL

## 2021-12-08 ENCOUNTER — HOSPITAL ENCOUNTER (OUTPATIENT)
Facility: HOSPITAL | Age: 41
Setting detail: HOSPITAL OUTPATIENT SURGERY
Discharge: HOME | End: 2021-12-08
Attending: OBSTETRICS & GYNECOLOGY | Admitting: OBSTETRICS & GYNECOLOGY
Payer: COMMERCIAL

## 2021-12-08 ENCOUNTER — ANESTHESIA (OUTPATIENT)
Dept: SURGERY | Facility: HOSPITAL | Age: 41
End: 2021-12-08
Payer: COMMERCIAL

## 2021-12-08 VITALS
DIASTOLIC BLOOD PRESSURE: 80 MMHG | BODY MASS INDEX: 27.46 KG/M2 | HEART RATE: 99 BPM | WEIGHT: 155 LBS | SYSTOLIC BLOOD PRESSURE: 146 MMHG | HEIGHT: 63 IN | TEMPERATURE: 97 F | RESPIRATION RATE: 16 BRPM | OXYGEN SATURATION: 100 %

## 2021-12-08 DIAGNOSIS — Z01.818 PREOP TESTING: Primary | ICD-10-CM

## 2021-12-08 DIAGNOSIS — Z30.09 CONSULTATION FOR FEMALE STERILIZATION: ICD-10-CM

## 2021-12-08 DIAGNOSIS — T83.32XA INTRAUTERINE CONTRACEPTIVE DEVICE THREADS LOST, INITIAL ENCOUNTER: ICD-10-CM

## 2021-12-08 PROCEDURE — 57460 BX OF CERVIX W/SCOPE LEEP: CPT | Performed by: OBSTETRICS & GYNECOLOGY

## 2021-12-08 PROCEDURE — 0UBC8ZZ EXCISION OF CERVIX, VIA NATURAL OR ARTIFICIAL OPENING ENDOSCOPIC: ICD-10-PCS | Performed by: OBSTETRICS & GYNECOLOGY

## 2021-12-08 PROCEDURE — 0UPD7HZ REMOVAL OF CONTRACEPTIVE DEVICE FROM UTERUS AND CERVIX, VIA NATURAL OR ARTIFICIAL OPENING: ICD-10-PCS | Performed by: OBSTETRICS & GYNECOLOGY

## 2021-12-08 PROCEDURE — 58301 REMOVE INTRAUTERINE DEVICE: CPT | Performed by: OBSTETRICS & GYNECOLOGY

## 2021-12-08 PROCEDURE — 58661 LAPAROSCOPY REMOVE ADNEXA: CPT | Performed by: OBSTETRICS & GYNECOLOGY

## 2021-12-08 PROCEDURE — 0UT74ZZ RESECTION OF BILATERAL FALLOPIAN TUBES, PERCUTANEOUS ENDOSCOPIC APPROACH: ICD-10-PCS | Performed by: OBSTETRICS & GYNECOLOGY

## 2021-12-08 RX ORDER — ONDANSETRON 2 MG/ML
4 INJECTION INTRAMUSCULAR; INTRAVENOUS ONCE AS NEEDED
Status: DISCONTINUED | OUTPATIENT
Start: 2021-12-08 | End: 2021-12-08

## 2021-12-08 RX ORDER — IODINE SOLUTION STRONG 5% (LUGOL'S) 5 %
SOLUTION ORAL AS NEEDED
Status: DISCONTINUED | OUTPATIENT
Start: 2021-12-08 | End: 2021-12-08 | Stop reason: HOSPADM

## 2021-12-08 RX ORDER — ACETAMINOPHEN 500 MG
1000 TABLET ORAL ONCE
Status: COMPLETED | OUTPATIENT
Start: 2021-12-08 | End: 2021-12-08

## 2021-12-08 RX ORDER — KETOROLAC TROMETHAMINE 30 MG/ML
INJECTION, SOLUTION INTRAMUSCULAR; INTRAVENOUS AS NEEDED
Status: DISCONTINUED | OUTPATIENT
Start: 2021-12-08 | End: 2021-12-08 | Stop reason: SURG

## 2021-12-08 RX ORDER — PROCHLORPERAZINE EDISYLATE 5 MG/ML
5 INJECTION INTRAMUSCULAR; INTRAVENOUS ONCE AS NEEDED
Status: DISCONTINUED | OUTPATIENT
Start: 2021-12-08 | End: 2021-12-08

## 2021-12-08 RX ORDER — SODIUM CHLORIDE, SODIUM LACTATE, POTASSIUM CHLORIDE, CALCIUM CHLORIDE 600; 310; 30; 20 MG/100ML; MG/100ML; MG/100ML; MG/100ML
INJECTION, SOLUTION INTRAVENOUS CONTINUOUS
Status: DISCONTINUED | OUTPATIENT
Start: 2021-12-08 | End: 2021-12-08

## 2021-12-08 RX ORDER — HYDROMORPHONE HYDROCHLORIDE 1 MG/ML
0.2 INJECTION, SOLUTION INTRAMUSCULAR; INTRAVENOUS; SUBCUTANEOUS EVERY 5 MIN PRN
Status: DISCONTINUED | OUTPATIENT
Start: 2021-12-08 | End: 2021-12-08

## 2021-12-08 RX ORDER — PHENYLEPHRINE HCL 10 MG/ML
VIAL (ML) INJECTION AS NEEDED
Status: DISCONTINUED | OUTPATIENT
Start: 2021-12-08 | End: 2021-12-08 | Stop reason: SURG

## 2021-12-08 RX ORDER — DEXTROSE MONOHYDRATE 25 G/50ML
50 INJECTION, SOLUTION INTRAVENOUS
Status: DISCONTINUED | OUTPATIENT
Start: 2021-12-08 | End: 2021-12-08

## 2021-12-08 RX ORDER — NALOXONE HYDROCHLORIDE 0.4 MG/ML
80 INJECTION, SOLUTION INTRAMUSCULAR; INTRAVENOUS; SUBCUTANEOUS AS NEEDED
Status: DISCONTINUED | OUTPATIENT
Start: 2021-12-08 | End: 2021-12-08

## 2021-12-08 RX ORDER — MIDAZOLAM HYDROCHLORIDE 1 MG/ML
INJECTION INTRAMUSCULAR; INTRAVENOUS AS NEEDED
Status: DISCONTINUED | OUTPATIENT
Start: 2021-12-08 | End: 2021-12-08 | Stop reason: SURG

## 2021-12-08 RX ORDER — LIDOCAINE HYDROCHLORIDE 10 MG/ML
INJECTION, SOLUTION EPIDURAL; INFILTRATION; INTRACAUDAL; PERINEURAL AS NEEDED
Status: DISCONTINUED | OUTPATIENT
Start: 2021-12-08 | End: 2021-12-08 | Stop reason: SURG

## 2021-12-08 RX ORDER — HYDROMORPHONE HYDROCHLORIDE 1 MG/ML
0.4 INJECTION, SOLUTION INTRAMUSCULAR; INTRAVENOUS; SUBCUTANEOUS EVERY 5 MIN PRN
Status: DISCONTINUED | OUTPATIENT
Start: 2021-12-08 | End: 2021-12-08

## 2021-12-08 RX ORDER — MORPHINE SULFATE 4 MG/ML
2 INJECTION, SOLUTION INTRAMUSCULAR; INTRAVENOUS EVERY 10 MIN PRN
Status: DISCONTINUED | OUTPATIENT
Start: 2021-12-08 | End: 2021-12-08

## 2021-12-08 RX ORDER — MORPHINE SULFATE 4 MG/ML
4 INJECTION, SOLUTION INTRAMUSCULAR; INTRAVENOUS EVERY 10 MIN PRN
Status: DISCONTINUED | OUTPATIENT
Start: 2021-12-08 | End: 2021-12-08

## 2021-12-08 RX ORDER — HYDROMORPHONE HYDROCHLORIDE 1 MG/ML
0.6 INJECTION, SOLUTION INTRAMUSCULAR; INTRAVENOUS; SUBCUTANEOUS EVERY 5 MIN PRN
Status: DISCONTINUED | OUTPATIENT
Start: 2021-12-08 | End: 2021-12-08

## 2021-12-08 RX ORDER — MORPHINE SULFATE 10 MG/ML
6 INJECTION, SOLUTION INTRAMUSCULAR; INTRAVENOUS EVERY 10 MIN PRN
Status: DISCONTINUED | OUTPATIENT
Start: 2021-12-08 | End: 2021-12-08

## 2021-12-08 RX ORDER — NEOSTIGMINE METHYLSULFATE 1 MG/ML
INJECTION INTRAVENOUS AS NEEDED
Status: DISCONTINUED | OUTPATIENT
Start: 2021-12-08 | End: 2021-12-08 | Stop reason: SURG

## 2021-12-08 RX ORDER — BUPIVACAINE HYDROCHLORIDE 2.5 MG/ML
INJECTION, SOLUTION EPIDURAL; INFILTRATION; INTRACAUDAL AS NEEDED
Status: DISCONTINUED | OUTPATIENT
Start: 2021-12-08 | End: 2021-12-08 | Stop reason: HOSPADM

## 2021-12-08 RX ORDER — ROCURONIUM BROMIDE 10 MG/ML
INJECTION, SOLUTION INTRAVENOUS AS NEEDED
Status: DISCONTINUED | OUTPATIENT
Start: 2021-12-08 | End: 2021-12-08 | Stop reason: SURG

## 2021-12-08 RX ORDER — HYDROCODONE BITARTRATE AND ACETAMINOPHEN 5; 325 MG/1; MG/1
2 TABLET ORAL AS NEEDED
Status: DISCONTINUED | OUTPATIENT
Start: 2021-12-08 | End: 2021-12-08

## 2021-12-08 RX ORDER — HYDROCODONE BITARTRATE AND ACETAMINOPHEN 5; 325 MG/1; MG/1
1 TABLET ORAL AS NEEDED
Status: DISCONTINUED | OUTPATIENT
Start: 2021-12-08 | End: 2021-12-08

## 2021-12-08 RX ORDER — HALOPERIDOL 5 MG/ML
0.25 INJECTION INTRAMUSCULAR ONCE AS NEEDED
Status: DISCONTINUED | OUTPATIENT
Start: 2021-12-08 | End: 2021-12-08

## 2021-12-08 RX ORDER — GLYCOPYRROLATE 0.2 MG/ML
INJECTION, SOLUTION INTRAMUSCULAR; INTRAVENOUS AS NEEDED
Status: DISCONTINUED | OUTPATIENT
Start: 2021-12-08 | End: 2021-12-08 | Stop reason: SURG

## 2021-12-08 RX ORDER — HYDROCODONE BITARTRATE AND ACETAMINOPHEN 5; 325 MG/1; MG/1
1 TABLET ORAL EVERY 6 HOURS PRN
Qty: 6 TABLET | Refills: 0 | Status: SHIPPED | OUTPATIENT
Start: 2021-12-08 | End: 2021-12-10

## 2021-12-08 RX ORDER — ONDANSETRON 2 MG/ML
INJECTION INTRAMUSCULAR; INTRAVENOUS AS NEEDED
Status: DISCONTINUED | OUTPATIENT
Start: 2021-12-08 | End: 2021-12-08 | Stop reason: SURG

## 2021-12-08 RX ORDER — IBUPROFEN 600 MG/1
600 TABLET ORAL EVERY 6 HOURS PRN
Qty: 30 TABLET | Refills: 0 | Status: SHIPPED | OUTPATIENT
Start: 2021-12-08 | End: 2021-12-14

## 2021-12-08 RX ORDER — ACETIC ACID 0.25 G/100ML
IRRIGANT IRRIGATION AS NEEDED
Status: DISCONTINUED | OUTPATIENT
Start: 2021-12-08 | End: 2021-12-08 | Stop reason: HOSPADM

## 2021-12-08 RX ADMIN — PHENYLEPHRINE HCL 100 MCG: 10 MG/ML VIAL (ML) INJECTION at 10:56:00

## 2021-12-08 RX ADMIN — ONDANSETRON 4 MG: 2 INJECTION INTRAMUSCULAR; INTRAVENOUS at 11:39:00

## 2021-12-08 RX ADMIN — PHENYLEPHRINE HCL 100 MCG: 10 MG/ML VIAL (ML) INJECTION at 10:31:00

## 2021-12-08 RX ADMIN — PHENYLEPHRINE HCL 100 MCG: 10 MG/ML VIAL (ML) INJECTION at 10:43:00

## 2021-12-08 RX ADMIN — PHENYLEPHRINE HCL 100 MCG: 10 MG/ML VIAL (ML) INJECTION at 11:09:00

## 2021-12-08 RX ADMIN — LIDOCAINE HYDROCHLORIDE 50 MG: 10 INJECTION, SOLUTION EPIDURAL; INFILTRATION; INTRACAUDAL; PERINEURAL at 10:14:00

## 2021-12-08 RX ADMIN — SODIUM CHLORIDE, SODIUM LACTATE, POTASSIUM CHLORIDE, CALCIUM CHLORIDE: 600; 310; 30; 20 INJECTION, SOLUTION INTRAVENOUS at 11:41:00

## 2021-12-08 RX ADMIN — NEOSTIGMINE METHYLSULFATE 3.5 MG: 1 INJECTION INTRAVENOUS at 11:43:00

## 2021-12-08 RX ADMIN — SODIUM CHLORIDE, SODIUM LACTATE, POTASSIUM CHLORIDE, CALCIUM CHLORIDE: 600; 310; 30; 20 INJECTION, SOLUTION INTRAVENOUS at 10:10:00

## 2021-12-08 RX ADMIN — ROCURONIUM BROMIDE 40 MG: 10 INJECTION, SOLUTION INTRAVENOUS at 10:14:00

## 2021-12-08 RX ADMIN — ROCURONIUM BROMIDE 10 MG: 10 INJECTION, SOLUTION INTRAVENOUS at 11:19:00

## 2021-12-08 RX ADMIN — PHENYLEPHRINE HCL 100 MCG: 10 MG/ML VIAL (ML) INJECTION at 10:25:00

## 2021-12-08 RX ADMIN — GLYCOPYRROLATE 0.5 MG: 0.2 INJECTION, SOLUTION INTRAMUSCULAR; INTRAVENOUS at 11:43:00

## 2021-12-08 RX ADMIN — PHENYLEPHRINE HCL 100 MCG: 10 MG/ML VIAL (ML) INJECTION at 11:30:00

## 2021-12-08 RX ADMIN — KETOROLAC TROMETHAMINE 30 MG: 30 INJECTION, SOLUTION INTRAMUSCULAR; INTRAVENOUS at 11:42:00

## 2021-12-08 RX ADMIN — PHENYLEPHRINE HCL 100 MCG: 10 MG/ML VIAL (ML) INJECTION at 11:03:00

## 2021-12-08 RX ADMIN — MIDAZOLAM HYDROCHLORIDE 2 MG: 1 INJECTION INTRAMUSCULAR; INTRAVENOUS at 10:10:00

## 2021-12-08 NOTE — ANESTHESIA PREPROCEDURE EVALUATION
Anesthesia PreOp Note    HPI:     Roberto Coulter is a 35year old female who presents for preoperative consultation requested by: Alonzo Quinones MD    Date of Surgery: 12/8/2021    Procedure(s):  removal of intrauterine device with possible hysteros Date Noted: 11/21/2017      Dehydration         Date Noted: 11/21/2017      Hypokalemia         Date Noted: 11/21/2017      Type 2 diabetes mellitus with microalbuminuria, without long-term current use of insulin [facility])         Date Noted: 11/04/2016 Units into the skin every morning.), Disp: 30 mL, Rfl: 0, More than a month at Unknown time  Glucose Blood (CONTOUR NEXT TEST) In Vitro Strip, Check blood sugar twice a day before meals, Disp: 200 strip, Rfl: 0  Blood Glucose Monitoring Suppl (CONTOUR NEXT Father         ALS   • Other (graves) Sister         sister   • Breast Cancer Other 40        maternal cousin     Social History    Socioeconomic History      Marital status: Life Partner      Spouse name: Not on file      Number of children: Not on file respiration is 16 and oxygen saturation is 98%.     12/03/21  1200 12/08/21  0931   BP:  113/70   Pulse:  102   Resp:  16   Temp:  98.5 °F (36.9 °C)   TempSrc:  Oral   SpO2:  98%   Weight: 70.3 kg (155 lb) 70.3 kg (155 lb)   Height: 1.6 m (5' 3\") 1.6 m (5'

## 2021-12-08 NOTE — ANESTHESIA PROCEDURE NOTES
Airway  Date/Time: 12/8/2021 10:17 AM  Urgency: Elective    Airway not difficult    General Information and Staff    Patient location during procedure: OR  Anesthesiologist: Sarai Freitas MD  Resident/CRNA: Ava Saravia CRNA  Performed: SHANIQUA     I

## 2021-12-08 NOTE — ANESTHESIA POSTPROCEDURE EVALUATION
Patient: Astrid Diego    Procedure Summary     Date: 12/08/21 Room / Location: [address] [place] [address] MAIN OR 06 / [address] [place] [address] MAIN OR    Anesthesia Start: 1010 Anesthesia Stop: 1209    Procedure: removal of intrauterine device, laparoscopic lysis of adhesions, laparoscopic katharine

## 2021-12-08 NOTE — H&P
[facility], [facility]  Obstetrics and Gynecology  Focused Gynecology Problem Exam  MD Jose M Southa Brandie is a 35year old female presenting for removal of IUD with lost IUD string, possible hysteroscopy followed by a laparoscopic bilateral salp Smoker        Packs/day: 0.50        Types: Cigarettes      Smokeless tobacco: Never Used      Tobacco comment: 1 cigarette q3 days    Vaping Use      Vaping Use: Never used    Substance and Sexual Activity      Alcohol use:  Yes        Alcohol/week: 6.0 st enlargement. A small splenule is noted.     ADRENALS:   No defined mass or abnormal enlargement.     KIDNEYS:   Symmetric enhancement is seen without evidence of hydronephrosis or underlying solid masses. GI/MESENTERY: A small hiatal hernia is evident.  A is recommended for further assessment.       5.  Intrauterine contraceptive device in place.       6. Hepatomegaly.       7. Status post cholecystectomy without significant hepatobiliary dilatation.       8. Lesser incidental findings as above.             INHIBITORS (ACE-I)  NO ANGIOTENSIN RECEPTOR BLOCKER (ARB)  CARE ORDER/INSTRUCTION  CARE ORDER/INSTRUCTION  ACCU-CHEK  NOTIFY PHYSICIAN (SPECIFY)  NURSING COMMUNICATION  POCT PREGNANCY URINE  PLACE PIV  VERIFY INFORMED CONSENT  NPO  NOTIFY PHYSICIAN (SPECIF

## 2021-12-08 NOTE — BRIEF OP NOTE
[address] RECOVERY  Operative Note     Amiejessica Leetoyin Location: OR   [facility] [id number] MRN [id number]   Admission Date 12/8/2021 Operation Date 12/8/2021   Attending Physician Carol Donaldson MD Operating Physician Jin Elias MD, MD dysplasia.      Complications: None     Specimen:   ID Type Source Tests Collected by Time Destination   1 : 1. left fallopian tube Tissue Fallopian tube left SURGICAL PATHOLOGY TISSUE Tamara Rodriguez MD 12/8/2021 11:27 AM    2 : 2. right fallopian tube

## 2021-12-09 NOTE — OPERATIVE REPORT
[facility]    PATIENT'S NAME: Andi Quispe   ATTENDING PHYSICIAN: Bernard Ortiz MD   OPERATING PHYSICIAN: Bernard Ortiz MD   PATIENT ACCOUNT#:   [id number]    LOCATION:  [place] [address]  MEDICAL RECORD #:   [id number]       D normal-appearing uterus. Colposcopy showed areas of white epithelium, punctations consistent with moderate dysplasia.     OPERATIVE TECHNIQUE:  After informed consent was obtained, the patient was prepped and draped in usual sterile fashion in a dorsal lit the anterior abdominal wall. These adhesions were taken down meticulously in a sharp fashion and freeing all of the trocar sites.   Once the anterior abdominal wall and pelvic omental adhesions were excised, the pelvis was visualized with the uterus, tubes procedure was deemed to be complete. The patient tolerated the procedure well. All lap and instrument counts were correct. She was extubated, awoken, and transferred to recovery room in good condition.     ADDENDUM:  Prior to the colposcopy, the uterine

## 2021-12-10 ENCOUNTER — TELEPHONE (OUTPATIENT)
Dept: OBGYN CLINIC | Facility: CLINIC | Age: 41
End: 2021-12-10

## 2021-12-10 ENCOUNTER — HOSPITAL ENCOUNTER (OUTPATIENT)
Dept: ULTRASOUND IMAGING | Facility: HOSPITAL | Age: 41
Discharge: HOME | End: 2021-12-10
Attending: INTERNAL MEDICINE
Payer: COMMERCIAL

## 2021-12-10 VITALS — DIASTOLIC BLOOD PRESSURE: 86 MMHG | SYSTOLIC BLOOD PRESSURE: 168 MMHG

## 2021-12-10 DIAGNOSIS — R92.2 INCONCLUSIVE MAMMOGRAM: ICD-10-CM

## 2021-12-10 DIAGNOSIS — R92.2 DENSE BREAST: ICD-10-CM

## 2021-12-10 DIAGNOSIS — E04.1 THYROID NODULE: ICD-10-CM

## 2021-12-10 PROCEDURE — 10005 FNA BX W/US GDN 1ST LES: CPT | Performed by: INTERNAL MEDICINE

## 2021-12-10 PROCEDURE — 88173 CYTOPATH EVAL FNA REPORT: CPT | Performed by: INTERNAL MEDICINE

## 2021-12-10 PROCEDURE — 88172 CYTP DX EVAL FNA 1ST EA SITE: CPT | Performed by: INTERNAL MEDICINE

## 2021-12-10 PROCEDURE — 88177 CYTP FNA EVAL EA ADDL: CPT | Performed by: INTERNAL MEDICINE

## 2021-12-10 NOTE — IMAGING NOTE
[phone] Pt arrived to ultrasound room #1    1230 Scans taken by [facility] ultrasound  sonographer      History taken and as follows: INCIDENTALLY FOUND     1222 Procedure explained questions answered.     1223 Consent verified and obtained      1236  scans re

## 2021-12-10 NOTE — TELEPHONE ENCOUNTER
Pt will be submitting FMLA. Claire Khoury Pt first day off work 12/8/21 - RTW 12/23/21.  [employer] message sent for [employer]

## 2021-12-13 DIAGNOSIS — E04.1 THYROID NODULE: Primary | ICD-10-CM

## 2021-12-13 NOTE — TELEPHONE ENCOUNTER
Patient calling to follow up on these forms. Her employer is asking for them to be returned as soon as possible. Please advise. Dr Barb Sepulveda was recommending her return to work date would be 12/23 but they have not confirmed that.

## 2021-12-14 ENCOUNTER — TELEPHONE (OUTPATIENT)
Dept: OBGYN CLINIC | Facility: CLINIC | Age: 41
End: 2021-12-14

## 2021-12-14 RX ORDER — IBUPROFEN 600 MG/1
600 TABLET ORAL EVERY 6 HOURS PRN
Qty: 30 TABLET | Refills: 0 | Status: SHIPPED | OUTPATIENT
Start: 2021-12-14 | End: 2021-12-28

## 2021-12-14 NOTE — TELEPHONE ENCOUNTER
Please notify patient that rx is ready for Motrin but she only needs to take it as needed. If she is not having post operative pain, she does not need to take the medication.

## 2021-12-14 NOTE — TELEPHONE ENCOUNTER
Pt Name and  verified. Pt scheduled and she'd like to know an approximate return to work date as her employer is pressing her to find out at least an approximate time.  pls advise

## 2021-12-15 NOTE — TELEPHONE ENCOUNTER
Pt called upset stating that forms were submitted on 12/6/21 but never received and there is now a delay in processing her forms and her employer is aware of turnaround but is only allowing 15 business.  Informed pt that I will expedite her form and start i

## 2021-12-20 NOTE — TELEPHONE ENCOUNTER
Dr. Roxana Guzman     Please sign off on form: Disability  -Highlight the patient and hit \"Chart\" button.   -In Chart Review, w/in the Encounter tab - click 1 time on the Telephone call encounter for 12/10/2021 Scroll down the telephone encounter.  -Click Lilia Gamino

## 2021-12-21 NOTE — TELEPHONE ENCOUNTER
Returned pt's call, informed that completed disability forms have been faxed to [facility] at [phone], confirmation received, pt verbalized understanding. Pt will be picking up a completed copy of form as well.

## 2021-12-22 ENCOUNTER — TELEMEDICINE (OUTPATIENT)
Dept: OBGYN CLINIC | Facility: CLINIC | Age: 41
End: 2021-12-22

## 2021-12-22 DIAGNOSIS — Z98.890 HISTORY OF FEMALE STERILIZATION: ICD-10-CM

## 2021-12-22 DIAGNOSIS — Z98.890 POST-OPERATIVE STATE: Primary | ICD-10-CM

## 2021-12-22 DIAGNOSIS — N87.1 MODERATE DYSPLASIA OF CERVIX (CIN II): ICD-10-CM

## 2021-12-22 PROCEDURE — 99024 POSTOP FOLLOW-UP VISIT: CPT | Performed by: OBSTETRICS & GYNECOLOGY

## 2021-12-23 NOTE — PROGRESS NOTES
[facility], [facility]  Obstetrics and Gynecology  [facility] 26 Video Visit  Focused Gynecology Problem Exam  Alcides Yates MD    Alessandra Steinberg is a 35year old female presenting for post op visit.     HPI:   Pt is s/p IUD removal and laparoscopic lysis of adhesi times daily before meals and nightly. 1200 mL 0   • Blood Glucose Monitoring Suppl (ONETOUCH VERIO FLEX SYSTEM) w/Device Does not apply Kit Test blood glucose twice daily (before breakfast and before dinner).  Diagnosis: diabetes mellitus 2, E11.9 1 kit 0 cousin       Pathology:     Final Diagnosis:      A. Left fallopian tube:   · Fallopian tube segment, completely transected.     B. Right fallopian tube:   · Fallopian tube segment, completely transected.     C.  Cervical LEEP:   · History of high-grade squ

## 2021-12-28 ENCOUNTER — OFFICE VISIT (OUTPATIENT)
Dept: FAMILY MEDICINE CLINIC | Facility: CLINIC | Age: 41
End: 2021-12-28
Payer: COMMERCIAL

## 2021-12-28 VITALS
SYSTOLIC BLOOD PRESSURE: 132 MMHG | TEMPERATURE: 99 F | RESPIRATION RATE: 16 BRPM | OXYGEN SATURATION: 98 % | HEIGHT: 63 IN | BODY MASS INDEX: 27.11 KG/M2 | HEART RATE: 92 BPM | DIASTOLIC BLOOD PRESSURE: 72 MMHG | WEIGHT: 153 LBS

## 2021-12-28 DIAGNOSIS — Z20.822 SUSPECTED COVID-19 VIRUS INFECTION: Primary | ICD-10-CM

## 2021-12-28 DIAGNOSIS — J02.9 SORE THROAT: ICD-10-CM

## 2021-12-28 PROCEDURE — 3008F BODY MASS INDEX DOCD: CPT | Performed by: PHYSICIAN ASSISTANT

## 2021-12-28 PROCEDURE — 3075F SYST BP GE 130 - 139MM HG: CPT | Performed by: PHYSICIAN ASSISTANT

## 2021-12-28 PROCEDURE — 99213 OFFICE O/P EST LOW 20 MIN: CPT | Performed by: PHYSICIAN ASSISTANT

## 2021-12-28 PROCEDURE — 3078F DIAST BP <80 MM HG: CPT | Performed by: PHYSICIAN ASSISTANT

## 2021-12-28 PROCEDURE — 87880 STREP A ASSAY W/OPTIC: CPT | Performed by: PHYSICIAN ASSISTANT

## 2021-12-28 RX ORDER — IBUPROFEN 600 MG/1
TABLET ORAL
Qty: 30 TABLET | Refills: 0 | Status: SHIPPED | OUTPATIENT
Start: 2021-12-28

## 2021-12-28 NOTE — PROGRESS NOTES
CHIEF COMPLAINT:   Patient presents with:  Covid      HPI:   Yassine Howell is a 35year old female who presents with symptoms as described below for 2 days.    Arnulfo night 12/26/21    • Fever:     Yes []     No [x]       • Cough:    Yes []     No [x] Subcutaneous Solution Pen-injector Inject 28 Units into the skin every morning.  (Patient taking differently: Inject 18 Units into the skin every morning.) 30 mL 0   • Glucose Blood (CONTOUR NEXT TEST) In Vitro Strip Check blood sugar twice a day before faizan Laterality Date   •      • CHOLECYSTECTOMY     • EGD      [facility]         Social History    Tobacco Use      Smoking status: Current Some Day Smoker        Packs/day: 0.50        Types: Cigarettes      Smokeless tobacco: Never Used      T old female who presents with upper respiratory symptoms that are consistent with    ASSESSMENT:   Suspected covid-19 virus infection  (primary encounter diagnosis)  Sore throat    PLAN:    Neg rapid strep    COVID-19 testing ordered.  Encouraged patient to

## 2021-12-28 NOTE — PATIENT INSTRUCTIONS
· It can take 3 full days for PCR COVID tests to result. · Your results will release to [employer] automatically. · Please do NOT call the clinic for results until a full 3 days have passed if no results were received.      If you are COVID positive    Sheliaa

## 2021-12-29 ENCOUNTER — APPOINTMENT (OUTPATIENT)
Dept: ENDOCRINOLOGY | Facility: HOSPITAL | Age: 41
End: 2021-12-29
Attending: NURSE PRACTITIONER
Payer: COMMERCIAL

## 2021-12-29 RX ORDER — IBUPROFEN 600 MG/1
TABLET ORAL
Qty: 30 TABLET | Refills: 0 | OUTPATIENT
Start: 2021-12-29

## 2022-01-05 RX ORDER — IBUPROFEN 600 MG/1
TABLET ORAL
Qty: 30 TABLET | Refills: 0 | OUTPATIENT
Start: 2022-01-05

## 2022-01-07 ENCOUNTER — OFFICE VISIT (OUTPATIENT)
Dept: INTERNAL MEDICINE CLINIC | Facility: CLINIC | Age: 42
End: 2022-01-07
Payer: COMMERCIAL

## 2022-01-07 VITALS
SYSTOLIC BLOOD PRESSURE: 140 MMHG | HEIGHT: 63 IN | DIASTOLIC BLOOD PRESSURE: 80 MMHG | HEART RATE: 74 BPM | BODY MASS INDEX: 28.14 KG/M2 | OXYGEN SATURATION: 100 % | WEIGHT: 158.81 LBS

## 2022-01-07 DIAGNOSIS — F17.200 SMOKING: ICD-10-CM

## 2022-01-07 DIAGNOSIS — R80.9 TYPE 2 DIABETES MELLITUS WITH MICROALBUMINURIA, WITHOUT LONG-TERM CURRENT USE OF INSULIN (HCC): Primary | ICD-10-CM

## 2022-01-07 DIAGNOSIS — I10 PRIMARY HYPERTENSION: ICD-10-CM

## 2022-01-07 DIAGNOSIS — D64.9 ANEMIA, UNSPECIFIED TYPE: ICD-10-CM

## 2022-01-07 DIAGNOSIS — Z71.6 ENCOUNTER FOR SMOKING CESSATION COUNSELING: ICD-10-CM

## 2022-01-07 DIAGNOSIS — Z02.89 ENCOUNTER FOR COMPLETION OF FORM WITH PATIENT: ICD-10-CM

## 2022-01-07 DIAGNOSIS — E11.29 TYPE 2 DIABETES MELLITUS WITH MICROALBUMINURIA, WITHOUT LONG-TERM CURRENT USE OF INSULIN (HCC): Primary | ICD-10-CM

## 2022-01-07 LAB
ALBUMIN SERPL-MCNC: 3.3 G/DL (ref 3.4–5)
ALBUMIN/GLOB SERPL: 0.9 {RATIO} (ref 1–2)
ALP LIVER SERPL-CCNC: 64 U/L
ALT SERPL-CCNC: 26 U/L
ANION GAP SERPL CALC-SCNC: 6 MMOL/L (ref 0–18)
AST SERPL-CCNC: 15 U/L (ref 15–37)
BASOPHILS # BLD AUTO: 0.03 X10(3) UL (ref 0–0.2)
BASOPHILS NFR BLD AUTO: 0.4 %
BILIRUB SERPL-MCNC: 0.3 MG/DL (ref 0.1–2)
BUN BLD-MCNC: 24 MG/DL (ref 7–18)
BUN/CREAT SERPL: 34.8 (ref 10–20)
CALCIUM BLD-MCNC: 8.9 MG/DL (ref 8.5–10.1)
CHLORIDE SERPL-SCNC: 108 MMOL/L (ref 98–112)
CHOLEST SERPL-MCNC: 195 MG/DL (ref ?–200)
CO2 SERPL-SCNC: 25 MMOL/L (ref 21–32)
CREAT BLD-MCNC: 0.69 MG/DL
DEPRECATED HBV CORE AB SER IA-ACNC: 229.9 NG/ML
DEPRECATED RDW RBC AUTO: 45.9 FL (ref 35.1–46.3)
EOSINOPHIL # BLD AUTO: 0.11 X10(3) UL (ref 0–0.7)
EOSINOPHIL NFR BLD AUTO: 1.5 %
ERYTHROCYTE [DISTWIDTH] IN BLOOD BY AUTOMATED COUNT: 13 % (ref 11–15)
EST. AVERAGE GLUCOSE BLD GHB EST-MCNC: 128 MG/DL (ref 68–126)
FASTING PATIENT LIPID ANSWER: YES
FASTING STATUS PATIENT QL REPORTED: YES
FOLATE SERPL-MCNC: 7.1 NG/ML (ref 8.7–?)
GLOBULIN PLAS-MCNC: 3.8 G/DL (ref 2.8–4.4)
GLUCOSE BLD-MCNC: 124 MG/DL (ref 70–99)
HBA1C MFR BLD: 6.1 % (ref ?–5.7)
HCT VFR BLD AUTO: 33.5 %
HDLC SERPL-MCNC: 41 MG/DL (ref 40–59)
HGB BLD-MCNC: 10.6 G/DL
IMM GRANULOCYTES # BLD AUTO: 0.01 X10(3) UL (ref 0–1)
IMM GRANULOCYTES NFR BLD: 0.1 %
IRON SATURATION: 30 %
IRON SERPL-MCNC: 98 UG/DL
LDLC SERPL CALC-MCNC: 122 MG/DL (ref ?–100)
LYMPHOCYTES # BLD AUTO: 2.11 X10(3) UL (ref 1–4)
LYMPHOCYTES NFR BLD AUTO: 27.9 %
MCH RBC QN AUTO: 30.4 PG (ref 26–34)
MCHC RBC AUTO-ENTMCNC: 31.6 G/DL (ref 31–37)
MCV RBC AUTO: 96 FL
MONOCYTES # BLD AUTO: 0.4 X10(3) UL (ref 0.1–1)
MONOCYTES NFR BLD AUTO: 5.3 %
NEUTROPHILS # BLD AUTO: 4.9 X10 (3) UL (ref 1.5–7.7)
NEUTROPHILS # BLD AUTO: 4.9 X10(3) UL (ref 1.5–7.7)
NEUTROPHILS NFR BLD AUTO: 64.8 %
NONHDLC SERPL-MCNC: 154 MG/DL (ref ?–130)
OSMOLALITY SERPL CALC.SUM OF ELEC: 293 MOSM/KG (ref 275–295)
PLATELET # BLD AUTO: 335 10(3)UL (ref 150–450)
POTASSIUM SERPL-SCNC: 4.4 MMOL/L (ref 3.5–5.1)
PROT SERPL-MCNC: 7.1 G/DL (ref 6.4–8.2)
RBC # BLD AUTO: 3.49 X10(6)UL
SODIUM SERPL-SCNC: 139 MMOL/L (ref 136–145)
T4 FREE SERPL-MCNC: 1 NG/DL (ref 0.8–1.7)
TOTAL IRON BINDING CAPACITY: 326 UG/DL (ref 240–450)
TRANSFERRIN SERPL-MCNC: 219 MG/DL (ref 200–360)
TRIGL SERPL-MCNC: 181 MG/DL (ref 30–149)
TSI SER-ACNC: 3.91 MIU/ML (ref 0.36–3.74)
VIT B12 SERPL-MCNC: 432 PG/ML (ref 193–986)
VLDLC SERPL CALC-MCNC: 32 MG/DL (ref 0–30)
WBC # BLD AUTO: 7.6 X10(3) UL (ref 4–11)

## 2022-01-07 PROCEDURE — 3044F HG A1C LEVEL LT 7.0%: CPT | Performed by: INTERNAL MEDICINE

## 2022-01-07 PROCEDURE — 80050 GENERAL HEALTH PANEL: CPT | Performed by: INTERNAL MEDICINE

## 2022-01-07 PROCEDURE — 84439 ASSAY OF FREE THYROXINE: CPT | Performed by: INTERNAL MEDICINE

## 2022-01-07 PROCEDURE — 82728 ASSAY OF FERRITIN: CPT | Performed by: INTERNAL MEDICINE

## 2022-01-07 PROCEDURE — 99214 OFFICE O/P EST MOD 30 MIN: CPT | Performed by: INTERNAL MEDICINE

## 2022-01-07 PROCEDURE — 80061 LIPID PANEL: CPT | Performed by: INTERNAL MEDICINE

## 2022-01-07 PROCEDURE — 83540 ASSAY OF IRON: CPT | Performed by: INTERNAL MEDICINE

## 2022-01-07 PROCEDURE — 82746 ASSAY OF FOLIC ACID SERUM: CPT | Performed by: INTERNAL MEDICINE

## 2022-01-07 PROCEDURE — 84466 ASSAY OF TRANSFERRIN: CPT | Performed by: INTERNAL MEDICINE

## 2022-01-07 PROCEDURE — 3008F BODY MASS INDEX DOCD: CPT | Performed by: INTERNAL MEDICINE

## 2022-01-07 PROCEDURE — 82607 VITAMIN B-12: CPT | Performed by: INTERNAL MEDICINE

## 2022-01-07 PROCEDURE — 99406 BEHAV CHNG SMOKING 3-10 MIN: CPT | Performed by: INTERNAL MEDICINE

## 2022-01-07 PROCEDURE — 3079F DIAST BP 80-89 MM HG: CPT | Performed by: INTERNAL MEDICINE

## 2022-01-07 PROCEDURE — 3077F SYST BP >= 140 MM HG: CPT | Performed by: INTERNAL MEDICINE

## 2022-01-07 PROCEDURE — 83036 HEMOGLOBIN GLYCOSYLATED A1C: CPT | Performed by: INTERNAL MEDICINE

## 2022-01-07 RX ORDER — BUPROPION HYDROCHLORIDE 150 MG/1
150 TABLET, EXTENDED RELEASE ORAL 2 TIMES DAILY
Qty: 60 TABLET | Refills: 2 | Status: SHIPPED | OUTPATIENT
Start: 2022-01-07

## 2022-01-07 NOTE — PATIENT INSTRUCTIONS
How to Quit Smoking  Smoking is a hard habit to break. About half of all people who've ever smoked have been able to quit. Most people who still smoke want to quit. Here are some of the best ways to stop smoking.      Keep in mind the health benefits of ways to change behavior, and peer support. Ask your provider for some resources. Here are some other ways to find support:   · Smokefree. gov at www.smokefree. gov  800-QUIT-NOW (542-240-7436)  · American Lung Association at www.lung.org/quit-smoking  800-58 information is not intended as a substitute for professional medical care. Always follow your healthcare professional's instructions.           The Benefits of Living Tobacco-Free  What do you want to improve in your life by quitting tobacco? Whether you sm how much you spend on tobacco each year? Fill in the blanks below to find out:  A. How much do you pay for 1 pack of cigarettes, 1 cigar, 1 pouch of pipe tobacco, or 1 can of smokeless tobacco? $________  B.  How many packs, cigars, pouches, or cans do you you feel. How to cope with your triggers  · Change the habits that lead you to smoke. For instance, if you often smoke at a morning break, go for a walk instead. · Distract yourself from smoking.  Keep your hands busy by playing with a paper clip or by do

## 2022-01-07 NOTE — PROGRESS NOTES
Rosangela Jane is a 35year old female.     Chief complaint: follow up on chronic conditions and form completion     HPI:     Rosangela Jane is a 35year old pleasant female who presents for follow up on chronic conditions and form completion     HTN apply Device 1 each by Does not apply route daily. 1 each 0   • Blood Glucose Monitoring Suppl ([address]) w/Device Does not apply Kit Test blood glucose twice daily (before breakfast and before dinner).  Diagnosis: diabetes mellitus 2, E11 Vitamin D deficiency     Type 2 diabetes mellitus with microalbuminuria, without long-term current use of insulin (HCC)     Intractable cyclical vomiting with nausea     Azotemia     Hyperglycemia     Acute cystitis without hematuria     Dehydration discussion of the benefits and risks including, but not limited to, hemorrhage and infection. After obtaining informed consent, an ultrasound fine needle aspiration biopsy of the lower pole left thyroid nodule was performed.    FINDINGS:   The biopsy proce recommended in 1 year.    Dictated by (CST): Nabeel Jimenez MD on 12/23/2021 at 2:32 PM     Finalized by (CST): Nabeel Jimenez MD on 12/23/2021 at 2:33 PM             Result Date: 12/23/2021  CONCLUSION:   Ultrasound-guided fine needle biopsy of the left lower patient  Form completed with the patient and given to patient before she left       5-smoking / smoking cessation counseling   Tobacco Cessation Documentation (Smoking and Smokeless included):    I had an in depth therapy session with Rashad roman

## 2022-01-08 PROBLEM — F17.200 SMOKING: Status: ACTIVE | Noted: 2022-01-08

## 2022-01-08 PROBLEM — D64.9 ANEMIA: Status: ACTIVE | Noted: 2022-01-08

## 2022-01-08 PROBLEM — Z71.6 ENCOUNTER FOR SMOKING CESSATION COUNSELING: Status: ACTIVE | Noted: 2022-01-08

## 2022-01-08 PROBLEM — IMO0001 SMOKING: Status: ACTIVE | Noted: 2022-01-08

## 2022-01-09 RX ORDER — ATORVASTATIN CALCIUM 40 MG/1
40 TABLET, FILM COATED ORAL NIGHTLY
Qty: 90 TABLET | Refills: 1 | Status: SHIPPED | OUTPATIENT
Start: 2022-01-09 | End: 2022-04-09

## 2022-01-09 RX ORDER — FOLIC ACID 1 MG/1
1 TABLET ORAL DAILY
Qty: 90 TABLET | Refills: 1 | Status: SHIPPED | OUTPATIENT
Start: 2022-01-09 | End: 2022-04-09

## 2022-01-11 ENCOUNTER — TELEMEDICINE (OUTPATIENT)
Dept: ENDOCRINOLOGY CLINIC | Facility: CLINIC | Age: 42
End: 2022-01-11

## 2022-01-11 DIAGNOSIS — E11.9 TYPE 2 DIABETES MELLITUS WITHOUT COMPLICATION, WITHOUT LONG-TERM CURRENT USE OF INSULIN (HCC): Primary | ICD-10-CM

## 2022-01-11 PROCEDURE — 99213 OFFICE O/P EST LOW 20 MIN: CPT | Performed by: NURSE PRACTITIONER

## 2022-01-11 NOTE — PROGRESS NOTES
This visit is conducted using Telemedicine with live, interactive video and audio. Name: King Jaison  Date: 1/11/2022    CHIEF COMPLAINT   No chief complaint on file.     HISTORY OF PRESENT ILLNESS   King Jaison is a 35year old female who p cough, or dyspnea.   Cardiovascular: Negative for:  chest pain, chest discomfort, palpitations  GI: Negative for:  abdominal pain, nausea, vomiting, diarrhea, heartburn, constipation  Neurology: Negative for: headache, dizziness, syncope, numbness/tingling, Check blood sugar twice a day before meals, Disp: 200 strip, Rfl: 0  •  Blood Glucose Monitoring Suppl (CONTOUR NEXT MONITOR) w/Device Does not apply Kit, 1 kit by Does not apply route daily. , Disp: 1 kit, Rfl: 0  •  Microlet Lancets Does not apply Misc, C tobacco: Never Used      Tobacco comment: 1 cigarette q3 days    Vaping Use      Vaping Use: Never used    Substance and Sexual Activity      Alcohol use:  Yes        Alcohol/week: 6.0 standard drinks        Types: 6 Cans of beer per week      Drug use: Yes once weekly     -reviewed option of increasing ozempic and stopping glipizide now.  Patient would prefer to work on lifestyle changes and wean off glipizide instead.   -discussed to continue with current low carbohydrate diet.   -discussed to increase exerc

## 2022-01-17 RX ORDER — GLIPIZIDE 5 MG/1
TABLET ORAL
Qty: 90 TABLET | Refills: 0 | COMMUNITY
Start: 2022-01-17 | End: 2022-01-31

## 2022-01-17 NOTE — PATIENT INSTRUCTIONS
A1C: 6.1% on 1/7/2022; decreased from 7.3% on 10/20/2021    Medications:   -continue with glipizide 2.5mg with breakfast      2.5mg with dinner   -continue with ozempic 0.5mg once weekly    A1C goal:  <7.0%    Blood sugar testing:  Continue using Freestyle

## 2022-01-24 RX ORDER — INSULIN GLARGINE 100 [IU]/ML
INJECTION, SOLUTION SUBCUTANEOUS
Qty: 30 ML | Refills: 0 | OUTPATIENT
Start: 2022-01-24

## 2022-01-24 NOTE — TELEPHONE ENCOUNTER
LOV 1/11/22    RTC in 6 months    LR N/A    Alania Taylor I believe patient was taken off insulin, can you please confirm?

## 2022-01-31 RX ORDER — SEMAGLUTIDE 1.34 MG/ML
0.5 INJECTION, SOLUTION SUBCUTANEOUS WEEKLY
Qty: 4.5 ML | Refills: 0 | Status: SHIPPED | OUTPATIENT
Start: 2022-01-31

## 2022-02-01 RX ORDER — GLIPIZIDE 5 MG/1
TABLET ORAL
Qty: 90 TABLET | Refills: 0 | Status: SHIPPED | OUTPATIENT
Start: 2022-02-01

## 2022-04-29 DIAGNOSIS — R87.619 ABNORMAL CERVICAL PAPANICOLAOU SMEAR, UNSPECIFIED ABNORMAL PAP FINDING: ICD-10-CM

## 2022-04-29 DIAGNOSIS — I10 PRIMARY HYPERTENSION: ICD-10-CM

## 2022-04-29 DIAGNOSIS — K86.9 PANCREATIC LESION: ICD-10-CM

## 2022-04-29 DIAGNOSIS — F17.200 SMOKING: ICD-10-CM

## 2022-04-29 DIAGNOSIS — Z00.00 ANNUAL PHYSICAL EXAM: ICD-10-CM

## 2022-04-29 DIAGNOSIS — R80.9 TYPE 2 DIABETES MELLITUS WITH MICROALBUMINURIA, WITHOUT LONG-TERM CURRENT USE OF INSULIN: ICD-10-CM

## 2022-04-29 DIAGNOSIS — K86.2 PANCREATIC CYST: ICD-10-CM

## 2022-04-29 DIAGNOSIS — E11.29 TYPE 2 DIABETES MELLITUS WITH MICROALBUMINURIA, WITHOUT LONG-TERM CURRENT USE OF INSULIN: ICD-10-CM

## 2022-04-29 RX ORDER — LISINOPRIL AND HYDROCHLOROTHIAZIDE 25; 20 MG/1; MG/1
1 TABLET ORAL DAILY
Qty: 90 TABLET | Refills: 0 | Status: SHIPPED | OUTPATIENT
Start: 2022-04-29 | End: 2022-07-19

## 2022-04-29 RX ORDER — FLASH GLUCOSE SENSOR
1 KIT MISCELLANEOUS
Qty: 6 EACH | Refills: 0 | Status: SHIPPED | OUTPATIENT
Start: 2022-04-29

## 2022-05-18 RX ORDER — SEMAGLUTIDE 1.34 MG/ML
0.5 INJECTION, SOLUTION SUBCUTANEOUS WEEKLY
Qty: 4.5 ML | Refills: 0 | OUTPATIENT
Start: 2022-05-18

## 2022-06-21 ENCOUNTER — TELEPHONE (OUTPATIENT)
Dept: OBGYN CLINIC | Facility: CLINIC | Age: 42
End: 2022-06-21

## 2022-06-21 NOTE — TELEPHONE ENCOUNTER
Patient called in want a nurse to call her she have questions regarding he appointment for today at 6:20 . ....... Robert Sanchez

## 2022-06-21 NOTE — TELEPHONE ENCOUNTER
Call returned to patient. Patient is 6 months s/p LEEP and due for repeat pap. Is at end of menses and still spotting. Patient elects to reschedule . Scheduled for pap on 6/27. Today's appointment cancelled. Provider notified.

## 2022-06-27 ENCOUNTER — OFFICE VISIT (OUTPATIENT)
Dept: OBGYN CLINIC | Facility: CLINIC | Age: 42
End: 2022-06-27
Payer: COMMERCIAL

## 2022-06-27 VITALS — WEIGHT: 153 LBS | DIASTOLIC BLOOD PRESSURE: 85 MMHG | SYSTOLIC BLOOD PRESSURE: 135 MMHG | BODY MASS INDEX: 27 KG/M2

## 2022-06-27 DIAGNOSIS — N87.1 CIN II (CERVICAL INTRAEPITHELIAL NEOPLASIA II): Primary | ICD-10-CM

## 2022-06-27 DIAGNOSIS — A59.01 TRICHOMONAS VAGINITIS: ICD-10-CM

## 2022-06-27 DIAGNOSIS — Z11.3 SCREENING EXAMINATION FOR STD (SEXUALLY TRANSMITTED DISEASE): ICD-10-CM

## 2022-06-27 PROCEDURE — 3075F SYST BP GE 130 - 139MM HG: CPT | Performed by: OBSTETRICS & GYNECOLOGY

## 2022-06-27 PROCEDURE — 3079F DIAST BP 80-89 MM HG: CPT | Performed by: OBSTETRICS & GYNECOLOGY

## 2022-06-27 PROCEDURE — 99214 OFFICE O/P EST MOD 30 MIN: CPT | Performed by: OBSTETRICS & GYNECOLOGY

## 2022-06-27 RX ORDER — METRONIDAZOLE 500 MG/1
500 TABLET ORAL 2 TIMES DAILY
Qty: 14 TABLET | Refills: 1 | Status: SHIPPED | OUTPATIENT
Start: 2022-06-27 | End: 2022-07-04

## 2022-06-28 LAB
C TRACH DNA SPEC QL NAA+PROBE: NEGATIVE
HPV I/H RISK 1 DNA SPEC QL NAA+PROBE: NEGATIVE
N GONORRHOEA DNA SPEC QL NAA+PROBE: NEGATIVE

## 2022-07-19 ENCOUNTER — OFFICE VISIT (OUTPATIENT)
Dept: ENDOCRINOLOGY CLINIC | Facility: CLINIC | Age: 42
End: 2022-07-19
Payer: COMMERCIAL

## 2022-07-19 VITALS
HEART RATE: 93 BPM | WEIGHT: 154 LBS | DIASTOLIC BLOOD PRESSURE: 96 MMHG | SYSTOLIC BLOOD PRESSURE: 161 MMHG | BODY MASS INDEX: 27 KG/M2

## 2022-07-19 DIAGNOSIS — I10 PRIMARY HYPERTENSION: ICD-10-CM

## 2022-07-19 DIAGNOSIS — E78.5 HYPERLIPIDEMIA, UNSPECIFIED HYPERLIPIDEMIA TYPE: ICD-10-CM

## 2022-07-19 DIAGNOSIS — E11.9 TYPE 2 DIABETES MELLITUS WITHOUT COMPLICATION, WITHOUT LONG-TERM CURRENT USE OF INSULIN (HCC): Primary | ICD-10-CM

## 2022-07-19 LAB
CARTRIDGE LOT#: ABNORMAL NUMERIC
GLUCOSE BLOOD: 138
HEMOGLOBIN A1C: 5.9 % (ref 4.3–5.6)
TEST STRIP LOT #: NORMAL NUMERIC

## 2022-07-19 PROCEDURE — 3077F SYST BP >= 140 MM HG: CPT | Performed by: NURSE PRACTITIONER

## 2022-07-19 PROCEDURE — 83036 HEMOGLOBIN GLYCOSYLATED A1C: CPT | Performed by: NURSE PRACTITIONER

## 2022-07-19 PROCEDURE — 3044F HG A1C LEVEL LT 7.0%: CPT | Performed by: PHYSICIAN ASSISTANT

## 2022-07-19 PROCEDURE — 82947 ASSAY GLUCOSE BLOOD QUANT: CPT | Performed by: NURSE PRACTITIONER

## 2022-07-19 PROCEDURE — 99214 OFFICE O/P EST MOD 30 MIN: CPT | Performed by: NURSE PRACTITIONER

## 2022-07-19 PROCEDURE — 3080F DIAST BP >= 90 MM HG: CPT | Performed by: NURSE PRACTITIONER

## 2022-07-19 RX ORDER — TELMISARTAN 20 MG/1
20 TABLET ORAL DAILY
Qty: 90 TABLET | Refills: 0 | Status: SHIPPED | OUTPATIENT
Start: 2022-07-19 | End: 2022-10-17

## 2022-07-19 RX ORDER — FLASH GLUCOSE SENSOR
1 KIT MISCELLANEOUS
Qty: 6 EACH | Refills: 1 | Status: SHIPPED | OUTPATIENT
Start: 2022-07-19

## 2022-07-19 RX ORDER — SEMAGLUTIDE 1.34 MG/ML
1 INJECTION, SOLUTION SUBCUTANEOUS WEEKLY
Qty: 9 ML | Refills: 0 | Status: SHIPPED | OUTPATIENT
Start: 2022-07-19

## 2022-07-19 NOTE — PATIENT INSTRUCTIONS
A1C: 5.9% today -->decreased from 6.1% on 1/7/2022  Blood glucose: 138 in clinic today    Medications:   -continue with ozempic 1mg once weekly   -start Telmisartan 20mg once daily in AM    Weight:  Wt Readings from Last 6 Encounters:  07/19/22 : 154 lb (69.9 kg)  06/27/22 : 153 lb (69.4 kg)  01/07/22 : 158 lb 12.8 oz (72 kg)  12/28/21 : 153 lb (69.4 kg)  12/08/21 : 155 lb (70.3 kg)  11/03/21 : 163 lb 8 oz (74.2 kg)    A1C goal:  <7.0%    BP goal:  <130/80  >90/70    Blood sugar testing:  continue using Freestyle cathleen 2 continuous glucometer     Blood sugar targets:  Before breakfast:  (preferably < 110)  2 hours after meals: <150    Call for persistent blood sugars < 75 or > 200

## 2022-07-23 ENCOUNTER — OFFICE VISIT (OUTPATIENT)
Dept: FAMILY MEDICINE CLINIC | Facility: CLINIC | Age: 42
End: 2022-07-23
Payer: COMMERCIAL

## 2022-07-23 VITALS
OXYGEN SATURATION: 98 % | TEMPERATURE: 99 F | WEIGHT: 154 LBS | HEART RATE: 74 BPM | BODY MASS INDEX: 27.29 KG/M2 | DIASTOLIC BLOOD PRESSURE: 84 MMHG | HEIGHT: 63 IN | RESPIRATION RATE: 20 BRPM | SYSTOLIC BLOOD PRESSURE: 140 MMHG

## 2022-07-23 DIAGNOSIS — H10.023 OTHER MUCOPURULENT CONJUNCTIVITIS OF BOTH EYES: Primary | ICD-10-CM

## 2022-07-23 PROCEDURE — 99213 OFFICE O/P EST LOW 20 MIN: CPT | Performed by: PHYSICIAN ASSISTANT

## 2022-07-23 PROCEDURE — 3077F SYST BP >= 140 MM HG: CPT | Performed by: PHYSICIAN ASSISTANT

## 2022-07-23 PROCEDURE — 3079F DIAST BP 80-89 MM HG: CPT | Performed by: PHYSICIAN ASSISTANT

## 2022-07-23 PROCEDURE — 3008F BODY MASS INDEX DOCD: CPT | Performed by: PHYSICIAN ASSISTANT

## 2022-07-23 RX ORDER — OFLOXACIN 3 MG/ML
1 SOLUTION/ DROPS OPHTHALMIC EVERY 4 HOURS
Qty: 5 ML | Refills: 0 | Status: SHIPPED | OUTPATIENT
Start: 2022-07-23 | End: 2022-07-30

## 2022-09-22 ENCOUNTER — HOSPITAL ENCOUNTER (OUTPATIENT)
Age: 42
Discharge: HOME | End: 2022-09-22

## 2022-09-22 VITALS
DIASTOLIC BLOOD PRESSURE: 82 MMHG | HEIGHT: 63 IN | RESPIRATION RATE: 16 BRPM | HEART RATE: 95 BPM | SYSTOLIC BLOOD PRESSURE: 153 MMHG | WEIGHT: 152 LBS | TEMPERATURE: 97 F | OXYGEN SATURATION: 100 % | BODY MASS INDEX: 26.93 KG/M2

## 2022-09-22 DIAGNOSIS — U07.1 COVID-19 VIRUS INFECTION: Primary | ICD-10-CM

## 2022-09-22 DIAGNOSIS — R03.0 ELEVATED BLOOD PRESSURE READING: ICD-10-CM

## 2022-09-22 DIAGNOSIS — Z20.822 ENCOUNTER FOR LABORATORY TESTING FOR COVID-19 VIRUS: ICD-10-CM

## 2022-09-22 LAB — SARS-COV-2 RNA RESP QL NAA+PROBE: DETECTED

## 2022-09-22 RX ORDER — NIRMATRELVIR AND RITONAVIR 300-100 MG
3 KIT ORAL 2 TIMES DAILY
Qty: 30 EACH | Refills: 0 | Status: SHIPPED | OUTPATIENT
Start: 2022-09-22 | End: 2022-09-27

## 2022-09-22 NOTE — ED INITIAL ASSESSMENT (HPI)
Pt reports nasal congestion, headache, body aches, sore throat which started yesterday.  Denies fevers

## 2022-09-29 ENCOUNTER — TELEPHONE (OUTPATIENT)
Dept: INTERNAL MEDICINE CLINIC | Facility: CLINIC | Age: 42
End: 2022-09-29

## 2022-09-29 NOTE — TELEPHONE ENCOUNTER
Patient called back. Started feeling ill before 9/22/22. Per CDC, first day allowed to return to work was: 9/28/22, but was not allowed to return to work on 9/28 due to cough which intereferes with her job duties. Day 10 will be Sunday, 10/02/22. Employer needs the [facility] papers and [facility] Reyna Nichols will not do those. She will also need a clearance to return to work note. Currently has moist cough and runny nose. Afebrile, fatigue has resolved, and headache minimal. Denies any other symptoms. Made appointment to see PCP Monday afternoon, 10/03/22. Instructed patient that she must maintain no fever for 3 days without use of medication to reduce it. If something should change, she is to call us first thing Monday morning to cancel.

## 2022-09-29 NOTE — TELEPHONE ENCOUNTER
LVM:  Was diagnosed with Covid on 9/22/22. Her employer requires an FMLA document to be completed as well as a clearance to return to work by Monday, 10/3/22.

## 2022-09-29 NOTE — TELEPHONE ENCOUNTER
[employer] is full. Cannot leave message.     ill send MyChart note instead, in reply to patient's request.

## 2022-10-03 ENCOUNTER — OFFICE VISIT (OUTPATIENT)
Dept: INTERNAL MEDICINE CLINIC | Facility: CLINIC | Age: 42
End: 2022-10-03
Payer: COMMERCIAL

## 2022-10-03 VITALS
WEIGHT: 152 LBS | RESPIRATION RATE: 17 BRPM | OXYGEN SATURATION: 99 % | HEIGHT: 63 IN | HEART RATE: 95 BPM | SYSTOLIC BLOOD PRESSURE: 146 MMHG | DIASTOLIC BLOOD PRESSURE: 86 MMHG | BODY MASS INDEX: 26.93 KG/M2

## 2022-10-03 DIAGNOSIS — I10 PRIMARY HYPERTENSION: ICD-10-CM

## 2022-10-03 DIAGNOSIS — R05.9 COUGH, UNSPECIFIED TYPE: ICD-10-CM

## 2022-10-03 DIAGNOSIS — R07.89 CHEST TIGHTNESS: ICD-10-CM

## 2022-10-03 DIAGNOSIS — U09.9 POST-COVID-19 CONDITION: Primary | ICD-10-CM

## 2022-10-03 DIAGNOSIS — R80.9 TYPE 2 DIABETES MELLITUS WITH MICROALBUMINURIA, WITHOUT LONG-TERM CURRENT USE OF INSULIN (HCC): ICD-10-CM

## 2022-10-03 DIAGNOSIS — U07.1 COVID-19: ICD-10-CM

## 2022-10-03 DIAGNOSIS — Z02.89 ENCOUNTER FOR COMPLETION OF FORM WITH PATIENT: ICD-10-CM

## 2022-10-03 DIAGNOSIS — E11.29 TYPE 2 DIABETES MELLITUS WITH MICROALBUMINURIA, WITHOUT LONG-TERM CURRENT USE OF INSULIN (HCC): ICD-10-CM

## 2022-10-03 RX ORDER — CODEINE PHOSPHATE AND GUAIFENESIN 10; 100 MG/5ML; MG/5ML
5 SOLUTION ORAL NIGHTLY PRN
Qty: 118 ML | Refills: 0 | Status: SHIPPED | OUTPATIENT
Start: 2022-10-03

## 2022-10-03 RX ORDER — ALBUTEROL SULFATE 90 UG/1
2 AEROSOL, METERED RESPIRATORY (INHALATION) EVERY 6 HOURS PRN
Qty: 6.7 G | Refills: 0 | Status: SHIPPED | OUTPATIENT
Start: 2022-10-03

## 2022-10-03 RX ORDER — FLASH GLUCOSE SENSOR
1 KIT MISCELLANEOUS
Qty: 6 EACH | Refills: 1 | Status: SHIPPED | OUTPATIENT
Start: 2022-10-03

## 2022-10-03 RX ORDER — AZITHROMYCIN 250 MG/1
TABLET, FILM COATED ORAL
Qty: 6 TABLET | Refills: 0 | Status: SHIPPED | OUTPATIENT
Start: 2022-10-03 | End: 2022-10-08

## 2022-11-08 RX ORDER — SEMAGLUTIDE 1.34 MG/ML
INJECTION, SOLUTION SUBCUTANEOUS
Qty: 9 ML | Refills: 0 | Status: SHIPPED | OUTPATIENT
Start: 2022-11-08

## 2022-11-14 ENCOUNTER — PATIENT MESSAGE (OUTPATIENT)
Dept: INTERNAL MEDICINE CLINIC | Facility: CLINIC | Age: 42
End: 2022-11-14

## 2022-11-14 DIAGNOSIS — N64.59 BREAST SYMPTOM: ICD-10-CM

## 2022-11-14 DIAGNOSIS — N64.4 BREAST PAIN: Primary | ICD-10-CM

## 2022-11-15 NOTE — TELEPHONE ENCOUNTER
Cristobal Pierce, [facility] 11/14/2022 3:01 PM CST    Please advise?    ----- Message -----  From: Jai Knight  Sent: 11/14/2022 1:45 PM CST  To: Gilles Santos Clinical Staff  Subject: Breast pain     Rolandolo Dr. Chica Blevins,   I have been feeling some pain/discomfort/ fullness in my left breast for about the past month and a half and I'm getting a bit concerned, its sometimes painful even when I'm just laying down if I lay on that side. Can you please tell me if I am due for a mammogram? and if so, if there's a specific type I should schedule?

## 2022-11-17 ENCOUNTER — OFFICE VISIT (OUTPATIENT)
Dept: FAMILY MEDICINE CLINIC | Facility: CLINIC | Age: 42
End: 2022-11-17
Payer: COMMERCIAL

## 2022-11-17 VITALS
WEIGHT: 152 LBS | OXYGEN SATURATION: 100 % | DIASTOLIC BLOOD PRESSURE: 80 MMHG | SYSTOLIC BLOOD PRESSURE: 163 MMHG | BODY MASS INDEX: 26.93 KG/M2 | HEIGHT: 63 IN | TEMPERATURE: 98 F | RESPIRATION RATE: 16 BRPM | HEART RATE: 89 BPM

## 2022-11-17 DIAGNOSIS — J06.9 VIRAL URI WITH COUGH: Primary | ICD-10-CM

## 2022-11-17 DIAGNOSIS — Z20.828 EXPOSURE TO INFLUENZA: ICD-10-CM

## 2022-11-17 DIAGNOSIS — R03.0 ELEVATED BLOOD PRESSURE READING: ICD-10-CM

## 2022-11-17 PROCEDURE — 3077F SYST BP >= 140 MM HG: CPT | Performed by: PHYSICIAN ASSISTANT

## 2022-11-17 PROCEDURE — 3008F BODY MASS INDEX DOCD: CPT | Performed by: PHYSICIAN ASSISTANT

## 2022-11-17 PROCEDURE — 3079F DIAST BP 80-89 MM HG: CPT | Performed by: PHYSICIAN ASSISTANT

## 2022-11-17 PROCEDURE — 87637 SARSCOV2&INF A&B&RSV AMP PRB: CPT | Performed by: PHYSICIAN ASSISTANT

## 2022-11-17 PROCEDURE — 99213 OFFICE O/P EST LOW 20 MIN: CPT | Performed by: PHYSICIAN ASSISTANT

## 2022-11-17 RX ORDER — OSELTAMIVIR PHOSPHATE 75 MG/1
75 CAPSULE ORAL 2 TIMES DAILY
Qty: 10 CAPSULE | Refills: 0 | Status: SHIPPED | OUTPATIENT
Start: 2022-11-17 | End: 2022-11-22

## 2022-11-18 LAB
FLUAV + FLUBV RNA SPEC NAA+PROBE: DETECTED
FLUAV + FLUBV RNA SPEC NAA+PROBE: NOT DETECTED
RSV RNA SPEC NAA+PROBE: NOT DETECTED
SARS-COV-2 RNA RESP QL NAA+PROBE: NOT DETECTED

## 2022-11-30 ENCOUNTER — HOSPITAL ENCOUNTER (OUTPATIENT)
Dept: MAMMOGRAPHY | Facility: HOSPITAL | Age: 42
Discharge: HOME | End: 2022-11-30
Attending: INTERNAL MEDICINE
Payer: COMMERCIAL

## 2022-11-30 DIAGNOSIS — N64.59 BREAST SYMPTOM: ICD-10-CM

## 2022-11-30 DIAGNOSIS — N64.4 BREAST PAIN: ICD-10-CM

## 2022-11-30 PROCEDURE — 77066 DX MAMMO INCL CAD BI: CPT | Performed by: INTERNAL MEDICINE

## 2022-11-30 PROCEDURE — 77062 BREAST TOMOSYNTHESIS BI: CPT | Performed by: INTERNAL MEDICINE

## 2023-01-24 ENCOUNTER — OFFICE VISIT (OUTPATIENT)
Dept: ENDOCRINOLOGY CLINIC | Facility: CLINIC | Age: 43
End: 2023-01-24

## 2023-01-24 VITALS
WEIGHT: 160 LBS | HEART RATE: 97 BPM | BODY MASS INDEX: 28 KG/M2 | SYSTOLIC BLOOD PRESSURE: 168 MMHG | DIASTOLIC BLOOD PRESSURE: 90 MMHG

## 2023-01-24 DIAGNOSIS — I10 PRIMARY HYPERTENSION: ICD-10-CM

## 2023-01-24 DIAGNOSIS — E78.5 HYPERLIPIDEMIA, UNSPECIFIED HYPERLIPIDEMIA TYPE: ICD-10-CM

## 2023-01-24 DIAGNOSIS — E11.9 TYPE 2 DIABETES MELLITUS WITHOUT COMPLICATION, WITHOUT LONG-TERM CURRENT USE OF INSULIN (HCC): Primary | ICD-10-CM

## 2023-01-24 LAB
CARTRIDGE LOT#: NORMAL NUMERIC
GLUCOSE BLOOD: 126
HEMOGLOBIN A1C: 5.5 % (ref 4.3–5.6)
TEST STRIP LOT #: NORMAL NUMERIC

## 2023-01-24 PROCEDURE — 83036 HEMOGLOBIN GLYCOSYLATED A1C: CPT | Performed by: NURSE PRACTITIONER

## 2023-01-24 PROCEDURE — 3077F SYST BP >= 140 MM HG: CPT | Performed by: NURSE PRACTITIONER

## 2023-01-24 PROCEDURE — 3044F HG A1C LEVEL LT 7.0%: CPT | Performed by: NURSE PRACTITIONER

## 2023-01-24 PROCEDURE — 82947 ASSAY GLUCOSE BLOOD QUANT: CPT | Performed by: NURSE PRACTITIONER

## 2023-01-24 PROCEDURE — 99214 OFFICE O/P EST MOD 30 MIN: CPT | Performed by: NURSE PRACTITIONER

## 2023-01-24 PROCEDURE — 3080F DIAST BP >= 90 MM HG: CPT | Performed by: NURSE PRACTITIONER

## 2023-01-24 PROCEDURE — 95251 CONT GLUC MNTR ANALYSIS I&R: CPT | Performed by: NURSE PRACTITIONER

## 2023-01-24 NOTE — PATIENT INSTRUCTIONS
A1C: 5.5% today -->decreased from 5.9% on 7/19/2022  Blood glucose: 126 in clinic today    Medications:   - increase Ozempic 2mg once weekly on Sunday    -Repeat labs at your convenience.  Please have these drawn as fasting for 10hrs    Weight:  Wt Readings from Last 6 Encounters:  01/24/23 : 160 lb (72.6 kg)  11/17/22 : 152 lb (68.9 kg)  10/03/22 : 152 lb (68.9 kg)  09/22/22 : 152 lb (68.9 kg)  07/23/22 : 154 lb (69.9 kg)  07/19/22 : 154 lb (69.9 kg)    A1C goal:  <6.5%    Blood sugar testing:  Continue with Freestyle cathleen continuous glucometer     Blood sugar targets:  Before breakfast:  (preferably < 110)  2 hours after meals: <150    Call for persistent blood sugars < 75 or > 200

## 2023-01-31 ENCOUNTER — TELEPHONE (OUTPATIENT)
Dept: ENDOCRINOLOGY CLINIC | Facility: CLINIC | Age: 43
End: 2023-01-31

## 2023-01-31 NOTE — TELEPHONE ENCOUNTER
Received fax requesting PA for ozempic. Will route to PA department for further assistance. Patient is taking ozempic 2mg once weekly. Thank you!

## 2023-02-07 NOTE — TELEPHONE ENCOUNTER
Called [facility] [phone], spoke with Balbina Ruiz. She states pa for Ozempic was approved until 2/2/2024, Orin Kapoor # [id number]  Call ref# Quincy Montelongo [id number]    Call [facility], [phone], spoke with Rafael Ibarra. She states went through.      My chart message sent to patient of approval.

## 2023-02-21 ENCOUNTER — OFFICE VISIT (OUTPATIENT)
Dept: INTERNAL MEDICINE CLINIC | Facility: CLINIC | Age: 43
End: 2023-02-21
Payer: COMMERCIAL

## 2023-02-21 VITALS
HEIGHT: 63 IN | HEART RATE: 78 BPM | SYSTOLIC BLOOD PRESSURE: 120 MMHG | WEIGHT: 158.81 LBS | BODY MASS INDEX: 28.14 KG/M2 | OXYGEN SATURATION: 100 % | DIASTOLIC BLOOD PRESSURE: 84 MMHG

## 2023-02-21 DIAGNOSIS — K86.9 PANCREATIC LESION: ICD-10-CM

## 2023-02-21 DIAGNOSIS — E55.9 VITAMIN D DEFICIENCY: ICD-10-CM

## 2023-02-21 DIAGNOSIS — E11.29 TYPE 2 DIABETES MELLITUS WITH MICROALBUMINURIA, WITHOUT LONG-TERM CURRENT USE OF INSULIN (HCC): ICD-10-CM

## 2023-02-21 DIAGNOSIS — R80.9 TYPE 2 DIABETES MELLITUS WITH MICROALBUMINURIA, WITHOUT LONG-TERM CURRENT USE OF INSULIN (HCC): ICD-10-CM

## 2023-02-21 DIAGNOSIS — E04.1 THYROID NODULE: ICD-10-CM

## 2023-02-21 DIAGNOSIS — Z00.00 ANNUAL PHYSICAL EXAM: Primary | ICD-10-CM

## 2023-02-21 DIAGNOSIS — Z71.6 ENCOUNTER FOR SMOKING CESSATION COUNSELING: ICD-10-CM

## 2023-02-21 DIAGNOSIS — F17.200 SMOKING: ICD-10-CM

## 2023-02-21 LAB
ALBUMIN SERPL-MCNC: 3 G/DL (ref 3.4–5)
ALBUMIN/GLOB SERPL: 0.7 {RATIO} (ref 1–2)
ALP LIVER SERPL-CCNC: 60 U/L
ALT SERPL-CCNC: 29 U/L
ANION GAP SERPL CALC-SCNC: 7 MMOL/L (ref 0–18)
AST SERPL-CCNC: 21 U/L (ref 15–37)
BASOPHILS # BLD AUTO: 0.04 X10(3) UL (ref 0–0.2)
BASOPHILS NFR BLD AUTO: 0.6 %
BILIRUB SERPL-MCNC: 0.5 MG/DL (ref 0.1–2)
BUN BLD-MCNC: 15 MG/DL (ref 7–18)
BUN/CREAT SERPL: 21.1 (ref 10–20)
CALCIUM BLD-MCNC: 8.9 MG/DL (ref 8.5–10.1)
CHLORIDE SERPL-SCNC: 107 MMOL/L (ref 98–112)
CHOLEST SERPL-MCNC: 224 MG/DL (ref ?–200)
CO2 SERPL-SCNC: 26 MMOL/L (ref 21–32)
CREAT BLD-MCNC: 0.71 MG/DL
CREAT UR-SCNC: 177 MG/DL
DEPRECATED RDW RBC AUTO: 44.6 FL (ref 35.1–46.3)
EOSINOPHIL # BLD AUTO: 0.11 X10(3) UL (ref 0–0.7)
EOSINOPHIL NFR BLD AUTO: 1.7 %
ERYTHROCYTE [DISTWIDTH] IN BLOOD BY AUTOMATED COUNT: 13.2 % (ref 11–15)
FASTING PATIENT LIPID ANSWER: YES
FASTING STATUS PATIENT QL REPORTED: YES
GFR SERPLBLD BASED ON 1.73 SQ M-ARVRAT: 107 ML/MIN/1.73M2 (ref 60–?)
GLOBULIN PLAS-MCNC: 4.2 G/DL (ref 2.8–4.4)
GLUCOSE BLD-MCNC: 108 MG/DL (ref 70–99)
HCT VFR BLD AUTO: 37.4 %
HDLC SERPL-MCNC: 75 MG/DL (ref 40–59)
HGB BLD-MCNC: 12.6 G/DL
IMM GRANULOCYTES # BLD AUTO: 0.02 X10(3) UL (ref 0–1)
IMM GRANULOCYTES NFR BLD: 0.3 %
LDLC SERPL CALC-MCNC: 122 MG/DL (ref ?–100)
LYMPHOCYTES # BLD AUTO: 1.82 X10(3) UL (ref 1–4)
LYMPHOCYTES NFR BLD AUTO: 28.1 %
MCH RBC QN AUTO: 31.2 PG (ref 26–34)
MCHC RBC AUTO-ENTMCNC: 33.7 G/DL (ref 31–37)
MCV RBC AUTO: 92.6 FL
MICROALBUMIN UR-MCNC: 336 MG/DL
MICROALBUMIN/CREAT 24H UR-RTO: 1898.3 UG/MG (ref ?–30)
MONOCYTES # BLD AUTO: 0.33 X10(3) UL (ref 0.1–1)
MONOCYTES NFR BLD AUTO: 5.1 %
NEUTROPHILS # BLD AUTO: 4.16 X10 (3) UL (ref 1.5–7.7)
NEUTROPHILS # BLD AUTO: 4.16 X10(3) UL (ref 1.5–7.7)
NEUTROPHILS NFR BLD AUTO: 64.2 %
NONHDLC SERPL-MCNC: 149 MG/DL (ref ?–130)
OSMOLALITY SERPL CALC.SUM OF ELEC: 291 MOSM/KG (ref 275–295)
PLATELET # BLD AUTO: 311 10(3)UL (ref 150–450)
POTASSIUM SERPL-SCNC: 4 MMOL/L (ref 3.5–5.1)
PROT SERPL-MCNC: 7.2 G/DL (ref 6.4–8.2)
RBC # BLD AUTO: 4.04 X10(6)UL
SODIUM SERPL-SCNC: 140 MMOL/L (ref 136–145)
TRIGL SERPL-MCNC: 154 MG/DL (ref 30–149)
TSI SER-ACNC: 2.48 MIU/ML (ref 0.36–3.74)
VIT D+METAB SERPL-MCNC: 17.2 NG/ML (ref 30–100)
VLDLC SERPL CALC-MCNC: 27 MG/DL (ref 0–30)
WBC # BLD AUTO: 6.5 X10(3) UL (ref 4–11)

## 2023-02-21 PROCEDURE — 99396 PREV VISIT EST AGE 40-64: CPT | Performed by: INTERNAL MEDICINE

## 2023-02-21 PROCEDURE — 80061 LIPID PANEL: CPT | Performed by: INTERNAL MEDICINE

## 2023-02-21 PROCEDURE — 3079F DIAST BP 80-89 MM HG: CPT | Performed by: INTERNAL MEDICINE

## 2023-02-21 PROCEDURE — 3060F POS MICROALBUMINURIA REV: CPT | Performed by: INTERNAL MEDICINE

## 2023-02-21 PROCEDURE — 82570 ASSAY OF URINE CREATININE: CPT | Performed by: INTERNAL MEDICINE

## 2023-02-21 PROCEDURE — 3008F BODY MASS INDEX DOCD: CPT | Performed by: INTERNAL MEDICINE

## 2023-02-21 PROCEDURE — 82306 VITAMIN D 25 HYDROXY: CPT | Performed by: INTERNAL MEDICINE

## 2023-02-21 PROCEDURE — 82043 UR ALBUMIN QUANTITATIVE: CPT | Performed by: INTERNAL MEDICINE

## 2023-02-21 PROCEDURE — 99406 BEHAV CHNG SMOKING 3-10 MIN: CPT | Performed by: INTERNAL MEDICINE

## 2023-02-21 PROCEDURE — 3074F SYST BP LT 130 MM HG: CPT | Performed by: INTERNAL MEDICINE

## 2023-02-21 PROCEDURE — 80050 GENERAL HEALTH PANEL: CPT | Performed by: INTERNAL MEDICINE

## 2023-02-21 RX ORDER — OSELTAMIVIR PHOSPHATE 75 MG/1
CAPSULE ORAL
COMMUNITY
Start: 2022-11-18

## 2023-02-22 ENCOUNTER — TELEPHONE (OUTPATIENT)
Dept: ENDOCRINOLOGY CLINIC | Facility: CLINIC | Age: 43
End: 2023-02-22

## 2023-02-22 DIAGNOSIS — E11.29 TYPE 2 DIABETES MELLITUS WITH MICROALBUMINURIA, WITHOUT LONG-TERM CURRENT USE OF INSULIN (HCC): Primary | ICD-10-CM

## 2023-02-22 DIAGNOSIS — R80.9 TYPE 2 DIABETES MELLITUS WITH MICROALBUMINURIA, WITHOUT LONG-TERM CURRENT USE OF INSULIN (HCC): Primary | ICD-10-CM

## 2023-02-22 RX ORDER — ERGOCALCIFEROL 1.25 MG/1
50000 CAPSULE ORAL WEEKLY
Qty: 12 CAPSULE | Refills: 1 | Status: SHIPPED | OUTPATIENT
Start: 2023-02-22 | End: 2023-05-11

## 2023-02-22 RX ORDER — ROSUVASTATIN CALCIUM 10 MG/1
10 TABLET, COATED ORAL NIGHTLY
Qty: 90 TABLET | Refills: 1 | Status: SHIPPED | OUTPATIENT
Start: 2023-02-22 | End: 2023-05-23

## 2023-03-13 RX ORDER — SEMAGLUTIDE 2.68 MG/ML
2 INJECTION, SOLUTION SUBCUTANEOUS WEEKLY
Qty: 9 ML | Refills: 1 | Status: SHIPPED | OUTPATIENT
Start: 2023-03-13

## 2023-03-15 ENCOUNTER — HOSPITAL ENCOUNTER (OUTPATIENT)
Dept: ULTRASOUND IMAGING | Facility: HOSPITAL | Age: 43
Discharge: HOME | End: 2023-03-15
Attending: INTERNAL MEDICINE
Payer: COMMERCIAL

## 2023-03-15 ENCOUNTER — HOSPITAL ENCOUNTER (OUTPATIENT)
Dept: MRI IMAGING | Facility: HOSPITAL | Age: 43
Discharge: HOME | End: 2023-03-15
Attending: INTERNAL MEDICINE
Payer: COMMERCIAL

## 2023-03-15 DIAGNOSIS — Z71.6 ENCOUNTER FOR SMOKING CESSATION COUNSELING: ICD-10-CM

## 2023-03-15 DIAGNOSIS — E04.1 THYROID NODULE: ICD-10-CM

## 2023-03-15 DIAGNOSIS — E11.29 TYPE 2 DIABETES MELLITUS WITH MICROALBUMINURIA, WITHOUT LONG-TERM CURRENT USE OF INSULIN (HCC): ICD-10-CM

## 2023-03-15 DIAGNOSIS — E55.9 VITAMIN D DEFICIENCY: ICD-10-CM

## 2023-03-15 DIAGNOSIS — Z00.00 ANNUAL PHYSICAL EXAM: ICD-10-CM

## 2023-03-15 DIAGNOSIS — K86.9 PANCREATIC LESION: ICD-10-CM

## 2023-03-15 DIAGNOSIS — R80.9 TYPE 2 DIABETES MELLITUS WITH MICROALBUMINURIA, WITHOUT LONG-TERM CURRENT USE OF INSULIN (HCC): ICD-10-CM

## 2023-03-15 DIAGNOSIS — F17.200 SMOKING: ICD-10-CM

## 2023-03-15 PROCEDURE — 76536 US EXAM OF HEAD AND NECK: CPT | Performed by: INTERNAL MEDICINE

## 2023-03-15 PROCEDURE — 74183 MRI ABD W/O CNTR FLWD CNTR: CPT | Performed by: INTERNAL MEDICINE

## 2023-03-15 PROCEDURE — A9575 INJ GADOTERATE MEGLUMI 0.1ML: HCPCS | Performed by: INTERNAL MEDICINE

## 2023-03-15 RX ORDER — GADOTERATE MEGLUMINE 376.9 MG/ML
15 INJECTION INTRAVENOUS
Status: COMPLETED | OUTPATIENT
Start: 2023-03-15 | End: 2023-03-15

## 2023-03-15 RX ADMIN — GADOTERATE MEGLUMINE 15 ML: 376.9 INJECTION INTRAVENOUS at 17:35:00

## 2023-03-20 DIAGNOSIS — N28.1 ACQUIRED COMPLEX CYST OF KIDNEY: Primary | ICD-10-CM

## 2023-05-17 ENCOUNTER — PATIENT MESSAGE (OUTPATIENT)
Dept: ENDOCRINOLOGY CLINIC | Facility: CLINIC | Age: 43
End: 2023-05-17

## 2023-05-17 RX ORDER — TELMISARTAN 20 MG/1
20 TABLET ORAL DAILY
Qty: 90 TABLET | Refills: 0 | Status: SHIPPED | OUTPATIENT
Start: 2023-05-17

## 2023-08-01 ENCOUNTER — OFFICE VISIT (OUTPATIENT)
Dept: ENDOCRINOLOGY CLINIC | Facility: CLINIC | Age: 43
End: 2023-08-01

## 2023-08-01 VITALS
BODY MASS INDEX: 28 KG/M2 | HEART RATE: 86 BPM | SYSTOLIC BLOOD PRESSURE: 142 MMHG | DIASTOLIC BLOOD PRESSURE: 82 MMHG | WEIGHT: 156 LBS

## 2023-08-01 DIAGNOSIS — E11.29 TYPE 2 DIABETES MELLITUS WITH MICROALBUMINURIA, WITHOUT LONG-TERM CURRENT USE OF INSULIN: Primary | ICD-10-CM

## 2023-08-01 DIAGNOSIS — R80.9 TYPE 2 DIABETES MELLITUS WITH MICROALBUMINURIA, WITHOUT LONG-TERM CURRENT USE OF INSULIN: Primary | ICD-10-CM

## 2023-08-01 DIAGNOSIS — I10 PRIMARY HYPERTENSION: ICD-10-CM

## 2023-08-01 LAB
CARTRIDGE LOT#: NORMAL NUMERIC
GLUCOSE BLOOD: 127
HEMOGLOBIN A1C: 5.4 % (ref 4.3–5.6)
TEST STRIP LOT #: NORMAL NUMERIC

## 2023-08-01 PROCEDURE — 83036 HEMOGLOBIN GLYCOSYLATED A1C: CPT | Performed by: NURSE PRACTITIONER

## 2023-08-01 PROCEDURE — 99214 OFFICE O/P EST MOD 30 MIN: CPT | Performed by: NURSE PRACTITIONER

## 2023-08-01 PROCEDURE — 3044F HG A1C LEVEL LT 7.0%: CPT | Performed by: NURSE PRACTITIONER

## 2023-08-01 PROCEDURE — 3077F SYST BP >= 140 MM HG: CPT | Performed by: NURSE PRACTITIONER

## 2023-08-01 PROCEDURE — 82947 ASSAY GLUCOSE BLOOD QUANT: CPT | Performed by: NURSE PRACTITIONER

## 2023-08-01 PROCEDURE — 3079F DIAST BP 80-89 MM HG: CPT | Performed by: NURSE PRACTITIONER

## 2023-08-01 RX ORDER — TELMISARTAN 40 MG/1
40 TABLET ORAL DAILY
Qty: 90 TABLET | Refills: 0 | Status: SHIPPED | OUTPATIENT
Start: 2023-08-01

## 2023-08-01 NOTE — PATIENT INSTRUCTIONS
A1C: 5.4% today --> stable from 5.5% on 1/24/2023  Blood glucose: 127 in clinic today    Medications:   - continue with Ozempic 2mg once weekly   -increase Telmisartan 20-->40mg once daily     - continue to follow a low carb diet  - continue to stay active as currently doing       Weight:  Wt Readings from Last 6 Encounters:  08/01/23 : 156 lb (70.8 kg)  02/21/23 : 158 lb 12.8 oz (72 kg)  01/24/23 : 160 lb (72.6 kg)  11/17/22 : 152 lb (68.9 kg)  10/03/22 : 152 lb (68.9 kg)  09/22/22 : 152 lb (68.9 kg)    A1C goal:  <6.5%    BP goal:  <130/80    Blood sugar testing:  Test your blood sugar 1 time daily   Recommended times to test: alternate with before breakfast (fasting) or 2hrs after meals     Blood sugar targets:  Before breakfast:   (preferably < 110)  2 hours after meals: <150     Call for persistent blood sugars < 75 or > 200

## 2023-08-21 RX ORDER — SEMAGLUTIDE 2.68 MG/ML
2 INJECTION, SOLUTION SUBCUTANEOUS WEEKLY
Qty: 9 ML | Refills: 1 | Status: CANCELLED | OUTPATIENT
Start: 2023-08-21

## 2023-08-21 RX ORDER — SEMAGLUTIDE 2.68 MG/ML
2 INJECTION, SOLUTION SUBCUTANEOUS WEEKLY
Qty: 9 ML | Refills: 1 | Status: SHIPPED | OUTPATIENT
Start: 2023-08-21

## 2023-08-21 NOTE — TELEPHONE ENCOUNTER
LOV: 8/1/2023    RTC: 2 Months    FU: 11/7/2023 @ 4:00 pm     Last Refill: 3/13/2023    3 Month Supply Pending

## 2023-09-09 ENCOUNTER — TELEPHONE (OUTPATIENT)
Dept: ENDOCRINOLOGY CLINIC | Facility: CLINIC | Age: 43
End: 2023-09-09

## 2023-09-09 NOTE — TELEPHONE ENCOUNTER
Current Outpatient Medications   Medication Sig Dispense Refill    semaglutide (OZEMPIC, 2 MG/DOSE,) 8 MG/3ML Subcutaneous Solution Pen-injector Inject 2 mg into the skin once a week.  9 mL 1     Per pharmacy prior auth required  Key: [id number]

## 2023-09-11 NOTE — TELEPHONE ENCOUNTER
No PA needed via [employer]: This medication or product was previously approved on [id number] from 2023-02-02 to 2024-02-02. **Please note: This request was submitted electronically. Formulary lowering, tiering exception, cost reduction and/or pre-benefit determination review (including prospective Medicare hospice reviews) requests cannot be requested using this method of submission. Providers contact us at [phone] for further assistance.

## 2023-10-05 NOTE — PLAN OF CARE
A/Ox4. Fall risk precautions appropriate for pt: bed in lowest position, call light within reach, non-skid socks. IVF at 100cc/hr. Alarm para meters appropriate for pt: bed/chair alarm on. Elevated POC this am, MD contacted.   Orders for transfer in [address] [place] [address] additional Care Plan goals for specific interventions  8/22/2021 1051 by Dariel Pacheco RN  Outcome: Progressing  8/22/2021 1050 by Dariel Pacheco RN  Outcome: Progressing  8/22/2021 1049 by Dariel Pacheco RN  Outcome: Adequate for Discharge  Goal RN  Outcome: Adequate for Discharge  Goal: Maintains adequate nutritional intake (undernourished)  Description: INTERVENTIONS:  - Monitor percentage of each meal consumed  - Identify factors contributing to decreased intake, treat as appropriate  - Assist (4) rarely moist

## 2023-10-28 ENCOUNTER — OFFICE VISIT (OUTPATIENT)
Dept: INTERNAL MEDICINE CLINIC | Facility: CLINIC | Age: 43
End: 2023-10-28

## 2023-10-28 VITALS
OXYGEN SATURATION: 99 % | HEART RATE: 83 BPM | BODY MASS INDEX: 27.64 KG/M2 | SYSTOLIC BLOOD PRESSURE: 140 MMHG | DIASTOLIC BLOOD PRESSURE: 80 MMHG | WEIGHT: 156 LBS | HEIGHT: 63 IN

## 2023-10-28 DIAGNOSIS — Z12.11 SCREENING FOR COLON CANCER: ICD-10-CM

## 2023-10-28 DIAGNOSIS — E11.29 TYPE 2 DIABETES MELLITUS WITH MICROALBUMINURIA, WITHOUT LONG-TERM CURRENT USE OF INSULIN: ICD-10-CM

## 2023-10-28 DIAGNOSIS — K86.9 PANCREATIC LESION: ICD-10-CM

## 2023-10-28 DIAGNOSIS — M25.551 DISCOMFORT OF RIGHT HIP: ICD-10-CM

## 2023-10-28 DIAGNOSIS — I10 PRIMARY HYPERTENSION: ICD-10-CM

## 2023-10-28 DIAGNOSIS — R80.9 TYPE 2 DIABETES MELLITUS WITH MICROALBUMINURIA, WITHOUT LONG-TERM CURRENT USE OF INSULIN: ICD-10-CM

## 2023-10-28 DIAGNOSIS — R23.2 HOT FLASHES: Primary | ICD-10-CM

## 2023-10-28 DIAGNOSIS — Z12.31 ENCOUNTER FOR SCREENING MAMMOGRAM FOR MALIGNANT NEOPLASM OF BREAST: ICD-10-CM

## 2023-10-28 DIAGNOSIS — N92.6 IRREGULAR PERIODS: ICD-10-CM

## 2023-10-28 PROCEDURE — 3008F BODY MASS INDEX DOCD: CPT | Performed by: INTERNAL MEDICINE

## 2023-10-28 PROCEDURE — 99214 OFFICE O/P EST MOD 30 MIN: CPT | Performed by: INTERNAL MEDICINE

## 2023-10-28 PROCEDURE — 3077F SYST BP >= 140 MM HG: CPT | Performed by: INTERNAL MEDICINE

## 2023-10-28 PROCEDURE — 3079F DIAST BP 80-89 MM HG: CPT | Performed by: INTERNAL MEDICINE

## 2023-12-09 ENCOUNTER — LAB ENCOUNTER (OUTPATIENT)
Dept: LAB | Age: 43
End: 2023-12-09
Attending: INTERNAL MEDICINE
Payer: COMMERCIAL

## 2023-12-09 ENCOUNTER — HOSPITAL ENCOUNTER (OUTPATIENT)
Dept: ULTRASOUND IMAGING | Age: 43
Discharge: HOME | End: 2023-12-09
Attending: INTERNAL MEDICINE
Payer: COMMERCIAL

## 2023-12-09 ENCOUNTER — HOSPITAL ENCOUNTER (OUTPATIENT)
Dept: GENERAL RADIOLOGY | Age: 43
Discharge: HOME | End: 2023-12-09
Attending: INTERNAL MEDICINE
Payer: COMMERCIAL

## 2023-12-09 DIAGNOSIS — Z12.11 SCREENING FOR COLON CANCER: ICD-10-CM

## 2023-12-09 DIAGNOSIS — Z12.31 ENCOUNTER FOR SCREENING MAMMOGRAM FOR MALIGNANT NEOPLASM OF BREAST: ICD-10-CM

## 2023-12-09 DIAGNOSIS — N92.6 IRREGULAR PERIODS: ICD-10-CM

## 2023-12-09 DIAGNOSIS — K86.9 PANCREATIC LESION: ICD-10-CM

## 2023-12-09 DIAGNOSIS — R23.2 HOT FLASHES: ICD-10-CM

## 2023-12-09 DIAGNOSIS — I10 PRIMARY HYPERTENSION: ICD-10-CM

## 2023-12-09 DIAGNOSIS — R80.9 TYPE 2 DIABETES MELLITUS WITH MICROALBUMINURIA, WITHOUT LONG-TERM CURRENT USE OF INSULIN  (HCC): ICD-10-CM

## 2023-12-09 DIAGNOSIS — E11.29 TYPE 2 DIABETES MELLITUS WITH MICROALBUMINURIA, WITHOUT LONG-TERM CURRENT USE OF INSULIN  (HCC): ICD-10-CM

## 2023-12-09 DIAGNOSIS — N28.1 ACQUIRED COMPLEX CYST OF KIDNEY: ICD-10-CM

## 2023-12-09 DIAGNOSIS — M25.551 DISCOMFORT OF RIGHT HIP: ICD-10-CM

## 2023-12-09 LAB
ALBUMIN SERPL-MCNC: 3.9 G/DL (ref 3.2–4.8)
ALBUMIN/GLOB SERPL: 1.2 {RATIO} (ref 1–2)
ALP LIVER SERPL-CCNC: 66 U/L
ALT SERPL-CCNC: 12 U/L
ANION GAP SERPL CALC-SCNC: 6 MMOL/L (ref 0–18)
AST SERPL-CCNC: 14 U/L (ref ?–34)
BASOPHILS # BLD AUTO: 0.03 X10(3) UL (ref 0–0.2)
BASOPHILS NFR BLD AUTO: 0.4 %
BILIRUB SERPL-MCNC: 0.7 MG/DL (ref 0.3–1.2)
BILIRUB UR QL: NEGATIVE
BUN BLD-MCNC: 15 MG/DL (ref 9–23)
BUN/CREAT SERPL: 20.5 (ref 10–20)
CALCIUM BLD-MCNC: 9.3 MG/DL (ref 8.7–10.4)
CHLORIDE SERPL-SCNC: 105 MMOL/L (ref 98–112)
CHOLEST SERPL-MCNC: 206 MG/DL (ref ?–200)
CLARITY UR: CLEAR
CO2 SERPL-SCNC: 26 MMOL/L (ref 21–32)
CREAT BLD-MCNC: 0.73 MG/DL
CREAT UR-SCNC: 102 MG/DL
DEPRECATED RDW RBC AUTO: 40.8 FL (ref 35.1–46.3)
EGFRCR SERPLBLD CKD-EPI 2021: 103 ML/MIN/1.73M2 (ref 60–?)
EOSINOPHIL # BLD AUTO: 0.1 X10(3) UL (ref 0–0.7)
EOSINOPHIL NFR BLD AUTO: 1.5 %
ERYTHROCYTE [DISTWIDTH] IN BLOOD BY AUTOMATED COUNT: 12.4 % (ref 11–15)
FASTING PATIENT LIPID ANSWER: YES
FASTING STATUS PATIENT QL REPORTED: YES
FSH SERPL-ACNC: 21.4 MIU/ML
GLOBULIN PLAS-MCNC: 3.3 G/DL (ref 2.8–4.4)
GLUCOSE BLD-MCNC: 108 MG/DL (ref 70–99)
GLUCOSE UR-MCNC: NORMAL MG/DL
HCT VFR BLD AUTO: 34.2 %
HDLC SERPL-MCNC: 64 MG/DL (ref 40–59)
HGB BLD-MCNC: 11.9 G/DL
HGB UR QL STRIP.AUTO: NEGATIVE
HYALINE CASTS #/AREA URNS AUTO: PRESENT /LPF
IMM GRANULOCYTES # BLD AUTO: 0.02 X10(3) UL (ref 0–1)
IMM GRANULOCYTES NFR BLD: 0.3 %
KETONES UR-MCNC: NEGATIVE MG/DL
LDLC SERPL CALC-MCNC: 127 MG/DL (ref ?–100)
LEUKOCYTE ESTERASE UR QL STRIP.AUTO: NEGATIVE
LYMPHOCYTES # BLD AUTO: 1.92 X10(3) UL (ref 1–4)
LYMPHOCYTES NFR BLD AUTO: 28.4 %
MCH RBC QN AUTO: 31.5 PG (ref 26–34)
MCHC RBC AUTO-ENTMCNC: 34.8 G/DL (ref 31–37)
MCV RBC AUTO: 90.5 FL
MICROALBUMIN UR-MCNC: 132.6 MG/DL
MICROALBUMIN/CREAT 24H UR-RTO: 1300 UG/MG (ref ?–30)
MONOCYTES # BLD AUTO: 0.34 X10(3) UL (ref 0.1–1)
MONOCYTES NFR BLD AUTO: 5 %
NEUTROPHILS # BLD AUTO: 4.35 X10 (3) UL (ref 1.5–7.7)
NEUTROPHILS # BLD AUTO: 4.35 X10(3) UL (ref 1.5–7.7)
NEUTROPHILS NFR BLD AUTO: 64.4 %
NITRITE UR QL STRIP.AUTO: NEGATIVE
NONHDLC SERPL-MCNC: 142 MG/DL (ref ?–130)
OSMOLALITY SERPL CALC.SUM OF ELEC: 285 MOSM/KG (ref 275–295)
PH UR: 6.5 [PH] (ref 5–8)
PLATELET # BLD AUTO: 312 10(3)UL (ref 150–450)
POTASSIUM SERPL-SCNC: 4.6 MMOL/L (ref 3.5–5.1)
PROT SERPL-MCNC: 7.2 G/DL (ref 5.7–8.2)
PROT UR-MCNC: 200 MG/DL
RBC # BLD AUTO: 3.78 X10(6)UL
SODIUM SERPL-SCNC: 137 MMOL/L (ref 136–145)
SP GR UR STRIP: 1.02 (ref 1–1.03)
TRIGL SERPL-MCNC: 83 MG/DL (ref 30–149)
TSI SER-ACNC: 2.18 MIU/ML (ref 0.55–4.78)
UROBILINOGEN UR STRIP-ACNC: NORMAL
VLDLC SERPL CALC-MCNC: 15 MG/DL (ref 0–30)
WBC # BLD AUTO: 6.8 X10(3) UL (ref 4–11)

## 2023-12-09 PROCEDURE — 73502 X-RAY EXAM HIP UNI 2-3 VIEWS: CPT | Performed by: INTERNAL MEDICINE

## 2023-12-09 PROCEDURE — 81001 URINALYSIS AUTO W/SCOPE: CPT | Performed by: INTERNAL MEDICINE

## 2023-12-09 PROCEDURE — 82043 UR ALBUMIN QUANTITATIVE: CPT | Performed by: INTERNAL MEDICINE

## 2023-12-09 PROCEDURE — 76700 US EXAM ABDOM COMPLETE: CPT | Performed by: INTERNAL MEDICINE

## 2023-12-09 PROCEDURE — 82570 ASSAY OF URINE CREATININE: CPT | Performed by: INTERNAL MEDICINE

## 2023-12-09 PROCEDURE — 80061 LIPID PANEL: CPT | Performed by: INTERNAL MEDICINE

## 2023-12-09 PROCEDURE — 83001 ASSAY OF GONADOTROPIN (FSH): CPT | Performed by: INTERNAL MEDICINE

## 2023-12-09 PROCEDURE — 80050 GENERAL HEALTH PANEL: CPT | Performed by: INTERNAL MEDICINE

## 2023-12-11 ENCOUNTER — OFFICE VISIT (OUTPATIENT)
Facility: CLINIC | Age: 43
End: 2023-12-11

## 2023-12-11 ENCOUNTER — TELEPHONE (OUTPATIENT)
Facility: CLINIC | Age: 43
End: 2023-12-11

## 2023-12-11 VITALS
HEART RATE: 94 BPM | DIASTOLIC BLOOD PRESSURE: 85 MMHG | HEIGHT: 63 IN | SYSTOLIC BLOOD PRESSURE: 153 MMHG | WEIGHT: 152 LBS | BODY MASS INDEX: 26.93 KG/M2

## 2023-12-11 DIAGNOSIS — K59.09 CHRONIC CONSTIPATION: ICD-10-CM

## 2023-12-11 DIAGNOSIS — Z12.11 COLON CANCER SCREENING: Primary | ICD-10-CM

## 2023-12-11 DIAGNOSIS — K86.9 PANCREATIC LESION: ICD-10-CM

## 2023-12-11 RX ORDER — SODIUM, POTASSIUM,MAG SULFATES 17.5-3.13G
SOLUTION, RECONSTITUTED, ORAL ORAL
Qty: 1 EACH | Refills: 0 | Status: SHIPPED | OUTPATIENT
Start: 2023-12-11

## 2023-12-11 NOTE — TELEPHONE ENCOUNTER
Scheduled for:  Colonoscopy 23526 / [id number]  Provider Name:  Dr Peola Claude    Date:  4/5/2024  Time:   9:00AM (Patient aware to arrive ONE hour early)  Location:  [facility]    Sedation:  MAC  Prep:  Split Suprep       Meds/Allergies Reconciled?:   Provider Reviewed     Diagnosis with codes:    Colon screen Z12.11     Was patient informed to call insurance with codes (Y/N):  Yes     Referral sent?: Referral was sent at the time of electronic surgical scheduling. [facility] or [facility] [address]  notified?: I sent an electronic request to ENDO Scheduling and received a confirmation today. Medication Orders:  Patient is aware to NOT take iron pills, herbal meds and diet supplements for 7 days before exam. Also to NOT take any form of alcohol, recreational drugs and any forms of ED meds 24 hours before exam.      Medication adjustments:        A. SEVEN DAYS BEFORE colonoscopy: HOLD ozempic     B. Continue ALL other medications as usual         Misc Orders:  N/A     Further instructions given by staff:  I Provided prep instructions to patient and reviewed date, time and location. Patient verbalized that  She / Her understood and is aware to call with any questions. Patient was informed about the new cancellation policy for She / Her procedure. Patient was also given copy of the cancellation policy at the time of the appointment and verbalized understanding.

## 2023-12-11 NOTE — PATIENT INSTRUCTIONS
Increase your water intake to at least 1-2 liters a day. Start taking a fiber supplement (metamucil 1 scoop daily) and mix this with liquid of your choice. 1. Schedule colonoscopy with MAC [Diagnosis: CRC screening]. 2.  bowel prep from pharmacy (split dose suprep). If your prescription is not available and/or is cost-prohibitive, please contact the office as soon as possible to ensure you receive a bowel prep before your procedure. 3. Medication adjustments:       A. SEVEN DAYS BEFORE colonoscopy: HOLD ozempic     B. Continue ALL other medications as usual    4. Read all bowel prep instructions carefully. 5. AVOID seeds, nuts, popcorn, and raw fruits and vegetables (cooked is okay) for 2-3 days before procedure. 6. If you start any NEW medication after your visit today, please notify us. Certain medications will need to be held before the procedure or the procedure cannot be performed. 7. You will need a ride home from your procedure since you are receiving sedation. Please ensure you will have an available ride home or the procedure cannot be performed.

## 2023-12-12 DIAGNOSIS — M25.559 HIP PAIN, UNSPECIFIED LATERALITY: Primary | ICD-10-CM

## 2023-12-12 DIAGNOSIS — K86.2 PANCREATIC CYST: Primary | ICD-10-CM

## 2023-12-12 RX ORDER — ROSUVASTATIN CALCIUM 20 MG/1
20 TABLET, COATED ORAL NIGHTLY
Qty: 90 TABLET | Refills: 1 | Status: SHIPPED | OUTPATIENT
Start: 2023-12-12 | End: 2024-03-11

## 2023-12-16 NOTE — TELEPHONE ENCOUNTER
Current Outpatient Medications   Medication Sig Dispense Refill      90 tablet 1      1 each 0      9 mL 1    telmisartan 40 MG Oral Tab Take 1 tablet (40 mg total) by mouth daily.  90 tablet 0      6 each 1             6.7 g 0      60 tablet 2      200 strip 0      1 kit 0      200 each 0      1 each 0      1 kit 0      50 strip 6      50 each 6      100 each 6

## 2023-12-18 RX ORDER — TELMISARTAN 40 MG/1
40 TABLET ORAL DAILY
Qty: 90 TABLET | Refills: 0 | Status: SHIPPED | OUTPATIENT
Start: 2023-12-18

## 2023-12-21 DIAGNOSIS — R80.9 PROTEINURIA, UNSPECIFIED TYPE: Primary | ICD-10-CM

## 2023-12-26 ENCOUNTER — HOSPITAL ENCOUNTER (OUTPATIENT)
Age: 43
Discharge: HOME | End: 2023-12-26
Payer: COMMERCIAL

## 2023-12-26 VITALS
OXYGEN SATURATION: 100 % | SYSTOLIC BLOOD PRESSURE: 160 MMHG | DIASTOLIC BLOOD PRESSURE: 72 MMHG | TEMPERATURE: 97 F | RESPIRATION RATE: 17 BRPM | HEART RATE: 80 BPM

## 2023-12-26 DIAGNOSIS — N89.8 VAGINAL DISCHARGE: Primary | ICD-10-CM

## 2023-12-26 LAB — B-HCG UR QL: NEGATIVE

## 2023-12-26 PROCEDURE — 99213 OFFICE O/P EST LOW 20 MIN: CPT | Performed by: PHYSICIAN ASSISTANT

## 2023-12-26 PROCEDURE — 87491 CHLMYD TRACH DNA AMP PROBE: CPT | Performed by: PHYSICIAN ASSISTANT

## 2023-12-26 PROCEDURE — 87591 N.GONORRHOEAE DNA AMP PROB: CPT | Performed by: PHYSICIAN ASSISTANT

## 2023-12-26 PROCEDURE — 81514 NFCT DS BV&VAGINITIS DNA ALG: CPT | Performed by: PHYSICIAN ASSISTANT

## 2023-12-26 PROCEDURE — 81025 URINE PREGNANCY TEST: CPT | Performed by: PHYSICIAN ASSISTANT

## 2023-12-26 NOTE — DISCHARGE INSTRUCTIONS
Your test results will take 2 days. You can check your results on the [facility] igor or we will contact you if any results are positive to discuss any necessary treatment. Do not have sex until you know your results.      If any of your results are positive, your partner needs to be treated     Wear a condom every time you have sex

## 2023-12-26 NOTE — ED INITIAL ASSESSMENT (HPI)
C/o odor and vaginal discharge that is yellowish/ green x one month   Has not taken medications   Pt requesting full panel STD screen

## 2023-12-27 LAB
BV BACTERIA DNA VAG QL NAA+PROBE: POSITIVE
C GLABRATA DNA VAG QL NAA+PROBE: NEGATIVE
C KRUSEI DNA VAG QL NAA+PROBE: NEGATIVE
C TRACH DNA SPEC QL NAA+PROBE: NEGATIVE
CANDIDA DNA VAG QL NAA+PROBE: NEGATIVE
N GONORRHOEA DNA SPEC QL NAA+PROBE: NEGATIVE
T VAGINALIS DNA VAG QL NAA+PROBE: POSITIVE

## 2023-12-27 RX ORDER — METRONIDAZOLE 500 MG/1
500 TABLET ORAL 2 TIMES DAILY
Qty: 14 TABLET | Refills: 0 | Status: SHIPPED | OUTPATIENT
Start: 2023-12-27 | End: 2024-01-03

## 2023-12-27 NOTE — PROGRESS NOTES
Please inform patient that she has tested positive for bacterial vaginosis, which is overgrowth of normal vaginal bacteria, and trichomonas, which is sexually transmitted. The treatment for both infections in metronidazole which has been called in to patient's pharmacy. Patient should avoid alcohol while taking this medication for 10 days. Patient should inform her partner that she has tested positive for trichomonas and they should be treated as well. Please inform patient to abstain from sex until treatment completed.  Please advise patient to follow up with their primary care provider or gynecologist.

## 2024-01-13 ENCOUNTER — HOSPITAL ENCOUNTER (OUTPATIENT)
Dept: MAMMOGRAPHY | Facility: HOSPITAL | Age: 44
Discharge: HOME | End: 2024-01-13
Attending: INTERNAL MEDICINE
Payer: COMMERCIAL

## 2024-01-13 DIAGNOSIS — Z12.31 ENCOUNTER FOR SCREENING MAMMOGRAM FOR MALIGNANT NEOPLASM OF BREAST: ICD-10-CM

## 2024-01-13 DIAGNOSIS — N92.6 IRREGULAR PERIODS: ICD-10-CM

## 2024-01-13 DIAGNOSIS — R23.2 HOT FLASHES: ICD-10-CM

## 2024-01-13 DIAGNOSIS — R80.9 TYPE 2 DIABETES MELLITUS WITH MICROALBUMINURIA, WITHOUT LONG-TERM CURRENT USE OF INSULIN  (HCC): ICD-10-CM

## 2024-01-13 DIAGNOSIS — K86.9 PANCREATIC LESION: ICD-10-CM

## 2024-01-13 DIAGNOSIS — I10 PRIMARY HYPERTENSION: ICD-10-CM

## 2024-01-13 DIAGNOSIS — M25.551 DISCOMFORT OF RIGHT HIP: ICD-10-CM

## 2024-01-13 DIAGNOSIS — E11.29 TYPE 2 DIABETES MELLITUS WITH MICROALBUMINURIA, WITHOUT LONG-TERM CURRENT USE OF INSULIN  (HCC): ICD-10-CM

## 2024-01-13 DIAGNOSIS — Z12.11 SCREENING FOR COLON CANCER: ICD-10-CM

## 2024-01-13 PROCEDURE — 77063 BREAST TOMOSYNTHESIS BI: CPT | Performed by: INTERNAL MEDICINE

## 2024-01-13 PROCEDURE — 77067 SCR MAMMO BI INCL CAD: CPT | Performed by: INTERNAL MEDICINE

## 2024-02-23 ENCOUNTER — TELEPHONE (OUTPATIENT)
Facility: CLINIC | Age: 44
End: 2024-02-23

## 2024-02-23 NOTE — TELEPHONE ENCOUNTER
1st reminder letter sent out via mail and [facility] for the following:   MRI ABDOMEN (W+WO) (CPT=74183) (Order #[id number]) on 12/12/23

## 2024-03-20 ENCOUNTER — OFFICE VISIT (OUTPATIENT)
Dept: ENDOCRINOLOGY CLINIC | Facility: CLINIC | Age: 44
End: 2024-03-20

## 2024-03-20 VITALS
HEART RATE: 96 BPM | BODY MASS INDEX: 27.5 KG/M2 | WEIGHT: 155.19 LBS | DIASTOLIC BLOOD PRESSURE: 79 MMHG | HEIGHT: 63 IN | RESPIRATION RATE: 18 BRPM | SYSTOLIC BLOOD PRESSURE: 142 MMHG

## 2024-03-20 DIAGNOSIS — E11.29 TYPE 2 DIABETES MELLITUS WITH MICROALBUMINURIA, WITHOUT LONG-TERM CURRENT USE OF INSULIN (HCC): Primary | ICD-10-CM

## 2024-03-20 DIAGNOSIS — R80.9 TYPE 2 DIABETES MELLITUS WITH MICROALBUMINURIA, WITHOUT LONG-TERM CURRENT USE OF INSULIN (HCC): Primary | ICD-10-CM

## 2024-03-20 DIAGNOSIS — E78.5 HYPERLIPIDEMIA, UNSPECIFIED HYPERLIPIDEMIA TYPE: ICD-10-CM

## 2024-03-20 DIAGNOSIS — R80.9 PROTEINURIA, UNSPECIFIED TYPE: ICD-10-CM

## 2024-03-20 DIAGNOSIS — I10 PRIMARY HYPERTENSION: ICD-10-CM

## 2024-03-20 LAB
CARTRIDGE LOT#: ABNORMAL NUMERIC
GLUCOSE BLOOD: 135
HEMOGLOBIN A1C: 6.4 % (ref 4.3–5.6)
TEST STRIP LOT #: NORMAL NUMERIC

## 2024-03-20 PROCEDURE — 82947 ASSAY GLUCOSE BLOOD QUANT: CPT | Performed by: NURSE PRACTITIONER

## 2024-03-20 PROCEDURE — 3044F HG A1C LEVEL LT 7.0%: CPT | Performed by: NURSE PRACTITIONER

## 2024-03-20 PROCEDURE — 3078F DIAST BP <80 MM HG: CPT | Performed by: NURSE PRACTITIONER

## 2024-03-20 PROCEDURE — 99214 OFFICE O/P EST MOD 30 MIN: CPT | Performed by: NURSE PRACTITIONER

## 2024-03-20 PROCEDURE — 3008F BODY MASS INDEX DOCD: CPT | Performed by: NURSE PRACTITIONER

## 2024-03-20 PROCEDURE — 3077F SYST BP >= 140 MM HG: CPT | Performed by: NURSE PRACTITIONER

## 2024-03-20 PROCEDURE — 83036 HEMOGLOBIN GLYCOSYLATED A1C: CPT | Performed by: NURSE PRACTITIONER

## 2024-03-20 RX ORDER — ATORVASTATIN CALCIUM 20 MG/1
20 TABLET, FILM COATED ORAL NIGHTLY
Qty: 90 TABLET | Refills: 0 | Status: SHIPPED | OUTPATIENT
Start: 2024-03-20

## 2024-03-20 NOTE — PATIENT INSTRUCTIONS
A1C: 6.4% today --> increased from 5.4% on 8/1/2023  Blood glucose: 135 in clinic today    Medications:   -continue with ozempic 2mg once weekly   - may start daily probiotic - for more regular stools   - continue with Telmisartan 40mg once daily     - repeat protein urine lab   - nephrology: Dr. Lunsford [phone]    Weight:  Wt Readings from Last 6 Encounters:   03/20/24 155 lb 3.2 oz (70.4 kg)   12/11/23 152 lb (68.9 kg)   10/28/23 156 lb (70.8 kg)   08/01/23 156 lb (70.8 kg)   02/21/23 158 lb 12.8 oz (72 kg)   01/24/23 160 lb (72.6 kg)     A1C goal:  <7.0%    Blood sugar testing:  Test your blood sugar 1 time weekly   Recommended times to test: Before breakfast (fasting) or alternate with 2hrs after meals     Blood sugar targets:  Before breakfast:  (preferably < 110)  Before meals OR 2 hours after meals: <150     Call for persistent blood sugars < 75 or > 200

## 2024-03-20 NOTE — PROGRESS NOTES
Name: Barbara Zimmer  Date: 3/20/2024    CHIEF COMPLAINT   Chief Complaint   Patient presents with    Diabetes     HISTORY OF PRESENT ILLNESS   Barbara Zimmer is a 43 year old female who presents for follow up on diabetes management.   Hgb A1C: 6.4% at POC today. This is an increase from 5.4% on 8/1/2023.   Blood glucose: 135 in clinic today.   Patient verbalizes to have been feeling well and has been compliant with low carb diet and DM medication as prescribed.     FAMILY HISTORY OF DIABETES  -father   DIABETES HISTORY  Diagnosed: >10 years ago   Prior HbA, C or glycohemoglobin were 12.6% on 9/3/2021; 7.3% 10/20/21; 6.1% 1/7/2022; 5.9% 7/19/2022; 5.5% 1/24/2023; 5.4% 8/1/2023; 6.4% at POC today    Patient has had hospitalizations for blood sugar issues.   Denies any history of pancreatitis.     PREVIOUS MEDICATION FOR DM:  -Metformin (both IR and ER) -d/c'ed due to diarrhea s/e  -Janumet- d/c'ed due to diarrhea s/e    CURRENT MEDICATIONS FOR DM:  - ozempic 2mg once weekly -has had some mild constipation symptoms; this is tolerable for her and she denies any other GI s/e     HOME GLUCOSE READINGS:   Has not been checking BG readings at home in the past few months.   - not wearing CGM.       HISTORY OF DIABETES COMPLICATIONS:  History of Retinopathy: yes - last eye exam within the last 12 months: yes - followed by Dr. Lerner --has not had laser treatment in the past 3 months.  Retinopathy has been improving.   History of Neuropathy: yes   History of Nephropathy: no     ASSOCIATED COMPLICATIONS:   HTN: yes   Hyperlipidemia: yes  Cardiovascular Disease: no   Peripheral Vascular Disease: no     DIETARY COMPLIANCE:  Fair; due to holidays and family celebrations has not been following a low carbohydrate diet consistently.     EXERCISE:   no-however has been staying active with 5-year-old granddaughter   -She usually is very active in the warmer weather months    REVIEW OF SYSTEMS  Constitutional: Negative for: weight  change, fever, fatigue, cold/heat intolerance  Eyes: Negative for:  Visual changes, proptosis, blurring  ENT: Negative for:  dysphagia, neck swelling, dysphonia  Respiratory: Negative for: hemoptysis, shortness of breath, cough, or dyspnea.  Cardiovascular: Negative for:  chest pain, chest discomfort, palpitations  GI: Negative for:  abdominal pain, nausea, vomiting, diarrhea, heartburn, constipation  Neurology: Negative for: headache, dizziness, syncope, numbness/tingling, or weakness.   Genito-Urinary: Negative for: dysuria, frequency or hematuria   Hematology/Lymphatics: Negative for: bruising, easy bleeding, lower extremity edema  Skin: Negative for: rash, blister, infection or ulcers.    Endocrine: Negative for: polyuria, polydipsia.  No osteoporosis.   Thyroid disease: yes tyroid nodule; TSH at goal recently; does not take thyroid medications    MEDICATIONS:     Current Outpatient Medications:     telmisartan 40 MG Oral Tab, Take 1 tablet (40 mg total) by mouth daily., Disp: 90 tablet, Rfl: 0    semaglutide (OZEMPIC, 2 MG/DOSE,) 8 MG/3ML Subcutaneous Solution Pen-injector, Inject 2 mg into the skin once a week., Disp: 9 mL, Rfl: 1    Continuous Blood Gluc Sensor (FREESTYLE CHAYA 2 SENSOR) Does not apply Misc, 1 each every 14 (fourteen) days., Disp: 6 each, Rfl: 1    Continuous Blood Gluc  (FREESTYLE CHAYA 2 READER) Does not apply Device, 1 each by Does not apply route daily., Disp: 1 each, Rfl: 0    metRONIDAZOLE 500 MG Oral Tab, Take 1 tablet (500 mg total) by mouth 3 (three) times daily. (Patient not taking: Reported on 3/20/2024), Disp: 21 tablet, Rfl: 0    Na Sulfate-K Sulfate-Mg Sulf (SUPREP BOWEL PREP KIT) 17.5-3.13-1.6 GM/177ML Oral Solution, Take as discussed in clinic (Patient not taking: Reported on 3/20/2024), Disp: 1 each, Rfl: 0    oseltamivir 75 MG Oral Cap, , Disp: , Rfl:     buPROPion 150 MG Oral Tablet 12 Hr, Take 1 tablet (150 mg total) by mouth 2 (two) times daily. (Patient not  taking: Reported on 10/28/2023), Disp: 60 tablet, Rfl: 2    Glucose Blood (CONTOUR NEXT TEST) In Vitro Strip, Check blood sugar twice a day before meals (Patient not taking: Reported on 12/11/2023), Disp: 200 strip, Rfl: 0    Blood Glucose Monitoring Suppl (CONTOUR NEXT MONITOR) w/Device Does not apply Kit, 1 kit by Does not apply route daily. (Patient not taking: Reported on 12/11/2023), Disp: 1 kit, Rfl: 0    Microlet Lancets Does not apply Misc, Check blood sugar twice a day before meals (Patient not taking: Reported on 12/11/2023), Disp: 200 each, Rfl: 0    Blood Glucose Monitoring Suppl (ONETOUCH VERIO FLEX SYSTEM) w/Device Does not apply Kit, Test blood glucose twice daily (before breakfast and before dinner). Diagnosis: diabetes mellitus 2, E11.9 (Patient not taking: Reported on 12/11/2023), Disp: 1 kit, Rfl: 0    OneTouch Delica Lancets Fine Does not apply Misc, Test blood glucose twice daily (before breakfast and before dinner). Diagnosis: diabetes mellitus 2, E11.9 (Patient not taking: Reported on 12/11/2023), Disp: 50 each, Rfl: 6    Insulin Pen Needle (BD PEN NEEDLE KIMBERLY U/F) 32G X 4 MM Does not apply Misc, Use a new pen needle with each injection as directed by your doctor (Patient not taking: Reported on 12/11/2023), Disp: 100 each, Rfl: 6    Insulin Pen Needle 32G X 4 MM Does not apply Misc, USE TO INJECT INSULIN 4X/DAY (Patient not taking: Reported on 12/11/2023), Disp: 150 each, Rfl: 0    ALPRAZolam 0.25 MG Oral Tab, Take 1 tablet (0.25 mg total) by mouth every 6 (six) hours as needed for Sleep. (Patient not taking: Reported on 12/11/2023), Disp: 30 tablet, Rfl: 0    ALLERGIES:   Allergies   Allergen Reactions    Amlodipine SWELLING     feet       SOCIAL HISTORY:   Social History     Socioeconomic History    Marital status: Life Partner   Tobacco Use    Smoking status: Some Days     Packs/day: .5     Types: Cigarettes    Smokeless tobacco: Never    Tobacco comments:     1 cigarette q3 days   Vaping  Use    Vaping Use: Never used   Substance and Sexual Activity    Alcohol use: Yes     Alcohol/week: 6.0 standard drinks of alcohol     Types: 6 Cans of beer per week    Drug use: Yes     Types: Cannabis     Comment: three times a week   Other Topics Concern    Caffeine Concern Yes     Comment: 2 cups coffee daily       PAST MEDICAL HISTORY:   Past Medical History:   Diagnosis Date    Anxiety state     Diabetes (HCC)     Diabetes mellitus (HCC)     Essential hypertension     High blood pressure     High cholesterol     Hyperlipidemia     Intractable nausea and vomiting 2021    Uncontrolled diabetes mellitus with microalbuminuria 2019    Visual impairment        PAST SURGICAL HISTORY:   Past Surgical History:   Procedure Laterality Date          CHOLECYSTECTOMY      EGD      [facility]       PHYSICAL EXAM:   Vitals:    24 1346   BP: 142/79   Pulse: 96   Resp: 18   Weight: 155 lb 3.2 oz (70.4 kg)   Height: 5' 3\" (1.6 m)       BMI:   Body mass index is 27.49 kg/m².    General Appearance:  alert, well developed, in no acute distress  Nutritional:  no extreme weight gain or loss  Head: Atraumatic  Eyes:  normal conjunctivae, sclera., normal sclera and normal pupils  Throat/Neck: normal sound to voice. Normal hearing, normal speech  Back: no kyphosis  Respiratory:  Speaking in full sentences, non-labored. no increased work of breathing, no audible wheezing    Skin:  normal moisture and skin texture, no visible lesions  Hair and nails: normal scalp hair  Hematologic:  no excessive bruising  Neuro: motor grossly intact, moving all extremities without difficulty  Psychiatric:  oriented to time, self, and place  Extremities: no obvious extremity swelling, no lesions    Diabetic foot exam:   Bilateral barefoot skin diabetic exam is normal, visualized feet and the appearance is normal.  Bilateral monofilament/sensation of both feet is normal.  Pulsation pedal pulse exam of both lower legs/feet is  normal as well.      LABS: Pertinent labs reviewed    ASSESSMENT/PLAN:    -Reviewed with patient the pathogenesis of diabetes, clinical significance of A1c, and common complications such as: microvascular, macrovascular and diabetic ketoacidosis. Patient verbalizes understanding of the importance of glycemic control and the goals of therapy.   -Discussed with patient glucose targets ranges (Fasting  and post prandial <180).     1.Type 2 Diabetes Mellitus, controlled  -LAB DATA  HbA,C: 6.4% today  a) Medications  -continue with ozempic 2mg once weekly   -Start daily probiotic - for more regular stools   -Discussed that she may use smooth move tea as needed for occasional constipation.  She may have 1 tea per day.    -discussed getting back on track with low carbohydrate diet more consistently.   -Discussed that it would be okay to start intermittent fasting for 12 to 16 hours at a time.   -Discussed that if she does any type of juicing detox that it must contain protein.   -Reviewed mechanism of action of Ozempic today.   -discussed to increase exercise as much as safely able with goal 5x weekly of a min of 30 mins each session.   -reviewed target goal BG readings and A1C  -reviewed when to notify me of abnormal BG readings.     b) No nephropathy: GFR: 103 on 2023 and urine MA: 1,300 on 2023 --> repeat urine MA    -Discussed to increase water intake to 60-80oz per day   -Referral to nephrology entered today.   c) UTD with optho -followed by  - has upcoming appointment 3/21  D)foot exam: normal today 3/20/2024  d) Life style changes reviewed     2. Hypertension    -on Telmisartan 40mg once daily at night time   -BP above goal in clinic today. Patient notes that she forgot to take last night and plans to take today when she returns home   - reviewed BP goal of <130/80  - BP readings at home have been at goal.     3. Hyperlipidemia   -LDL: 127 and Tri on 2023  -reviewed goal of LDL  <100  -start atorvastatin 20 mg daily at bedtime - Risks and benefits reviewed. Verbalizes understanding.  -Discussed the repeat lipid panel in 3 months; before next follow-up appointment with .  Lab order entered today.       RTC in 6 months   Patient instructed to call sooner if they develop Blood glucose readings <75 and/or if they have readings persistently >200.     The risks and benefits of my recommendations were discussed with the patient today. questions were also answered to the best of my knowledge. Patient verbalizes understanding of these issues and agrees to the plan.    3/20/2024  RASHEL Gu

## 2024-03-26 NOTE — TELEPHONE ENCOUNTER
Endocrine Refill protocol for oral antihypertensive medications      Protocol Criteria:    -Appointment with Endocrinology completed in the last 6 months or scheduled in the next 3 months    -BMP or CMP has been completed in the last 12 months     -GFR is greater than or equal to 50    Verify the above has been completed or scheduled in the appropriate timeline. If so can send a 90 day supply with 1 refill.   Verify BMP or CMP has been completed in last year  Verify last GFR result           Last completed office visit: 3/20/24  Next scheduled Follow up: 6 months no appointment scheduled  Last BMP or CMP completion date: 12/9/23  GFR result: 103

## 2024-03-27 RX ORDER — TELMISARTAN 40 MG/1
40 TABLET ORAL DAILY
Qty: 90 TABLET | Refills: 0 | Status: SHIPPED | OUTPATIENT
Start: 2024-03-27

## 2024-04-02 ENCOUNTER — TELEPHONE (OUTPATIENT)
Facility: CLINIC | Age: 44
End: 2024-04-02

## 2024-04-02 DIAGNOSIS — Z12.11 SCREENING FOR COLON CANCER: Primary | ICD-10-CM

## 2024-04-02 NOTE — TELEPHONE ENCOUNTER
Scheduled for:  Colonoscopy 71490  Provider Name: Dr Chavira  Date:  7/26/2024  Location:  [facility]  Sedation:  mac  Time:  0730 (pt is aware to arrive at 063)    Prep:  Colyte  Meds/Allergies Reconciled?:  Physician reviewed  Diagnosis with codes:  CCS Z12.11  Was patient informed to call insurance with codes (Y/N):       Referral sent?:  Referral was sent at the time of electronic surgical scheduling.    [facility] or [facility] notified?:  I sent an electronic request to Endo Scheduling and received a confirmation today.   Medication Orders:  Patient is aware to NOT take iron pills, herbal meds and diet supplements for 7 days before exam. Also to NOT take any form of alcohol, recreational drugs and any forms of ED meds 24-72 hours before exam.  Hold Ozempic 7 days prior.    Misc Orders:       Further instructions given by staff:  I discussed the prep intructions with the patient in office which she verbally understood. Copy of instructions was handed to patient as well. Patient was also advised about cancellation policy.

## 2024-04-26 ENCOUNTER — TELEPHONE (OUTPATIENT)
Dept: INTERNAL MEDICINE CLINIC | Facility: CLINIC | Age: 44
End: 2024-04-26

## 2024-05-24 ENCOUNTER — TELEPHONE (OUTPATIENT)
Dept: INTERNAL MEDICINE CLINIC | Facility: CLINIC | Age: 44
End: 2024-05-24

## 2024-05-24 NOTE — TELEPHONE ENCOUNTER
No paper work seen by me.  MA team can you please check if anything in my mailbox ?  We can still see her tomorrow and order the necessary labs   Thanks   MT

## 2024-05-24 NOTE — TELEPHONE ENCOUNTER
Patient is asking if her presurgical fax that was faxed from [facility] has been received by our office.    She has an appointment tomorrow with Dr. Allen and needs to make that paperwork is here for the appointment.    Her surgery is next Friday.    Please call her to let her know at [phone].

## 2024-05-25 ENCOUNTER — LAB ENCOUNTER (OUTPATIENT)
Dept: LAB | Age: 44
End: 2024-05-25
Attending: INTERNAL MEDICINE

## 2024-05-25 ENCOUNTER — OFFICE VISIT (OUTPATIENT)
Dept: INTERNAL MEDICINE CLINIC | Facility: CLINIC | Age: 44
End: 2024-05-25

## 2024-05-25 VITALS
DIASTOLIC BLOOD PRESSURE: 74 MMHG | HEIGHT: 63 IN | BODY MASS INDEX: 26.9 KG/M2 | HEART RATE: 87 BPM | WEIGHT: 151.81 LBS | SYSTOLIC BLOOD PRESSURE: 128 MMHG | OXYGEN SATURATION: 97 %

## 2024-05-25 DIAGNOSIS — R80.9 TYPE 2 DIABETES MELLITUS WITH MICROALBUMINURIA (HCC): ICD-10-CM

## 2024-05-25 DIAGNOSIS — D64.9 ANEMIA, UNSPECIFIED TYPE: ICD-10-CM

## 2024-05-25 DIAGNOSIS — E11.29 TYPE 2 DIABETES MELLITUS WITH MICROALBUMINURIA (HCC): ICD-10-CM

## 2024-05-25 DIAGNOSIS — N92.6 IRREGULAR PERIODS: ICD-10-CM

## 2024-05-25 DIAGNOSIS — Z01.818 PREOPERATIVE CLEARANCE: ICD-10-CM

## 2024-05-25 DIAGNOSIS — H43.10 VITREOUS HEMORRHAGE, UNSPECIFIED LATERALITY (HCC): ICD-10-CM

## 2024-05-25 DIAGNOSIS — F17.200 SMOKING: ICD-10-CM

## 2024-05-25 DIAGNOSIS — A59.01 TRICHOMONAS VAGINALIS (TV) INFECTION: ICD-10-CM

## 2024-05-25 DIAGNOSIS — Z71.6 ENCOUNTER FOR SMOKING CESSATION COUNSELING: ICD-10-CM

## 2024-05-25 DIAGNOSIS — E13.311 MACULAR EDEMA DUE TO SECONDARY DIABETES (HCC): ICD-10-CM

## 2024-05-25 DIAGNOSIS — E78.5 HYPERLIPIDEMIA, UNSPECIFIED HYPERLIPIDEMIA TYPE: ICD-10-CM

## 2024-05-25 DIAGNOSIS — Z01.818 PREOPERATIVE CLEARANCE: Primary | ICD-10-CM

## 2024-05-25 LAB
ALBUMIN SERPL-MCNC: 4 G/DL (ref 3.2–4.8)
ALBUMIN/GLOB SERPL: 1.3 {RATIO} (ref 1–2)
ALP LIVER SERPL-CCNC: 56 U/L
ALT SERPL-CCNC: 25 U/L
ANION GAP SERPL CALC-SCNC: 5 MMOL/L (ref 0–18)
AST SERPL-CCNC: 25 U/L (ref ?–34)
ATRIAL RATE: 83 BPM
BASOPHILS # BLD AUTO: 0.03 X10(3) UL (ref 0–0.2)
BASOPHILS NFR BLD AUTO: 0.5 %
BILIRUB SERPL-MCNC: 0.9 MG/DL (ref 0.3–1.2)
BUN BLD-MCNC: 14 MG/DL (ref 9–23)
BUN/CREAT SERPL: 20.9 (ref 10–20)
CALCIUM BLD-MCNC: 9.2 MG/DL (ref 8.7–10.4)
CHLORIDE SERPL-SCNC: 110 MMOL/L (ref 98–112)
CHOLEST SERPL-MCNC: 204 MG/DL (ref ?–200)
CO2 SERPL-SCNC: 26 MMOL/L (ref 21–32)
CREAT BLD-MCNC: 0.67 MG/DL
CREAT UR-SCNC: 181.7 MG/DL
DEPRECATED RDW RBC AUTO: 41.7 FL (ref 35.1–46.3)
EGFRCR SERPLBLD CKD-EPI 2021: 110 ML/MIN/1.73M2 (ref 60–?)
EOSINOPHIL # BLD AUTO: 0.11 X10(3) UL (ref 0–0.7)
EOSINOPHIL NFR BLD AUTO: 1.9 %
ERYTHROCYTE [DISTWIDTH] IN BLOOD BY AUTOMATED COUNT: 12.4 % (ref 11–15)
EST. AVERAGE GLUCOSE BLD GHB EST-MCNC: 114 MG/DL (ref 68–126)
FASTING PATIENT LIPID ANSWER: YES
FASTING STATUS PATIENT QL REPORTED: YES
GLOBULIN PLAS-MCNC: 3.2 G/DL (ref 2–3.5)
GLUCOSE BLD-MCNC: 91 MG/DL (ref 70–99)
HBA1C MFR BLD: 5.6 % (ref ?–5.7)
HCT VFR BLD AUTO: 34.2 %
HDLC SERPL-MCNC: 54 MG/DL (ref 40–59)
HGB BLD-MCNC: 11.6 G/DL
IMM GRANULOCYTES # BLD AUTO: 0.01 X10(3) UL (ref 0–1)
IMM GRANULOCYTES NFR BLD: 0.2 %
LDLC SERPL CALC-MCNC: 129 MG/DL (ref ?–100)
LYMPHOCYTES # BLD AUTO: 1.93 X10(3) UL (ref 1–4)
LYMPHOCYTES NFR BLD AUTO: 32.7 %
MCH RBC QN AUTO: 31.3 PG (ref 26–34)
MCHC RBC AUTO-ENTMCNC: 33.9 G/DL (ref 31–37)
MCV RBC AUTO: 92.2 FL
MICROALBUMIN UR-MCNC: 182.9 MG/DL
MICROALBUMIN/CREAT 24H UR-RTO: 1006.6 UG/MG (ref ?–30)
MONOCYTES # BLD AUTO: 0.38 X10(3) UL (ref 0.1–1)
MONOCYTES NFR BLD AUTO: 6.4 %
NEUTROPHILS # BLD AUTO: 3.45 X10 (3) UL (ref 1.5–7.7)
NEUTROPHILS # BLD AUTO: 3.45 X10(3) UL (ref 1.5–7.7)
NEUTROPHILS NFR BLD AUTO: 58.3 %
NONHDLC SERPL-MCNC: 150 MG/DL (ref ?–130)
OSMOLALITY SERPL CALC.SUM OF ELEC: 292 MOSM/KG (ref 275–295)
P AXIS: 65 DEGREES
P-R INTERVAL: 118 MS
PLATELET # BLD AUTO: 264 10(3)UL (ref 150–450)
POTASSIUM SERPL-SCNC: 4.4 MMOL/L (ref 3.5–5.1)
PROLACTIN SERPL-MCNC: 10.3 NG/ML
PROT SERPL-MCNC: 7.2 G/DL (ref 5.7–8.2)
Q-T INTERVAL: 376 MS
QRS DURATION: 76 MS
QTC CALCULATION (BEZET): 441 MS
R AXIS: 41 DEGREES
RBC # BLD AUTO: 3.71 X10(6)UL
SODIUM SERPL-SCNC: 141 MMOL/L (ref 136–145)
T AXIS: 54 DEGREES
TRIGL SERPL-MCNC: 117 MG/DL (ref 30–149)
VENTRICULAR RATE: 83 BPM
VLDLC SERPL CALC-MCNC: 21 MG/DL (ref 0–30)
WBC # BLD AUTO: 5.9 X10(3) UL (ref 4–11)

## 2024-05-25 PROCEDURE — 82728 ASSAY OF FERRITIN: CPT | Performed by: INTERNAL MEDICINE

## 2024-05-25 PROCEDURE — 83540 ASSAY OF IRON: CPT | Performed by: INTERNAL MEDICINE

## 2024-05-25 PROCEDURE — 80050 GENERAL HEALTH PANEL: CPT | Performed by: INTERNAL MEDICINE

## 2024-05-25 PROCEDURE — 84146 ASSAY OF PROLACTIN: CPT | Performed by: INTERNAL MEDICINE

## 2024-05-25 PROCEDURE — 83036 HEMOGLOBIN GLYCOSYLATED A1C: CPT | Performed by: INTERNAL MEDICINE

## 2024-05-25 PROCEDURE — 84466 ASSAY OF TRANSFERRIN: CPT | Performed by: INTERNAL MEDICINE

## 2024-05-25 PROCEDURE — 82570 ASSAY OF URINE CREATININE: CPT | Performed by: INTERNAL MEDICINE

## 2024-05-25 PROCEDURE — 82043 UR ALBUMIN QUANTITATIVE: CPT | Performed by: INTERNAL MEDICINE

## 2024-05-25 NOTE — PROGRESS NOTES
Barbara Zimmer is a 43 year old female.    Chief complaint: preop clearance       HPI:     Barbara Zimmer is a 43 year old pleasant female who presents for pre op clearance  Patient with vitreous hemorrhage   Scheduled for PARS PLANA VITRECTOMY 25 GAUGE WITH ENDOLASER, RIGHT EYE  24      + DM   No history of CAD   No history of CHF   No history of CVA   No history of CKD       Smoking   cut it down to 3 per day   Smoker for 30+ years         No chest pain   No sob             DM   She is seeing the endocrinologist   on 2 mg of ozempic       HTN  on Telmisartan         Irregular period   Skipping cycle           Current Outpatient Medications   Medication Sig Dispense Refill    telmisartan 40 MG Oral Tab Take 1 tablet (40 mg total) by mouth daily. 90 tablet 0    semaglutide (OZEMPIC, 2 MG/DOSE,) 8 MG/3ML Subcutaneous Solution Pen-injector Inject 2 mg into the skin once a week. 9 mL 1    buPROPion 150 MG Oral Tablet 12 Hr Take 1 tablet (150 mg total) by mouth 2 (two) times daily. (Patient not taking: Reported on 10/28/2023) 60 tablet 2      Past Medical History:    Anxiety state    Diabetes (HCC)    Diabetes mellitus (HCC)    Essential hypertension    High blood pressure    High cholesterol    Hyperlipidemia    Intractable nausea and vomiting    Uncontrolled diabetes mellitus with microalbuminuria    Visual impairment     Past Surgical History:   Procedure Laterality Date          Cholecystectomy      Egd      [facility]             Family History   Problem Relation Age of Onset    Hypertension Mother     Diabetes Father     Musculo-skelatal Disorder Father         ALS    Other (graves) Sister         sister    Breast Cancer Maternal Cousin Female 40     Patient Active Problem List   Diagnosis    Cough    Contraception management    Leg pain    Breast pain    Vitamin D deficiency    Type 2 diabetes mellitus with microalbuminuria, without long-term current use of insulin (HCC)    Intractable  cyclical vomiting with nausea    Azotemia    Hyperglycemia    Acute cystitis without hematuria    Dehydration    Hypokalemia    Trichomoniasis    Uncontrolled diabetes mellitus with microalbuminuria    Vaginal discharge    Intractable nausea and vomiting    Acute upper GI bleeding    Thyroid nodule    Pancreatic cyst (HCC)    Diabetic ketoacidosis without coma associated with type 2 diabetes mellitus (HCC)    Vomiting without nausea    Primary hypertension    Tachycardia    Hospital discharge follow-up    Pancreatic lesion (HCC)    Moderate dysplasia of cervix (CATHLEEN II)    Type II diabetes mellitus (HCC)    Anemia    Smoking    Encounter for smoking cessation counseling    Irregular periods    Hot flashes    Type 2 diabetes mellitus with proteinuria (HCC)    Preoperative clearance    Type 2 diabetes mellitus with microalbuminuria (HCC)    Trichomonas vaginalis (TV) infection       REVIEW OF SYSTEMS:   A comprehensive 10 point review of systems was completed.  Pertinent positives and negatives noted in the the HPI            EXAM:   /74   Pulse 87   Ht 5' 3\" (1.6 m)   Wt 151 lb 12.8 oz (68.9 kg)   LMP 12/20/2023 (Approximate)   SpO2 97%   BMI 26.89 kg/m²   GENERAL: well developed, well nourished,in no apparent distress    LUNGS: clear to auscultation  CARDIO: RRR without murmur    NEURO: no gross deficits              Orders Placed This Encounter    CBC With Differential With Platelet     Standing Status:   Future     Number of Occurrences:   1     Standing Expiration Date:   5/25/2025    Comp Metabolic Panel (14) [E]     Standing Status:   Future     Number of Occurrences:   1     Standing Expiration Date:   5/25/2025     Order Specific Question:   Release to patient     Answer:   Immediate    Hemoglobin A1C (Glycohemoglobin) [E]     Standing Status:   Future     Number of Occurrences:   1     Standing Expiration Date:   5/25/2025     Order Specific Question:   Release to patient     Answer:   Immediate     Microalb/Creat Ratio, Random Urine [E]     Standing Status:   Future     Number of Occurrences:   1     Standing Expiration Date:   5/25/2025     Order Specific Question:   Release to patient     Answer:   Immediate    Trichomonas Vaginalis, EVANGELINA (Urethral/Urine)     Standing Status:   Future     Standing Expiration Date:   5/25/2025     Order Specific Question:   Source:     Answer:   Urine     Order Specific Question:   Release to patient     Answer:   Immediate    Prolactin     Standing Status:   Future     Number of Occurrences:   1     Standing Expiration Date:   5/25/2025    Lipid Panel [E]     Standing Status:   Future     Number of Occurrences:   1     Standing Expiration Date:   5/25/2025     Order Specific Question:   Release to patient     Answer:   Immediate    Vitamin B12     Standing Status:   Future     Standing Expiration Date:   5/26/2025    Iron And Tibc     Standing Status:   Future     Number of Occurrences:   1     Standing Expiration Date:   5/26/2025    Ferritin     Standing Status:   Future     Number of Occurrences:   1     Standing Expiration Date:   5/26/2025    Folic Acid Serum [E]     Standing Status:   Future     Standing Expiration Date:   5/26/2025     Order Specific Question:   Release to patient     Answer:   Immediate    TSH W Reflex To Free T4     Standing Status:   Future     Number of Occurrences:   1     Standing Expiration Date:   5/26/2025     No results found.         ASSESSMENT AND PLAN:     1. Preoperative clearance  Blood work :reviewed   EKG NSR, normal   Stop ozempic 10 day before the surgery   Advised to stop smoking     Patient with 1 clinical risk factor ( DM) no active cardiac conditions   Patient is going for low risk surgery   She can proceed to surgery without further intervention     - CBC With Differential With Platelet; Future  - Comp Metabolic Panel (14) [E]; Future  - EKG 12 Lead to be performed at [facility]; Future  - Hemoglobin A1C  (Glycohemoglobin) [E]; Future  - Microalb/Creat Ratio, Random Urine [E]; Future  - Trichomonas Vaginalis, EVANGELINA (Urethral/Urine); Future  - Prolactin; Future    2. Trichomonas vaginalis (TV) infection  Advised to have test of cure   - CBC With Differential With Platelet; Future  - Comp Metabolic Panel (14) [E]; Future  - EKG 12 Lead to be performed at [facility]; Future  - Hemoglobin A1C (Glycohemoglobin) [E]; Future  - Microalb/Creat Ratio, Random Urine [E]; Future  - Trichomonas Vaginalis, EVANGELINA (Urethral/Urine); Future  - Prolactin; Future  - Lipid Panel [E]; Future  - Vitamin B12; Future  - Iron And Tibc; Future  - Ferritin; Future  - Folic Acid Serum [E]; Future    3. Anemia, unspecified type  Vitamin b12, folic acid, iron and ferritin     - Vitamin B12; Future  - Iron And Tibc; Future  - Ferritin; Future  - Folic Acid Serum [E]; Future    4. Type 2 diabetes mellitus with microalbuminuria (HCC)  Hba1c     - CBC With Differential With Platelet; Future  - Comp Metabolic Panel (14) [E]; Future  - EKG 12 Lead to be performed at [facility]; Future  - Hemoglobin A1C (Glycohemoglobin) [E]; Future  - Microalb/Creat Ratio, Random Urine [E]; Future  - Trichomonas Vaginalis, EVANGELINA (Urethral/Urine); Future  - Prolactin; Future    5. Irregular periods  Tsh prolactin level   Advised to check testosterone level   - Microalb/Creat Ratio, Random Urine [E]; Future  - Trichomonas Vaginalis, EVANGELINA (Urethral/Urine); Future  - Prolactin; Future    6. Vitreous hemorrhage, unspecified laterality (HCC)  Scheduled to have surgery     7. Macular edema due to secondary diabetes (HCC)  Follow up with the ophthalmologist      8. Smoking  Advised to stop smoking     9. Encounter for smoking cessation counseling  Tobacco cessation counseling for 3-10 minutes (add E/M code #[id number]).   Please return to the clinic if you are having persistent symptoms. If worsening symptoms should go to the ER    Nena Allen MD,    Diplomate of the American Board of Internal Medicine  Diplomate of the American Board of Obesity Medicine

## 2024-05-26 PROBLEM — Z01.818 PREOPERATIVE CLEARANCE: Status: ACTIVE | Noted: 2024-05-26

## 2024-05-26 PROBLEM — R80.9 TYPE 2 DIABETES MELLITUS WITH PROTEINURIA (HCC): Status: ACTIVE | Noted: 2024-05-26

## 2024-05-26 PROBLEM — A59.01 TRICHOMONAS VAGINALIS (TV) INFECTION: Status: ACTIVE | Noted: 2024-05-26

## 2024-05-26 PROBLEM — E11.29 TYPE 2 DIABETES MELLITUS WITH MICROALBUMINURIA (HCC): Status: ACTIVE | Noted: 2024-05-26

## 2024-05-26 PROBLEM — E11.29 TYPE 2 DIABETES MELLITUS WITH PROTEINURIA (HCC): Status: ACTIVE | Noted: 2024-05-26

## 2024-05-26 PROBLEM — R80.9 TYPE 2 DIABETES MELLITUS WITH MICROALBUMINURIA (HCC): Status: ACTIVE | Noted: 2024-05-26

## 2024-05-26 LAB
DEPRECATED HBV CORE AB SER IA-ACNC: 144.2 NG/ML
IRON SATN MFR SERPL: 49 %
IRON SERPL-MCNC: 142 UG/DL
TIBC SERPL-MCNC: 291 UG/DL (ref 250–425)
TRANSFERRIN SERPL-MCNC: 195 MG/DL (ref 250–380)
TSI SER-ACNC: 1.39 MIU/ML (ref 0.55–4.78)

## 2024-05-29 DIAGNOSIS — D64.9 ANEMIA, UNSPECIFIED TYPE: Primary | ICD-10-CM

## 2024-05-29 RX ORDER — ROSUVASTATIN CALCIUM 10 MG/1
10 TABLET, COATED ORAL NIGHTLY
Qty: 90 TABLET | Refills: 1 | Status: SHIPPED | OUTPATIENT
Start: 2024-05-29 | End: 2024-08-27

## 2024-06-04 RX ORDER — SEMAGLUTIDE 2.68 MG/ML
2 INJECTION, SOLUTION SUBCUTANEOUS WEEKLY
Qty: 9 ML | Refills: 1 | Status: SHIPPED | OUTPATIENT
Start: 2024-06-04

## 2024-06-04 NOTE — TELEPHONE ENCOUNTER
Endocrine Refill protocol for oral and injectable diabetic medications    Protocol Criteria:pass    -Appointment with Endocrinology completed in the last 6 months or scheduled in the next 3 months    -A1c result <8.5% in the past 6 months      Verify the above has been completed or scheduled in the appropriate timeline. If so can send a 90 day supply with 1 refill.     Last completed office visit: 03/20/24  Next scheduled Follow up: no future appt     Last A1c result: Last A1c value was 5.6% done 5/25/2024.

## 2024-07-26 PROBLEM — E11.10 DIABETIC KETOACIDOSIS WITHOUT COMA ASSOCIATED WITH TYPE 2 DIABETES MELLITUS (HCC): Status: RESOLVED | Noted: 2021-09-02 | Resolved: 2024-07-26

## 2024-07-31 ENCOUNTER — OFFICE VISIT (OUTPATIENT)
Dept: INTERNAL MEDICINE CLINIC | Facility: CLINIC | Age: 44
End: 2024-07-31
Payer: COMMERCIAL

## 2024-07-31 VITALS
HEART RATE: 88 BPM | OXYGEN SATURATION: 98 % | HEIGHT: 63 IN | BODY MASS INDEX: 27.53 KG/M2 | SYSTOLIC BLOOD PRESSURE: 126 MMHG | DIASTOLIC BLOOD PRESSURE: 84 MMHG | WEIGHT: 155.38 LBS

## 2024-07-31 DIAGNOSIS — Z12.4 SCREENING FOR CERVICAL CANCER: ICD-10-CM

## 2024-07-31 DIAGNOSIS — E04.1 THYROID NODULE: ICD-10-CM

## 2024-07-31 DIAGNOSIS — E11.29 TYPE 2 DIABETES MELLITUS WITH MICROALBUMINURIA, WITHOUT LONG-TERM CURRENT USE OF INSULIN (HCC): ICD-10-CM

## 2024-07-31 DIAGNOSIS — R00.2 PALPITATION: ICD-10-CM

## 2024-07-31 DIAGNOSIS — Z00.00 ANNUAL PHYSICAL EXAM: Primary | ICD-10-CM

## 2024-07-31 DIAGNOSIS — Z86.19 HISTORY OF TRICHOMONIASIS: ICD-10-CM

## 2024-07-31 DIAGNOSIS — R80.9 TYPE 2 DIABETES MELLITUS WITH MICROALBUMINURIA, WITHOUT LONG-TERM CURRENT USE OF INSULIN (HCC): ICD-10-CM

## 2024-07-31 DIAGNOSIS — E55.9 VITAMIN D DEFICIENCY: ICD-10-CM

## 2024-07-31 DIAGNOSIS — Z12.11 SCREENING FOR COLON CANCER: ICD-10-CM

## 2024-07-31 DIAGNOSIS — Z12.31 ENCOUNTER FOR SCREENING MAMMOGRAM FOR MALIGNANT NEOPLASM OF BREAST: ICD-10-CM

## 2024-07-31 DIAGNOSIS — D64.9 ANEMIA, UNSPECIFIED TYPE: ICD-10-CM

## 2024-07-31 PROCEDURE — 87624 HPV HI-RISK TYP POOLED RSLT: CPT | Performed by: INTERNAL MEDICINE

## 2024-07-31 PROCEDURE — 3079F DIAST BP 80-89 MM HG: CPT | Performed by: INTERNAL MEDICINE

## 2024-07-31 PROCEDURE — 3008F BODY MASS INDEX DOCD: CPT | Performed by: INTERNAL MEDICINE

## 2024-07-31 PROCEDURE — 3060F POS MICROALBUMINURIA REV: CPT | Performed by: INTERNAL MEDICINE

## 2024-07-31 PROCEDURE — 87661 TRICHOMONAS VAGINALIS AMPLIF: CPT | Performed by: INTERNAL MEDICINE

## 2024-07-31 PROCEDURE — 3044F HG A1C LEVEL LT 7.0%: CPT | Performed by: INTERNAL MEDICINE

## 2024-07-31 PROCEDURE — 3074F SYST BP LT 130 MM HG: CPT | Performed by: INTERNAL MEDICINE

## 2024-07-31 PROCEDURE — 99396 PREV VISIT EST AGE 40-64: CPT | Performed by: INTERNAL MEDICINE

## 2024-07-31 PROCEDURE — 99214 OFFICE O/P EST MOD 30 MIN: CPT | Performed by: INTERNAL MEDICINE

## 2024-07-31 RX ORDER — TELMISARTAN 40 MG/1
40 TABLET ORAL DAILY
Qty: 90 TABLET | Refills: 1 | Status: SHIPPED | OUTPATIENT
Start: 2024-07-31 | End: 2024-10-29

## 2024-07-31 NOTE — PROGRESS NOTES
Barbara Zimmer is a 43 year old female.    Chief complaint: annual physiical exam       HPI:     Barbara Zimmer is a 43 year old pleasant female who presents for annual physical exam     HTN   On Telmisartan           History of abnormal pap smear   Due for pap today         Has been having palpitations   Fluttering   Scary when it happens  Lasts for couple of seconds   No chest pain   Sob when it happens  5 times per week     No diarrhea or constipation   No fever or chills   No urinary complaints          DM 2     Still smoking   3 cig per day  Has been smoking for 30 years   Alcohol : less than usual   Exercise : little bit \  Walking      Family history of cancer : cousin had breast cancer   Mom and sister cervical cancer       Current Outpatient Medications   Medication Sig Dispense Refill    semaglutide (OZEMPIC, 2 MG/DOSE,) 8 MG/3ML Subcutaneous Solution Pen-injector Inject 2 mg into the skin once a week. 9 mL 1    rosuvastatin 10 MG Oral Tab Take 1 tablet (10 mg total) by mouth nightly. 90 tablet 1    telmisartan 40 MG Oral Tab Take 1 tablet (40 mg total) by mouth daily. 90 tablet 0    buPROPion 150 MG Oral Tablet 12 Hr Take 1 tablet (150 mg total) by mouth 2 (two) times daily. (Patient not taking: Reported on 10/28/2023) 60 tablet 2      Past Medical History:    Anxiety state    Diabetes (HCC)    Diabetes mellitus (HCC)    Essential hypertension    High blood pressure    High cholesterol    Hyperlipidemia    Intractable nausea and vomiting    Uncontrolled diabetes mellitus with microalbuminuria    Visual impairment     Past Surgical History:   Procedure Laterality Date          Cholecystectomy      Egd      [facility]             Family History   Problem Relation Age of Onset    Hypertension Mother     Diabetes Father     Musculo-skelatal Disorder Father         ALS    Other (graves) Sister         sister    Breast Cancer Maternal Cousin Female 40     Patient Active Problem List    Diagnosis    Cough    Contraception management    Leg pain    Breast pain    Vitamin D deficiency    Type 2 diabetes mellitus with microalbuminuria, without long-term current use of insulin (HCC)    Intractable cyclical vomiting with nausea    Azotemia    Hyperglycemia    Acute cystitis without hematuria    Dehydration    Hypokalemia    Trichomoniasis    Uncontrolled diabetes mellitus with microalbuminuria    Vaginal discharge    Intractable nausea and vomiting    Acute upper GI bleeding    Thyroid nodule    Pancreatic cyst (HCC)    Vomiting without nausea    Primary hypertension    Tachycardia    Hospital discharge follow-up    Pancreatic lesion (HCC)    Moderate dysplasia of cervix (CATHLEEN II)    Type II diabetes mellitus (HCC)    Anemia    Smoking    Encounter for smoking cessation counseling    Irregular periods    Hot flashes    Type 2 diabetes mellitus with proteinuria (HCC)    Preoperative clearance    Type 2 diabetes mellitus with microalbuminuria (HCC)    Trichomonas vaginalis (TV) infection       REVIEW OF SYSTEMS:   A comprehensive 10 point review of systems was completed.  Pertinent positives and negatives noted in the the HPI            EXAM:   /84   Pulse 88   Ht 5' 3\" (1.6 m)   Wt 155 lb 6.4 oz (70.5 kg)   LMP 12/20/2023 (Approximate)   SpO2 98%   BMI 27.53 kg/m²   GENERAL: well developed, well nourished,in no apparent distress  SKIN: no rashes,no suspicious lesions  HEENT: atraumatic, normocephalic,ears and throat are clear  NECK: supple,no adenopathy  Breast : normal no lumps   LUNGS: clear to auscultation  CARDIO: RRR without murmur  GI: no masses, HSM or tenderness  EXTREMITIES: no cyanosis, clubbing or edema  Pelvic exam : normal   Pap done     NEURO: no gross deficits              No orders of the defined types were placed in this encounter.    No results found.         ASSESSMENT AND PLAN:     1. Screening for colon cancer  Follow up with GI     - US THYROID (CPT=76536); Future  -  EKG 12 Lead to be performed at [facility]; Future  - CARD ECHO 2D DOPPLER (CPT=93306); Future  - Cardio Referral - Internal  - CBC With Differential With Platelet; Future  - Comp Metabolic Panel (14); Future  - Hemoglobin A1C; Future  - Folic Acid Serum (Folate); Future  - Ferritin; Future  - Vitamin B12; Future  - Iron And Tibc; Future  - Vitamin D [E]; Future  - Lipid Panel; Future  - TSH W Reflex To Free T4; Future    2. Encounter for screening mammogram for malignant neoplasm of breast  Mammogram screening   - [facility] JUSTEN 2D+3D SCREENING BILAT (CPT=77067/75627); Future  - US THYROID (CPT=76536); Future  - EKG 12 Lead to be performed at [facility]; Future  - CARD ECHO 2D DOPPLER (CPT=93306); Future  - Cardio Referral - Internal  - CBC With Differential With Platelet; Future  - Comp Metabolic Panel (14); Future  - Hemoglobin A1C; Future  - Folic Acid Serum (Folate); Future  - Ferritin; Future  - Vitamin B12; Future  - Iron And Tibc; Future  - Vitamin D [E]; Future  - Lipid Panel; Future  - TSH W Reflex To Free T4; Future    3. Thyroid nodule  Us thyroid   - US THYROID (CPT=76536); Future  - EKG 12 Lead to be performed at [facility]; Future  - CARD ECHO 2D DOPPLER (CPT=93306); Future  - Cardio Referral - Internal  - CBC With Differential With Platelet; Future  - Comp Metabolic Panel (14); Future  - Hemoglobin A1C; Future  - Folic Acid Serum (Folate); Future  - Ferritin; Future  - Vitamin B12; Future  - Iron And Tibc; Future  - Vitamin D [E]; Future  - Lipid Panel; Future  - TSH W Reflex To Free T4; Future    4. Palpitation E/M  EKG   Echo   Cardiology referral   Cbc , iron , ferritin    - US THYROID (CPT=76536); Future  - EKG 12 Lead to be performed at [facility]; Future  - CARD ECHO 2D DOPPLER (CPT=93306); Future  - Cardio Referral - Internal  - CBC With Differential With Platelet; Future  - Comp Metabolic Panel (14); Future  - Hemoglobin A1C; Future  -  Folic Acid Serum (Folate); Future  - Ferritin; Future  - Vitamin B12; Future  - Iron And Tibc; Future  - Vitamin D [E]; Future  - Lipid Panel; Future  - TSH W Reflex To Free T4; Future    5. Vitamin D deficiency  Vitamin d   - CBC With Differential With Platelet; Future  - Comp Metabolic Panel (14); Future  - Hemoglobin A1C; Future  - Folic Acid Serum (Folate); Future  - Ferritin; Future  - Vitamin B12; Future  - Iron And Tibc; Future  - Vitamin D [E]; Future  - Lipid Panel; Future  - TSH W Reflex To Free T4; Future    6. Screening for cervical cancer  Pap smear   - telmisartan 40 MG Oral Tab; Take 1 tablet (40 mg total) by mouth daily.  Dispense: 90 tablet; Refill: 1  - ThinPrep PAP with HPV Reflex Request [E]; Future  - ThinPrep PAP with HPV Reflex Request [E]    7. Type 2 diabetes mellitus with microalbuminuria, without long-term current use of insulin (HCC)  Continue current medications   Most recent microalbumin level       Component  Ref Range & Units 5/25/24 11:20 AM   Microalbumin, Urine  mg/dL 182.90   Creatinine Ur Random  mg/dL 181.70   Malb/Cre Calc  <=30.0 ug/mg 1,006.6 High    Comment: <30 ug/mg creatinine       Normal    ug/mg creatinine   Microalbuminuria  >300 ug/mg creatinine      Albuminuria            8. Annual physical exam  Diet and exercise   Self breast exam   Sun screen recommended   Fasting blood work   Mammogram   Pap smear   Follow up with [facility] JUSTEN 2D+3D SCREENING BILAT (CPT=77067/84363); Future  - US THYROID (CPT=76536); Future  - EKG 12 Lead to be performed at [facility]; Future  - CARD ECHO 2D DOPPLER (CPT=93306); Future  - Cardio Referral - Internal  - CBC With Differential With Platelet; Future  - Comp Metabolic Panel (14); Future  - Hemoglobin A1C; Future  - Folic Acid Serum (Folate); Future  - Ferritin; Future  - Vitamin B12; Future  - Iron And Tibc; Future  - Vitamin D [E]; Future  - Lipid Panel; Future  - TSH W Reflex To Free T4; Future  - telmisartan  40 MG Oral Tab; Take 1 tablet (40 mg total) by mouth daily.  Dispense: 90 tablet; Refill: 1  - ThinPrep PAP with HPV Reflex Request [E]; Future    9. History of trichomoniasis  Trichomonas swab     - telmisartan 40 MG Oral Tab; Take 1 tablet (40 mg total) by mouth daily.  Dispense: 90 tablet; Refill: 1  - ThinPrep PAP with HPV Reflex Request [E]; Future  - ThinPrep PAP with HPV Reflex Request [E]  - Trichomonas Vaginitis by EVANGELINA [E]; Future     10-anemia : will check cbc , iron , ferritin, vitamin b12 , folic acid       Please return to the clinic if you are having persistent symptoms. If worsening symptoms should go to the ER    Nena Allen MD,   Diplomate of the American Board of Internal Medicine  Diplomate of the American Board of Obesity Medicine

## 2024-08-05 LAB — HPV E6+E7 MRNA CVX QL NAA+PROBE: NEGATIVE

## 2024-08-06 LAB — T VAGINALIS RRNA SPEC QL NAA+PROBE: POSITIVE

## 2024-08-07 RX ORDER — METRONIDAZOLE 500 MG/1
500 TABLET ORAL 2 TIMES DAILY
Qty: 14 TABLET | Refills: 0 | Status: SHIPPED | OUTPATIENT
Start: 2024-08-07 | End: 2024-08-14

## 2024-08-08 DIAGNOSIS — E55.9 VITAMIN D DEFICIENCY: ICD-10-CM

## 2024-08-08 DIAGNOSIS — K22.10 GASTROESOPHAGEAL JUNCTION EROSION: Primary | ICD-10-CM

## 2024-08-08 DIAGNOSIS — Z00.00 ANNUAL PHYSICAL EXAM: ICD-10-CM

## 2024-08-08 DIAGNOSIS — E61.1 IRON DEFICIENCY: ICD-10-CM

## 2024-08-08 DIAGNOSIS — K25.9 GASTROESOPHAGEAL JUNCTION EROSION: Primary | ICD-10-CM

## 2024-08-22 ENCOUNTER — HOSPITAL ENCOUNTER (OUTPATIENT)
Dept: MRI IMAGING | Facility: HOSPITAL | Age: 44
Discharge: HOME | End: 2024-08-22
Attending: INTERNAL MEDICINE
Payer: COMMERCIAL

## 2024-08-22 DIAGNOSIS — K86.2 PANCREATIC CYST (HCC): ICD-10-CM

## 2024-08-22 PROCEDURE — 74183 MRI ABD W/O CNTR FLWD CNTR: CPT | Performed by: INTERNAL MEDICINE

## 2024-08-22 PROCEDURE — A9575 INJ GADOTERATE MEGLUMI 0.1ML: HCPCS | Performed by: INTERNAL MEDICINE

## 2024-08-22 RX ORDER — GADOTERATE MEGLUMINE 376.9 MG/ML
15 INJECTION INTRAVENOUS
Status: COMPLETED | OUTPATIENT
Start: 2024-08-22 | End: 2024-08-22

## 2024-08-22 RX ADMIN — GADOTERATE MEGLUMINE 15 ML: 376.9 INJECTION INTRAVENOUS at 19:37:00

## 2024-08-26 ENCOUNTER — TELEPHONE (OUTPATIENT)
Facility: CLINIC | Age: 44
End: 2024-08-26

## 2024-08-26 NOTE — TELEPHONE ENCOUNTER
----- Message from Pauly Chavira sent at 8/26/2024  5:16 PM CDT -----  GI staff: please recall for MRI abd in 1 year.

## 2024-10-01 RX ORDER — SEMAGLUTIDE 2.68 MG/ML
2 INJECTION, SOLUTION SUBCUTANEOUS WEEKLY
Qty: 1 EACH | Refills: 3 | Status: SHIPPED | OUTPATIENT
Start: 2024-10-01

## 2024-10-10 ENCOUNTER — EKG ENCOUNTER (OUTPATIENT)
Dept: LAB | Age: 44
End: 2024-10-10
Attending: INTERNAL MEDICINE
Payer: COMMERCIAL

## 2024-10-10 ENCOUNTER — HOSPITAL ENCOUNTER (OUTPATIENT)
Age: 44
Discharge: HOME | End: 2024-10-10
Payer: COMMERCIAL

## 2024-10-10 ENCOUNTER — LAB ENCOUNTER (OUTPATIENT)
Dept: LAB | Age: 44
End: 2024-10-10
Attending: INTERNAL MEDICINE
Payer: COMMERCIAL

## 2024-10-10 VITALS
SYSTOLIC BLOOD PRESSURE: 157 MMHG | RESPIRATION RATE: 16 BRPM | TEMPERATURE: 98 F | HEART RATE: 85 BPM | OXYGEN SATURATION: 99 % | DIASTOLIC BLOOD PRESSURE: 75 MMHG

## 2024-10-10 DIAGNOSIS — E55.9 VITAMIN D DEFICIENCY: ICD-10-CM

## 2024-10-10 DIAGNOSIS — A59.01 TRICHOMONAS VAGINALIS (TV) INFECTION: ICD-10-CM

## 2024-10-10 DIAGNOSIS — Z00.00 ANNUAL PHYSICAL EXAM: ICD-10-CM

## 2024-10-10 DIAGNOSIS — Z12.11 SCREENING FOR COLON CANCER: ICD-10-CM

## 2024-10-10 DIAGNOSIS — Z12.31 ENCOUNTER FOR SCREENING MAMMOGRAM FOR MALIGNANT NEOPLASM OF BREAST: ICD-10-CM

## 2024-10-10 DIAGNOSIS — E61.1 IRON DEFICIENCY: ICD-10-CM

## 2024-10-10 DIAGNOSIS — K22.10 GASTROESOPHAGEAL JUNCTION EROSION: ICD-10-CM

## 2024-10-10 DIAGNOSIS — J02.0 STREP PHARYNGITIS: Primary | ICD-10-CM

## 2024-10-10 DIAGNOSIS — D64.9 ANEMIA, UNSPECIFIED TYPE: ICD-10-CM

## 2024-10-10 DIAGNOSIS — E04.1 THYROID NODULE: ICD-10-CM

## 2024-10-10 DIAGNOSIS — K25.9 GASTROESOPHAGEAL JUNCTION EROSION: ICD-10-CM

## 2024-10-10 DIAGNOSIS — R00.2 PALPITATION: ICD-10-CM

## 2024-10-10 LAB
ALBUMIN SERPL-MCNC: 4.1 G/DL (ref 3.2–4.8)
ALBUMIN/GLOB SERPL: 1.3 {RATIO} (ref 1–2)
ALP LIVER SERPL-CCNC: 63 U/L
ALT SERPL-CCNC: 29 U/L
ANION GAP SERPL CALC-SCNC: 5 MMOL/L (ref 0–18)
AST SERPL-CCNC: 25 U/L (ref ?–34)
ATRIAL RATE: 78 BPM
BASOPHILS # BLD AUTO: 0.03 X10(3) UL (ref 0–0.2)
BASOPHILS NFR BLD AUTO: 0.6 %
BILIRUB SERPL-MCNC: 0.6 MG/DL (ref 0.3–1.2)
BUN BLD-MCNC: 10 MG/DL (ref 9–23)
BUN/CREAT SERPL: 14.3 (ref 10–20)
CALCIUM BLD-MCNC: 9.1 MG/DL (ref 8.7–10.4)
CHLORIDE SERPL-SCNC: 108 MMOL/L (ref 98–112)
CHOLEST SERPL-MCNC: 205 MG/DL (ref ?–200)
CO2 SERPL-SCNC: 28 MMOL/L (ref 21–32)
CREAT BLD-MCNC: 0.7 MG/DL
DEPRECATED HBV CORE AB SER IA-ACNC: 86 NG/ML
DEPRECATED RDW RBC AUTO: 43.1 FL (ref 35.1–46.3)
EGFRCR SERPLBLD CKD-EPI 2021: 108 ML/MIN/1.73M2 (ref 60–?)
EOSINOPHIL # BLD AUTO: 0.11 X10(3) UL (ref 0–0.7)
EOSINOPHIL NFR BLD AUTO: 2.3 %
ERYTHROCYTE [DISTWIDTH] IN BLOOD BY AUTOMATED COUNT: 12.5 % (ref 11–15)
EST. AVERAGE GLUCOSE BLD GHB EST-MCNC: 111 MG/DL (ref 68–126)
FASTING PATIENT LIPID ANSWER: YES
FASTING STATUS PATIENT QL REPORTED: YES
FOLATE SERPL-MCNC: 8 NG/ML (ref 5.4–?)
GLOBULIN PLAS-MCNC: 3.1 G/DL (ref 2–3.5)
GLUCOSE BLD-MCNC: 85 MG/DL (ref 70–99)
GLUCOSE BLDC GLUCOMTR-MCNC: 93 MG/DL (ref 70–99)
HBA1C MFR BLD: 5.5 % (ref ?–5.7)
HBV SURFACE AG SER-ACNC: <0.1 [IU]/L
HBV SURFACE AG SERPL QL IA: NONREACTIVE
HCT VFR BLD AUTO: 35.5 %
HCV AB SERPL QL IA: NONREACTIVE
HDLC SERPL-MCNC: 71 MG/DL (ref 40–59)
HGB BLD-MCNC: 11.9 G/DL
IMM GRANULOCYTES # BLD AUTO: 0.01 X10(3) UL (ref 0–1)
IMM GRANULOCYTES NFR BLD: 0.2 %
IRON SATN MFR SERPL: 21 %
IRON SERPL-MCNC: 75 UG/DL
LDLC SERPL CALC-MCNC: 117 MG/DL (ref ?–100)
LYMPHOCYTES # BLD AUTO: 1.4 X10(3) UL (ref 1–4)
LYMPHOCYTES NFR BLD AUTO: 29.5 %
MCH RBC QN AUTO: 31.6 PG (ref 26–34)
MCHC RBC AUTO-ENTMCNC: 33.5 G/DL (ref 31–37)
MCV RBC AUTO: 94.2 FL
MONOCYTES # BLD AUTO: 0.42 X10(3) UL (ref 0.1–1)
MONOCYTES NFR BLD AUTO: 8.8 %
NEUTROPHILS # BLD AUTO: 2.78 X10 (3) UL (ref 1.5–7.7)
NEUTROPHILS # BLD AUTO: 2.78 X10(3) UL (ref 1.5–7.7)
NEUTROPHILS NFR BLD AUTO: 58.6 %
NONHDLC SERPL-MCNC: 134 MG/DL (ref ?–130)
OSMOLALITY SERPL CALC.SUM OF ELEC: 290 MOSM/KG (ref 275–295)
P AXIS: 66 DEGREES
P-R INTERVAL: 122 MS
PLATELET # BLD AUTO: 236 10(3)UL (ref 150–450)
POCT INFLUENZA A: NEGATIVE
POCT INFLUENZA B: NEGATIVE
POTASSIUM SERPL-SCNC: 4.1 MMOL/L (ref 3.5–5.1)
PROT SERPL-MCNC: 7.2 G/DL (ref 5.7–8.2)
Q-T INTERVAL: 386 MS
QRS DURATION: 84 MS
QTC CALCULATION (BEZET): 440 MS
R AXIS: 36 DEGREES
RBC # BLD AUTO: 3.77 X10(6)UL
S PYO AG THROAT QL IA.RAPID: POSITIVE
SARS-COV-2 RNA RESP QL NAA+PROBE: NOT DETECTED
SODIUM SERPL-SCNC: 141 MMOL/L (ref 136–145)
T AXIS: 59 DEGREES
T PALLIDUM AB SER QL IA: NONREACTIVE
TIBC SERPL-MCNC: 355 UG/DL (ref 250–425)
TRANSFERRIN SERPL-MCNC: 238 MG/DL (ref 250–380)
TRIGL SERPL-MCNC: 98 MG/DL (ref 30–149)
TSI SER-ACNC: 1.42 MIU/ML (ref 0.55–4.78)
VENTRICULAR RATE: 78 BPM
VIT B12 SERPL-MCNC: 676 PG/ML (ref 211–911)
VIT D+METAB SERPL-MCNC: 19.2 NG/ML (ref 30–100)
VLDLC SERPL CALC-MCNC: 17 MG/DL (ref 0–30)
WBC # BLD AUTO: 4.8 X10(3) UL (ref 4–11)

## 2024-10-10 PROCEDURE — 80050 GENERAL HEALTH PANEL: CPT | Performed by: INTERNAL MEDICINE

## 2024-10-10 PROCEDURE — 87340 HEPATITIS B SURFACE AG IA: CPT | Performed by: INTERNAL MEDICINE

## 2024-10-10 PROCEDURE — 99213 OFFICE O/P EST LOW 20 MIN: CPT

## 2024-10-10 PROCEDURE — 82962 GLUCOSE BLOOD TEST: CPT

## 2024-10-10 PROCEDURE — 87389 HIV-1 AG W/HIV-1&-2 AB AG IA: CPT | Performed by: INTERNAL MEDICINE

## 2024-10-10 PROCEDURE — 87651 STREP A DNA AMP PROBE: CPT | Performed by: PHYSICIAN ASSISTANT

## 2024-10-10 PROCEDURE — 82306 VITAMIN D 25 HYDROXY: CPT | Performed by: INTERNAL MEDICINE

## 2024-10-10 PROCEDURE — 86780 TREPONEMA PALLIDUM: CPT | Performed by: INTERNAL MEDICINE

## 2024-10-10 PROCEDURE — 87502 INFLUENZA DNA AMP PROBE: CPT | Performed by: PHYSICIAN ASSISTANT

## 2024-10-10 PROCEDURE — 87491 CHLMYD TRACH DNA AMP PROBE: CPT | Performed by: INTERNAL MEDICINE

## 2024-10-10 PROCEDURE — 99214 OFFICE O/P EST MOD 30 MIN: CPT

## 2024-10-10 PROCEDURE — 87591 N.GONORRHOEAE DNA AMP PROB: CPT | Performed by: INTERNAL MEDICINE

## 2024-10-10 PROCEDURE — 82607 VITAMIN B-12: CPT | Performed by: INTERNAL MEDICINE

## 2024-10-10 PROCEDURE — 84466 ASSAY OF TRANSFERRIN: CPT | Performed by: INTERNAL MEDICINE

## 2024-10-10 PROCEDURE — 82746 ASSAY OF FOLIC ACID SERUM: CPT | Performed by: INTERNAL MEDICINE

## 2024-10-10 PROCEDURE — 82728 ASSAY OF FERRITIN: CPT | Performed by: INTERNAL MEDICINE

## 2024-10-10 PROCEDURE — 86803 HEPATITIS C AB TEST: CPT | Performed by: INTERNAL MEDICINE

## 2024-10-10 PROCEDURE — 83036 HEMOGLOBIN GLYCOSYLATED A1C: CPT | Performed by: INTERNAL MEDICINE

## 2024-10-10 PROCEDURE — 83540 ASSAY OF IRON: CPT | Performed by: INTERNAL MEDICINE

## 2024-10-10 PROCEDURE — 80061 LIPID PANEL: CPT | Performed by: INTERNAL MEDICINE

## 2024-10-10 RX ORDER — AMOXICILLIN 500 MG/1
500 TABLET, FILM COATED ORAL 3 TIMES DAILY
Qty: 30 TABLET | Refills: 0 | Status: SHIPPED | OUTPATIENT
Start: 2024-10-10 | End: 2024-10-20

## 2024-10-10 RX ORDER — BENZOCAINE AND MENTHOL, UNSPECIFIED FORM 15; 2.3 MG/1; MG/1
1 LOZENGE ORAL 3 TIMES DAILY PRN
Qty: 16 LOZENGE | Refills: 0 | Status: SHIPPED | OUTPATIENT
Start: 2024-10-10

## 2024-10-10 NOTE — ED INITIAL ASSESSMENT (HPI)
Scratchy throat on Tuesday, headache, sinus congestion, body aches started yesterday, no cough, no fever

## 2024-10-10 NOTE — ED PROVIDER NOTES
Chief Complaint   Patient presents with    Sore Throat       HPI:     Barbara Zimmer is a 44 year old female who presents for evaluation of sore throat and sinus pressure with mild congestion headache over the last 3 days.  Notes sick contacts include family members without medical management or diagnosis improving.  Denies recent illness or exposure to strep coronavirus or influenza.  Denies history of previous sinusitis or recent antibiotic.  Denies associated dizziness earache dysphagia neck pain chest pain shortness of breath cough abdominal pain vomiting diarrhea dysuria or rash.  Patient is a type II diabetic on Ozempic.  No blood sugar check in the last few days agreeable to Accu-Chek on arrival.      PFSH    PFSH asessment screens reviewed and agree.  Nurses notes reviewed I agree with documentation.    Family History   Problem Relation Age of Onset    Hypertension Mother     Diabetes Father     Musculo-skelatal Disorder Father         ALS    Other (graves) Sister         sister    Breast Cancer Maternal Cousin Female 40     Family history reviewed with patient/caregiver and is not pertinent to presenting problem.  Social History     Socioeconomic History    Marital status: Life Partner     Spouse name: Not on file    Number of children: Not on file    Years of education: Not on file    Highest education level: Not on file   Occupational History    Not on file   Tobacco Use    Smoking status: Some Days     Current packs/day: 0.50     Types: Cigarettes    Smokeless tobacco: Never    Tobacco comments:     1 cigarette q3 days   Vaping Use    Vaping status: Never Used   Substance and Sexual Activity    Alcohol use: Yes     Alcohol/week: 6.0 standard drinks of alcohol     Types: 6 Cans of beer per week    Drug use: Yes     Types: Cannabis     Comment: three times a week    Sexual activity: Not on file   Other Topics Concern     Service Not Asked    Blood Transfusions Not Asked    Caffeine Concern Yes      Comment: 2 cups coffee daily    Occupational Exposure Not Asked    Hobby Hazards Not Asked    Sleep Concern Not Asked    Stress Concern Not Asked    Weight Concern Not Asked    Special Diet Not Asked    Back Care Not Asked    Exercise Not Asked    Bike Helmet Not Asked    Seat Belt Not Asked    Self-Exams Not Asked   Social History Narrative    Not on file     Social Drivers of Health     Financial Resource Strain: Not on file   Food Insecurity: Not on file   Transportation Needs: Not on file   Physical Activity: Not on file   Stress: Not on file   Social Connections: Not on file   Housing Stability: Not on file         ROS:   Positive for stated complaint: Sore throat headache sinus pressure.  All other systems reviewed and negative except as noted above.  Constitutional and Vital Signs Reviewed.      Physical Exam:     Findings:    /75   Pulse 85   Temp 98.3 °F (36.8 °C) (Temporal)   Resp 16   LMP 12/20/2023 (Approximate)   SpO2 99%   GENERAL: well developed, well nourished, well hydrated, no distress  SKIN: good skin turgor, no obvious rashes  NECK: No nuchal rigidity or palpable cervical lymphadenopathy.    EXTREMITIES: no cyanosis or edema. GELLER without difficulty  GI: soft, non-tender, normal bowel sounds  HEAD: normocephalic, atraumatic  EYES: sclera non icteric bilateral, conjunctiva clear  EARS: TMs clear bilaterally. Canals clear.  NOSE: Mild rhinorrhea.  MMM care mild maxillary sinus tenderness.  Nasal turbinates: pink, normal mucosa  THROAT: mild erythema posterior pharynx, nonreactive tonsils.  Without exudates, uvula midline, and airway patent  LUNGS: No retractions.  Clear to auscultation bilaterally; no rales, rhonchi, or wheezes  NEURO: No focal deficits  PSYCH: Alert and oriented x3.  Answering questions appropriately.  Mood appropriate.    MDM/Assessment/Plan:   Orders for this encounter:    Orders Placed This Encounter    POCT Glucose    Rapid SARS-CoV-2 by PCR     Order Specific  Question:   Release to patient     Answer:   Immediate    POCT Flu Test     Order Specific Question:   Release to patient     Answer:   Immediate    Rapid Strep A - ID NOW     Order Specific Question:   Release to patient     Answer:   Immediate    Accucheck    amoxicillin 500 MG Oral Tab     Sig: Take 1 tablet (500 mg total) by mouth 3 (three) times daily for 10 days.     Dispense:  30 tablet     Refill:  0    Benzocaine-Menthol (CEPACOL) 15-2.3 MG Mouth/Throat Lozenge     Sig: Use as directed 1 lozenge in the mouth or throat 3 (three) times daily as needed.     Dispense:  16 lozenge     Refill:  0       Labs performed this visit:  Recent Results (from the past 10 hours)   Rapid SARS-CoV-2 by PCR    Collection Time: 10/10/24  8:16 AM    Specimen: Nares; Other   Result Value Ref Range    Rapid SARS-CoV-2 by PCR Not Detected Not Detected   POCT Flu Test    Collection Time: 10/10/24  8:16 AM    Specimen: Nares; Other   Result Value Ref Range    POCT INFLUENZA A Negative Negative    POCT INFLUENZA B Negative Negative   Rapid Strep A - ID NOW    Collection Time: 10/10/24  8:17 AM    Specimen: Throat; Other   Result Value Ref Range    Strep A by PCR Positive (A) Negative   POCT Glucose    Collection Time: 10/10/24  8:23 AM   Result Value Ref Range    POC Glucose  93 70 - 99 mg/dL       MDM:  Strep positive, coronavirus influenza negative.  Patient agrees with antibiotics with supportive agents for management of symptoms, instructed on home care and indications to readdress outpatient/emergently versus return.    Diagnosis:    ICD-10-CM    1. Strep pharyngitis  J02.0           All results reviewed and discussed with patient.  See AVS for detailed discharge instructions for your condition today.    Follow Up with:  Nena Allen MD  [address] [place] [id number]  [phone]    Schedule an appointment as soon as possible for a visit in 3 days  As needed, If symptoms worsen

## 2024-10-11 LAB
C TRACH DNA SPEC QL NAA+PROBE: NEGATIVE
N GONORRHOEA DNA SPEC QL NAA+PROBE: NEGATIVE

## 2024-10-13 DIAGNOSIS — D64.9 ANEMIA, UNSPECIFIED TYPE: Primary | ICD-10-CM

## 2024-10-13 RX ORDER — ROSUVASTATIN CALCIUM 20 MG/1
20 TABLET, COATED ORAL NIGHTLY
Qty: 90 TABLET | Refills: 1 | Status: SHIPPED | OUTPATIENT
Start: 2024-10-13 | End: 2025-01-11

## 2024-10-13 RX ORDER — ERGOCALCIFEROL 1.25 MG/1
50000 CAPSULE, LIQUID FILLED ORAL WEEKLY
Qty: 12 CAPSULE | Refills: 1 | Status: SHIPPED | OUTPATIENT
Start: 2024-10-13 | End: 2024-12-30

## 2024-10-19 ENCOUNTER — HOSPITAL ENCOUNTER (OUTPATIENT)
Dept: CV DIAGNOSTICS | Facility: HOSPITAL | Age: 44
Discharge: HOME | End: 2024-10-19
Attending: INTERNAL MEDICINE
Payer: COMMERCIAL

## 2024-10-19 DIAGNOSIS — E04.1 THYROID NODULE: ICD-10-CM

## 2024-10-19 DIAGNOSIS — Z12.11 SCREENING FOR COLON CANCER: ICD-10-CM

## 2024-10-19 DIAGNOSIS — R00.2 PALPITATION: ICD-10-CM

## 2024-10-19 DIAGNOSIS — Z00.00 ANNUAL PHYSICAL EXAM: ICD-10-CM

## 2024-10-19 DIAGNOSIS — Z12.31 ENCOUNTER FOR SCREENING MAMMOGRAM FOR MALIGNANT NEOPLASM OF BREAST: ICD-10-CM

## 2024-10-19 PROCEDURE — 93356 MYOCRD STRAIN IMG SPCKL TRCK: CPT | Performed by: INTERNAL MEDICINE

## 2024-10-19 PROCEDURE — 93306 TTE W/DOPPLER COMPLETE: CPT | Performed by: INTERNAL MEDICINE

## 2024-11-14 ENCOUNTER — TELEPHONE (OUTPATIENT)
Dept: ENDOCRINOLOGY CLINIC | Facility: CLINIC | Age: 44
End: 2024-11-14

## 2024-11-14 NOTE — TELEPHONE ENCOUNTER
Called pt to r/s appt alvin Cha due to provider schedule change. Pt was unable to take any sooner appts than 02/04 because she needs early evening hours 4pm or later. She took the appt but was concerned that she would not be able to have her Ozempic filled if she waited so long for another f/up.     Please call patient to discuss.     *  Preferred pharmacy confirmed.

## 2024-12-24 ENCOUNTER — OFFICE VISIT (OUTPATIENT)
Dept: INTERNAL MEDICINE CLINIC | Facility: CLINIC | Age: 44
End: 2024-12-24
Payer: COMMERCIAL

## 2024-12-24 VITALS
WEIGHT: 158.19 LBS | BODY MASS INDEX: 28.03 KG/M2 | HEART RATE: 88 BPM | OXYGEN SATURATION: 98 % | DIASTOLIC BLOOD PRESSURE: 78 MMHG | SYSTOLIC BLOOD PRESSURE: 130 MMHG | HEIGHT: 63 IN

## 2024-12-24 DIAGNOSIS — I10 PRIMARY HYPERTENSION: ICD-10-CM

## 2024-12-24 DIAGNOSIS — E78.5 HYPERLIPIDEMIA, UNSPECIFIED HYPERLIPIDEMIA TYPE: ICD-10-CM

## 2024-12-24 DIAGNOSIS — R80.9 TYPE 2 DIABETES MELLITUS WITH MICROALBUMINURIA, WITHOUT LONG-TERM CURRENT USE OF INSULIN (HCC): Primary | ICD-10-CM

## 2024-12-24 DIAGNOSIS — E04.1 THYROID NODULE: ICD-10-CM

## 2024-12-24 DIAGNOSIS — K86.2 PANCREATIC CYST (HCC): ICD-10-CM

## 2024-12-24 DIAGNOSIS — E11.29 TYPE 2 DIABETES MELLITUS WITH MICROALBUMINURIA, WITHOUT LONG-TERM CURRENT USE OF INSULIN (HCC): Primary | ICD-10-CM

## 2024-12-24 DIAGNOSIS — E55.9 VITAMIN D DEFICIENCY: ICD-10-CM

## 2024-12-24 PROCEDURE — 3008F BODY MASS INDEX DOCD: CPT | Performed by: INTERNAL MEDICINE

## 2024-12-24 PROCEDURE — 3078F DIAST BP <80 MM HG: CPT | Performed by: INTERNAL MEDICINE

## 2024-12-24 PROCEDURE — 3075F SYST BP GE 130 - 139MM HG: CPT | Performed by: INTERNAL MEDICINE

## 2024-12-24 PROCEDURE — 99214 OFFICE O/P EST MOD 30 MIN: CPT | Performed by: INTERNAL MEDICINE

## 2024-12-24 PROCEDURE — 3044F HG A1C LEVEL LT 7.0%: CPT | Performed by: INTERNAL MEDICINE

## 2024-12-24 RX ORDER — ERGOCALCIFEROL 1.25 MG/1
50000 CAPSULE, LIQUID FILLED ORAL WEEKLY
Qty: 12 CAPSULE | Refills: 1 | Status: SHIPPED | OUTPATIENT
Start: 2024-12-24 | End: 2025-03-12

## 2024-12-24 NOTE — PROGRESS NOTES
Barbara Zimmer is a 44 year old female.    Chief complaint: follow up on chronic conditions       HPI:     Barbara Zimmer is a 44 year old pleasant female who presents for   Dm   On ozempic 2 mg   Still seeing the endocrinologist   Reports that she gained some weight           HTN   Telmisartan   40 mg       HL   Hasnot started statin yet         Thyroid nodule   Didn't have the chance to schedule us thyroid           Current Outpatient Medications   Medication Sig Dispense Refill    semaglutide (OZEMPIC, 2 MG/DOSE,) 8 MG/3ML Subcutaneous Solution Pen-injector Inject 2 mg into the skin once a week. 9 mL 1    telmisartan 40 MG Oral Tab Take 1 tablet (40 mg total) by mouth daily. 90 tablet 0    ergocalciferol 1.25 MG (58537 UT) Oral Cap Take 1 capsule (50,000 Units total) by mouth once a week for 12 doses. (Patient not taking: Reported on 12/24/2024) 12 capsule 1    rosuvastatin 20 MG Oral Tab Take 1 tablet (20 mg total) by mouth nightly. (Patient not taking: Reported on 12/24/2024) 90 tablet 1    Benzocaine-Menthol (CEPACOL) 15-2.3 MG Mouth/Throat Lozenge Use as directed 1 lozenge in the mouth or throat 3 (three) times daily as needed. (Patient not taking: Reported on 12/24/2024) 16 lozenge 0    semaglutide (OZEMPIC, 2 MG/DOSE,) 8 MG/3ML Subcutaneous Solution Pen-injector Inject 2 mg into the skin once a week. (Patient not taking: Reported on 12/24/2024) 1 each 3    Continuous Glucose  (FREESTYLE CHAYA 2 READER) Does not apply Device 1 Device every 14 (fourteen) days. (Patient not taking: Reported on 12/24/2024)      Meloxicam 15 MG Oral Tab Take 1 tablet (15 mg total) by mouth daily with food. (Patient not taking: Reported on 12/24/2024)      rosuvastatin 20 MG Oral Tab  (Patient not taking: Reported on 12/24/2024)      Na Sulfate-K Sulfate-Mg Sulf (SUPREP BOWEL PREP KIT) 17.5-3.13-1.6 GM/177ML Oral Solution Take as directed (Patient not taking: Reported on 12/24/2024) 177 mL 0    buPROPion 150 MG Oral  Tablet 12 Hr Take 1 tablet (150 mg total) by mouth 2 (two) times daily. (Patient not taking: Reported on 2024) 60 tablet 2      Past Medical History:    Anxiety state    Diabetes (HCC)    Diabetes mellitus (HCC)    Essential hypertension    High blood pressure    High cholesterol    Hyperlipidemia    Intractable nausea and vomiting    Uncontrolled diabetes mellitus with microalbuminuria    Visual impairment     Past Surgical History:   Procedure Laterality Date          Cholecystectomy      Egd      [facility]             Family History   Problem Relation Age of Onset    Hypertension Mother     Diabetes Father     Musculo-skelatal Disorder Father         ALS    Other (graves) Sister         sister    Breast Cancer Maternal Cousin Female 40     Patient Active Problem List   Diagnosis    Cough    Contraception management    Leg pain    Breast pain    Vitamin D deficiency    Type 2 diabetes mellitus with microalbuminuria, without long-term current use of insulin (HCC)    Intractable cyclical vomiting with nausea    Azotemia    Hyperglycemia    Acute cystitis without hematuria    Dehydration    Hypokalemia    Trichomoniasis    Uncontrolled diabetes mellitus with microalbuminuria    Vaginal discharge    Intractable nausea and vomiting    Acute upper GI bleeding    Thyroid nodule    Pancreatic cyst (HCC)    Vomiting without nausea    Primary hypertension    Tachycardia    Hospital discharge follow-up    Pancreatic lesion (HCC)    Moderate dysplasia of cervix (CATHLEEN II)    Type II diabetes mellitus (HCC)    Anemia    Smoking    Encounter for smoking cessation counseling    Irregular periods    Hot flashes    Type 2 diabetes mellitus with proteinuria (HCC)    Preoperative clearance    Type 2 diabetes mellitus with microalbuminuria (HCC)    Trichomonas vaginalis (TV) infection    Screening for cervical cancer    Palpitation    Encounter for screening mammogram for malignant neoplasm of breast    Screening  for colon cancer       REVIEW OF SYSTEMS:   A comprehensive 10 point review of systems was completed.  Pertinent positives and negatives noted in the the HPI            EXAM:   /80   Pulse 88   Ht 5' 3\" (1.6 m)   Wt 158 lb 3.2 oz (71.8 kg)   LMP 12/20/2023 (Approximate)   SpO2 98%   BMI 28.02 kg/m²   GENERAL: well developed, well nourished,in no apparent distress    LUNGS: clear to auscultation  CARDIO: RRR without murmur               No orders of the defined types were placed in this encounter.    No results found.         ASSESSMENT AND PLAN:   1. Type 2 diabetes mellitus with microalbuminuria, without long-term current use of insulin (HCC)  Continue ozempic   Consider swithing to Mounjaro if she continues to gain weight   - ergocalciferol 1.25 MG (95757 UT) Oral Cap; Take 1 capsule (50,000 Units total) by mouth once a week for 12 doses.  Dispense: 12 capsule; Refill: 1  - Hemoglobin A1C (Glycohemoglobin) [E]; Future  - Lipid Panel [E]; Future    2. Pancreatic cyst (HCC)  Had MRI abdomen follow up   - ergocalciferol 1.25 MG (98418 UT) Oral Cap; Take 1 capsule (50,000 Units total) by mouth once a week for 12 doses.  Dispense: 12 capsule; Refill: 1  - Hemoglobin A1C (Glycohemoglobin) [E]; Future  - Lipid Panel [E]; Future    3. Thyroid nodule  Advised to schedule us thyroid     - ergocalciferol 1.25 MG (59396 UT) Oral Cap; Take 1 capsule (50,000 Units total) by mouth once a week for 12 doses.  Dispense: 12 capsule; Refill: 1  - Hemoglobin A1C (Glycohemoglobin) [E]; Future  - Lipid Panel [E]; Future    4. Primary hypertension  Continue current medications     - ergocalciferol 1.25 MG (11875 UT) Oral Cap; Take 1 capsule (50,000 Units total) by mouth once a week for 12 doses.  Dispense: 12 capsule; Refill: 1  - Hemoglobin A1C (Glycohemoglobin) [E]; Future  - Lipid Panel [E]; Future    5. Hyperlipidemia, unspecified hyperlipidemia type  Advised to take statin   - ergocalciferol 1.25 MG (49438 UT) Oral Cap;  Take 1 capsule (50,000 Units total) by mouth once a week for 12 doses.  Dispense: 12 capsule; Refill: 1  - Hemoglobin A1C (Glycohemoglobin) [E]; Future  - Lipid Panel [E]; Future    6. Vitamin D deficiency  Ergocalciferol weekly x 6 months      Please return to the clinic if you are having persistent symptoms. If worsening symptoms should go to the ER    Nena Allen MD,   Diplomate of the American Board of Internal Medicine  Diplomate of the American Board of Obesity Medicine

## 2025-01-09 ENCOUNTER — TELEPHONE (OUTPATIENT)
Facility: CLINIC | Age: 45
End: 2025-01-09

## 2025-02-04 ENCOUNTER — OFFICE VISIT (OUTPATIENT)
Dept: ENDOCRINOLOGY CLINIC | Facility: CLINIC | Age: 45
End: 2025-02-04
Payer: COMMERCIAL

## 2025-02-04 VITALS
BODY MASS INDEX: 29 KG/M2 | DIASTOLIC BLOOD PRESSURE: 84 MMHG | SYSTOLIC BLOOD PRESSURE: 178 MMHG | HEART RATE: 95 BPM | WEIGHT: 162 LBS

## 2025-02-04 DIAGNOSIS — I10 PRIMARY HYPERTENSION: ICD-10-CM

## 2025-02-04 DIAGNOSIS — R80.9 TYPE 2 DIABETES MELLITUS WITH MICROALBUMINURIA, WITHOUT LONG-TERM CURRENT USE OF INSULIN (HCC): Primary | ICD-10-CM

## 2025-02-04 DIAGNOSIS — E11.29 TYPE 2 DIABETES MELLITUS WITH MICROALBUMINURIA, WITHOUT LONG-TERM CURRENT USE OF INSULIN (HCC): Primary | ICD-10-CM

## 2025-02-04 DIAGNOSIS — E78.5 HYPERLIPIDEMIA, UNSPECIFIED HYPERLIPIDEMIA TYPE: ICD-10-CM

## 2025-02-04 LAB
GLUCOSE BLOOD: 125
HEMOGLOBIN A1C: 5.5 % (ref 4.3–5.6)
TEST STRIP LOT #: NORMAL NUMERIC

## 2025-02-04 PROCEDURE — 82947 ASSAY GLUCOSE BLOOD QUANT: CPT | Performed by: NURSE PRACTITIONER

## 2025-02-04 PROCEDURE — 3044F HG A1C LEVEL LT 7.0%: CPT | Performed by: NURSE PRACTITIONER

## 2025-02-04 PROCEDURE — 3077F SYST BP >= 140 MM HG: CPT | Performed by: NURSE PRACTITIONER

## 2025-02-04 PROCEDURE — 83036 HEMOGLOBIN GLYCOSYLATED A1C: CPT | Performed by: NURSE PRACTITIONER

## 2025-02-04 PROCEDURE — 3079F DIAST BP 80-89 MM HG: CPT | Performed by: NURSE PRACTITIONER

## 2025-02-04 PROCEDURE — 99214 OFFICE O/P EST MOD 30 MIN: CPT | Performed by: NURSE PRACTITIONER

## 2025-02-04 RX ORDER — TIRZEPATIDE 10 MG/.5ML
10 INJECTION, SOLUTION SUBCUTANEOUS WEEKLY
Qty: 90 ML | Refills: 0 | Status: SHIPPED | OUTPATIENT
Start: 2025-02-04

## 2025-02-04 NOTE — PATIENT INSTRUCTIONS
A1C: 5.5% today --> previously was 5.5% on 3/20/2024  Blood glucose: 125 in clinic today    Medications:   - stop Ozempic  - start Mounjaro 10mg once weekly Mondays   - start taking Atorvastatin 20mg nightly    --> repeat lipid panel in 3 months     - repeat annual labs -- no fasting needed   - continue to follow low carb diet   - continue to stay active with walking as much as able       Weight:  Wt Readings from Last 6 Encounters:   02/04/25 162 lb (73.5 kg)   12/24/24 158 lb 3.2 oz (71.8 kg)   07/31/24 155 lb 6.4 oz (70.5 kg)   05/25/24 151 lb 12.8 oz (68.9 kg)   03/20/24 155 lb 3.2 oz (70.4 kg)   12/11/23 152 lb (68.9 kg)     A1C goal:  <6.5%    Blood sugar testing:  Test your blood sugar 1 time daily   Recommended times to test: alternat with before breakfast (fasting) or 2hrs after meals     Blood sugar targets:  Before breakfast:  (preferably < 110)  Before meals OR 2 hours after meals: <180 (preferably <150)     Call for persistent blood sugars < 75 or > 200

## 2025-02-04 NOTE — PROGRESS NOTES
Name: Barbara Zimmer  Date: 2/4/2025    CHIEF COMPLAINT   F/u on T2DM and HTN      HISTORY OF PRESENT ILLNESS   Barbara Zimmer is a 44 year old female who presents for follow up on diabetes management.   Hgb A1C: 5.5% at POC today. Previously was 5.5% on 10/10/2024.   Blood glucose: 125 in clinic today.   Patient verbalizes to have been feeling well and has been compliant with low carb diet and DM medication as prescribed.   Since last office visit, she did have complications/worsening of right eye retinopathy and had laser surgery with Dr. Lerner. Reports that her vision has improved now.     FAMILY HISTORY OF DIABETES  -father   DIABETES HISTORY  Diagnosed: around 25 years ago (2001? 2004?)   Prior HbA, C or glycohemoglobin were 12.6% on 9/3/2021; 7.3% 10/20/21; 6.1% 1/7/2022; 5.9% 7/19/2022; 5.5% 1/24/2023; 5.4% 8/1/2023; 6.4% 3/2024; 5.5% 3/20/2024; 5.5% at POC today;     Patient has had hospitalizations for blood sugar issues.   Denies any history of pancreatitis.     PREVIOUS MEDICATION FOR DM:  -Metformin (both IR and ER) -d/c'ed due to diarrhea s/e  -Janumet- d/c'ed due to diarrhea s/e    CURRENT MEDICATIONS FOR DM:  - ozempic 2mg once weekly - tolerating well; denies GI s/e     HOME GLUCOSE READINGS:   Has not been checking BG readings at home in the past few months.   - not wearing CGM.       HISTORY OF DIABETES COMPLICATIONS:  History of Retinopathy: yes - last eye exam within the last 12 months: yes - followed by Dr. Lerner  History of Neuropathy: yes   History of Nephropathy: no     ASSOCIATED COMPLICATIONS:   HTN: yes   Hyperlipidemia: yes  Cardiovascular Disease: no   Peripheral Vascular Disease: no     DIETARY COMPLIANCE:  Good; following a low carb diet     EXERCISE:   no-however has been staying active with 5-year-old granddaughter   -She usually is very active in the warmer weather months    REVIEW OF SYSTEMS  Constitutional: Negative for: weight change, fever, fatigue, cold/heat  intolerance  Eyes: Negative for:  Visual changes, proptosis, blurring  ENT: Negative for:  dysphagia, neck swelling, dysphonia  Respiratory: Negative for: hemoptysis, shortness of breath, cough, or dyspnea.  Cardiovascular: Negative for:  chest pain, chest discomfort, palpitations  GI: Negative for:  abdominal pain, nausea, vomiting, diarrhea, heartburn, constipation  Neurology: Negative for: headache, dizziness, syncope, numbness/tingling, or weakness.   Genito-Urinary: Negative for: dysuria, frequency or hematuria   Hematology/Lymphatics: Negative for: bruising, easy bleeding, lower extremity edema  Skin: Negative for: rash, blister, infection or ulcers.    Endocrine: Negative for: polyuria, polydipsia.  No osteoporosis.   Thyroid disease: yes tyroid nodule; TSH at goal recently; does not take thyroid medications    MEDICATIONS:     Current Outpatient Medications:     ergocalciferol 1.25 MG (65789 UT) Oral Cap, Take 1 capsule (50,000 Units total) by mouth once a week for 12 doses., Disp: 12 capsule, Rfl: 1    Benzocaine-Menthol (CEPACOL) 15-2.3 MG Mouth/Throat Lozenge, Use as directed 1 lozenge in the mouth or throat 3 (three) times daily as needed. (Patient not taking: Reported on 12/24/2024), Disp: 16 lozenge, Rfl: 0    semaglutide (OZEMPIC, 2 MG/DOSE,) 8 MG/3ML Subcutaneous Solution Pen-injector, Inject 2 mg into the skin once a week. (Patient not taking: Reported on 12/24/2024), Disp: 1 each, Rfl: 3    Continuous Glucose  (FREESTYLE CHAYA 2 READER) Does not apply Device, 1 Device every 14 (fourteen) days. (Patient not taking: Reported on 12/24/2024), Disp: , Rfl:     Meloxicam 15 MG Oral Tab, Take 1 tablet (15 mg total) by mouth daily with food. (Patient not taking: Reported on 12/24/2024), Disp: , Rfl:     rosuvastatin 20 MG Oral Tab, , Disp: , Rfl:     Na Sulfate-K Sulfate-Mg Sulf (SUPREP BOWEL PREP KIT) 17.5-3.13-1.6 GM/177ML Oral Solution, Take as directed (Patient not taking: Reported on  2024), Disp: 177 mL, Rfl: 0    semaglutide (OZEMPIC, 2 MG/DOSE,) 8 MG/3ML Subcutaneous Solution Pen-injector, Inject 2 mg into the skin once a week., Disp: 9 mL, Rfl: 1    telmisartan 40 MG Oral Tab, Take 1 tablet (40 mg total) by mouth daily., Disp: 90 tablet, Rfl: 0    buPROPion 150 MG Oral Tablet 12 Hr, Take 1 tablet (150 mg total) by mouth 2 (two) times daily. (Patient not taking: Reported on 2024), Disp: 60 tablet, Rfl: 2    ALLERGIES:   Allergies   Allergen Reactions    Amlodipine SWELLING     feet       SOCIAL HISTORY:   Social History     Socioeconomic History    Marital status: Life Partner   Tobacco Use    Smoking status: Some Days     Current packs/day: 0.50     Types: Cigarettes    Smokeless tobacco: Never    Tobacco comments:     1 cigarette q3 days   Vaping Use    Vaping status: Never Used   Substance and Sexual Activity    Alcohol use: Yes     Alcohol/week: 6.0 standard drinks of alcohol     Types: 6 Cans of beer per week    Drug use: Yes     Types: Cannabis     Comment: three times a week   Other Topics Concern    Caffeine Concern Yes     Comment: 2 cups coffee daily       PAST MEDICAL HISTORY:   Past Medical History:    Anxiety state    Diabetes (HCC)    Diabetes mellitus (HCC)    Essential hypertension    High blood pressure    High cholesterol    Hyperlipidemia    Intractable nausea and vomiting    Uncontrolled diabetes mellitus with microalbuminuria    Visual impairment       PAST SURGICAL HISTORY:   Past Surgical History:   Procedure Laterality Date          Cholecystectomy      Egd      [facility]       PHYSICAL EXAM:   Vitals:    25 1552 25 1705   BP: (!) 194/91 (!) 178/84   Pulse: 95    Weight: 162 lb (73.5 kg)        BMI:   Body mass index is 28.7 kg/m².    General Appearance:  alert, well developed, in no acute distress  Nutritional:  no extreme weight gain or loss  Head: Atraumatic  Eyes:  normal conjunctivae, sclera., normal sclera and normal  pupils  Throat/Neck: normal sound to voice. Normal hearing, normal speech  Back: no kyphosis  Respiratory:  Speaking in full sentences, non-labored. no increased work of breathing, no audible wheezing    Skin:  normal moisture and skin texture, no visible lesions  Hair and nails: normal scalp hair  Hematologic:  no excessive bruising  Neuro: motor grossly intact, moving all extremities without difficulty  Psychiatric:  oriented to time, self, and place  Extremities: no obvious extremity swelling, no lesions      Diabetic foot exam:   Bilateral barefoot skin diabetic exam is normal, visualized feet and the appearance is normal.  Bilateral monofilament/sensation of both feet is normal.  Pulsation pedal pulse exam of both lower legs/feet is normal as well.      LABS: Pertinent labs reviewed    ASSESSMENT/PLAN:    -Reviewed with patient the pathogenesis of diabetes, clinical significance of A1c, and common complications such as: microvascular, macrovascular and diabetic ketoacidosis. Patient verbalizes understanding of the importance of glycemic control and the goals of therapy.   -Discussed with patient glucose targets ranges (Fasting  and post prandial <180).     1.Type 2 Diabetes Mellitus, controlled  -LAB DATA  HbA,C: 5.5% today  a) Medications  - stop Ozempic  - start Mounjaro 10mg once weekly - Risks and benefits reviewed. Verbalizes understanding.    - reviewed Mounjaro administration technique in the office today  - discussed to cont. Following a low carb diet  -discussed to increase exercise as much as safely able with goal 5x weekly of a min of 30 mins each session.   -reviewed target goal BG readings and A1C  -reviewed when to notify me of abnormal BG readings.     b) No nephropathy: GFR: 108 on 10/10/2024 and urine MA: 1,006.6 on 5/25/2024 --> repeat urine MA   --> has been referred to nephrology in the past, however she has not scheduled apt.   c) UTD with optho -followed by    D)foot exam:  normal today 2025  d) Life style changes reviewed     2. Hypertension    -on Telmisartan 40mg once daily at night time -- has been taking inconsistently  - has not been checking BP readings at home; does have a machine.   - reviewed BP goal of <130/80  - repeat BP improved, however continues to be above goal; patient denies s/e of HTN today;denies chest pressure/SOB/HA/ numbness or tingling   - discussed to recheck once she gets home and if readings continues to stay above goal to take a second tablet of telmisartan   - reviewed importance of taking HTN medication daily; viewed risks with chronically uncontrolled HTN  - encouraged to follow up with cardiology as referred by PCP    3. Hyperlipidemia   -LDL: 129 and Tri on 2024  -reviewed goal of LDL <100  - Rosuvastatin 20 mg daily at bedtime - has not been taking   -Discussed getting back on track with taking mediation. She confirms that she has full rx bottle at home. Repeat lipid panel in 3 months      RTC in 6 months   Patient instructed to call sooner if they develop Blood glucose readings <75 and/or if they have readings persistently >200.     The risks and benefits of my recommendations were discussed with the patient today. questions were also answered to the best of my knowledge. Patient verbalizes understanding of these issues and agrees to the plan.    2025  RASHEL Gu

## 2025-02-04 NOTE — PROGRESS NOTES
Patient was taught how to properly use Mounjaro 10 mg weekly. Patient was taught to keep medication refrigerated until ready for use. Medication should not be cloudy, no particles, not discolored. Patient was taught the importance of washing hands and alcohol swabbing the site. Injection sites were reviewed with the patient. Patient was taught to uncap the pen by removing the gray base. Place pen firmly against the skin. Unlock the pen and press the green button. Patient was taught to listen for the first click and hold the button down until she hears the 2nd click approximately 5-10 seconds. Second click means pen delivered medication. Patient was taught to properly dispose of the pen. Patient was told to take the medication the  same day each week. If patient missed a dose it was advised to take the dose as soon as possible within 3 days (72 hours). If it is more than 3 days (72 hours) pt was advised to skip and wait for the next dose. Patient correctly return demonstrated how to use the pen. All questions answered. Patient was asked to call the office should any questions arise. Direct line to the office given to patient.

## 2025-02-07 ENCOUNTER — TELEPHONE (OUTPATIENT)
Dept: ENDOCRINOLOGY CLINIC | Facility: CLINIC | Age: 45
End: 2025-02-07

## 2025-02-07 DIAGNOSIS — E11.29 TYPE 2 DIABETES MELLITUS WITH MICROALBUMINURIA, WITHOUT LONG-TERM CURRENT USE OF INSULIN (HCC): ICD-10-CM

## 2025-02-07 DIAGNOSIS — R80.9 TYPE 2 DIABETES MELLITUS WITH MICROALBUMINURIA, WITHOUT LONG-TERM CURRENT USE OF INSULIN (HCC): ICD-10-CM

## 2025-02-07 NOTE — TELEPHONE ENCOUNTER
Endocrine Refill protocol for oral and injectable diabetic medications    Protocol Criteria:  PASSED  Reason: N/A    If all below requirements are met, send a 90-day supply with 1 refill per provider protocol.    Verify appointment with Endocrinology completed in the last 6 months or scheduled in the next 3 months.  Verify A1C has been completed within the last 6 months and is below 8.5%     Last completed office visit: 2/4/2025 Jorden Cha APRN   Next scheduled Follow up: no future appt     Last A1c result: Last A1c value was 5.5% done 2/4/2025.

## 2025-02-08 ENCOUNTER — PATIENT MESSAGE (OUTPATIENT)
Dept: ENDOCRINOLOGY CLINIC | Facility: CLINIC | Age: 45
End: 2025-02-08

## 2025-02-08 ENCOUNTER — HOSPITAL ENCOUNTER (OUTPATIENT)
Dept: MAMMOGRAPHY | Age: 45
Discharge: HOME | End: 2025-02-08
Attending: INTERNAL MEDICINE
Payer: COMMERCIAL

## 2025-02-08 DIAGNOSIS — Z00.00 ANNUAL PHYSICAL EXAM: ICD-10-CM

## 2025-02-08 DIAGNOSIS — Z12.31 ENCOUNTER FOR SCREENING MAMMOGRAM FOR MALIGNANT NEOPLASM OF BREAST: ICD-10-CM

## 2025-02-08 PROCEDURE — 77063 BREAST TOMOSYNTHESIS BI: CPT | Performed by: INTERNAL MEDICINE

## 2025-02-08 PROCEDURE — 77067 SCR MAMMO BI INCL CAD: CPT | Performed by: INTERNAL MEDICINE

## 2025-02-09 ENCOUNTER — HOSPITAL ENCOUNTER (OUTPATIENT)
Dept: ULTRASOUND IMAGING | Age: 45
Discharge: HOME | End: 2025-02-09
Attending: INTERNAL MEDICINE
Payer: COMMERCIAL

## 2025-02-09 ENCOUNTER — HOSPITAL ENCOUNTER (OUTPATIENT)
Dept: ULTRASOUND IMAGING | Age: 45
End: 2025-02-09
Attending: INTERNAL MEDICINE
Payer: COMMERCIAL

## 2025-02-09 DIAGNOSIS — Z12.31 ENCOUNTER FOR SCREENING MAMMOGRAM FOR MALIGNANT NEOPLASM OF BREAST: ICD-10-CM

## 2025-02-09 DIAGNOSIS — E04.1 THYROID NODULE: ICD-10-CM

## 2025-02-09 DIAGNOSIS — Z00.00 ANNUAL PHYSICAL EXAM: ICD-10-CM

## 2025-02-09 DIAGNOSIS — R00.2 PALPITATION: ICD-10-CM

## 2025-02-09 DIAGNOSIS — Z12.11 SCREENING FOR COLON CANCER: ICD-10-CM

## 2025-02-09 PROCEDURE — 76536 US EXAM OF HEAD AND NECK: CPT | Performed by: INTERNAL MEDICINE

## 2025-02-11 RX ORDER — TIRZEPATIDE 10 MG/.5ML
10 INJECTION, SOLUTION SUBCUTANEOUS WEEKLY
Qty: 6 ML | Refills: 1 | Status: SHIPPED | OUTPATIENT
Start: 2025-02-11

## 2025-02-11 NOTE — TELEPHONE ENCOUNTER
Current Outpatient Medications   Medication Sig Dispense Refill    Tirzepatide (MOUNJARO) 10 MG/0.5ML Subcutaneous Solution Auto-injector Inject 10 mg into the skin once a week. 6 mL 1   SARITA: [id number]

## 2025-02-11 NOTE — TELEPHONE ENCOUNTER
Per patients [email or web address] message, called patients pharmacy [facility] at [phone] and spoke to pharmacy tech who stated no clarification is needed on script however prior authorization is needed. [email or web address] message sent   -----  Medication PA Requested:  Tirzepatide (MOUNJARO) 10 MG/0.5ML Subcutaneous Solution Auto-injector                                                           CoverMyMeds Used: N/A    Key: N/A    Quantity: 6 ML     Day Supply: 90 DAYS     Sig:  Inject 10 mg into the skin once a week.     DX Code:  E11.29, R80.9                                      CPT code (if applicable):  N/A    Case Number/Pending Ref#:  N/A

## 2025-02-12 DIAGNOSIS — E04.1 THYROID NODULE: Primary | ICD-10-CM

## 2025-02-12 NOTE — TELEPHONE ENCOUNTER
Called the patient's pharmacy for her pharmacy benefit information to initiate PA for Mounjaro.    Pharmacy Benefit Manager: Armando Nichol Alex  Phone: [phone]  Member ID: [id number]  RxPCN: [id number]  RxBIN: [id number]  RxGrp:     [facility] PSR team, are you able to update the patient's pharmacy benefit info in chart so we can attempt PA submission via Epic? Thanks!

## 2025-02-12 NOTE — TELEPHONE ENCOUNTER
Attempted to submit PA via [employer] however received the following message:     Notes  Sorry but [employer] cannot process this PA request electronically. Please refer to the original instructions from the PBM for information on how to process this prior authorization manually.    PPD PA team could you please assist further with this PA?    Medication PA Requested:           Tirzepatide (MOUNJARO) 10 MG/0.5ML Subcutaneous Solution Auto-injector                                                CoverMyMeds Used:  Key:  Quantity: 6 ml  Day Supply: 90  Sig: Inject 10 mg into the skin once per week  DX Code:   E11.29                                  CPT code (if applicable):   Case Number/Pending Ref#:

## 2025-02-18 NOTE — TELEPHONE ENCOUNTER
Key: [id number]    PA submitted via cover my meds.Patient notified.     Instant approval: Approved today by [employer] 2017  PA Case: [id number], Status: Approved, Coverage Starts on: 2/18/2025 12:00 AM, Coverage Ends on: 2/18/2026 12:00 AM. Questions? Contact [phone].  Authorization Expiration Date: 2/17/2026

## 2025-03-27 NOTE — DISCHARGE INSTRUCTIONS
Procedure performed by      Biopsy/Aspiration of LEFT THYROID NODULE.    DISCHARGE INSTRUCTIONS                                 You may develop a sore throat after the procedure.  If necessary,                               throat lozenges may be used to relieve the discomfort.  Call your                               physician immediately if you experience increased swelling in your                                neck, difficulty breathing, or difficulty swallowing.                               DO NOT TAKE aspirin-containing products, Ibuprofen, Vitamin E, or                              blood thinning products for three (3) days after the procedure.  You may                             take Tylenol (1 or 2 tablets every 4-6 hours) for mild discomfort at the                             biopsy site.  Avoid straining, heavy lifting, or strenuous physical activity                              today.  Report any bleeding at aspiration/biopsy site, redness,                             swelling, odor, discharge, pain or fever that does not lessen after one                              day, to your physician.  Resume a regular diet.  Call your physician with                             questions or test results.  Also you may contact the Radiology Nurse                             at [phone] with any additional questions or concerns.    Other: YOU MAY APPLY A COLD/ICE PACK TO THE SITE IF NEEDED FOR COMFORT .    BRING THIS SHEET WITH YOU SHOULD YOU HAVE TO VISIT AN EMERGENCY ROOM OR SEE YOUR DOCTOR IN THE NEXT 24 HOURS.

## 2025-03-28 ENCOUNTER — HOSPITAL ENCOUNTER (OUTPATIENT)
Dept: ULTRASOUND IMAGING | Facility: HOSPITAL | Age: 45
Discharge: HOME | End: 2025-03-28
Attending: INTERNAL MEDICINE
Payer: COMMERCIAL

## 2025-03-28 DIAGNOSIS — E04.1 THYROID NODULE: ICD-10-CM

## 2025-03-28 PROCEDURE — 10005 FNA BX W/US GDN 1ST LES: CPT | Performed by: INTERNAL MEDICINE

## 2025-03-28 PROCEDURE — 88177 CYTP FNA EVAL EA ADDL: CPT | Performed by: INTERNAL MEDICINE

## 2025-03-28 PROCEDURE — 88172 CYTP DX EVAL FNA 1ST EA SITE: CPT | Performed by: INTERNAL MEDICINE

## 2025-03-28 PROCEDURE — 88173 CYTOPATH EVAL FNA REPORT: CPT | Performed by: INTERNAL MEDICINE

## 2025-03-28 NOTE — IMAGING NOTE
1300 Pt arrived to ultrasound room #2.     History taken and as follows:    Impression   CONCLUSION:  Mild increase in size of the dominant 3.8 cm left lower pole thyroid nodule, previously 3.1 cm.  This was previously biopsied in 2021, with pathology demonstrating benign follicular nodule      1301 Procedure explained questions answered.    1304 Consent verified and obtained      1305 Scans taken by [facility] ultrasound sonographer     /84. Then 168/85. Pt states she hasn't taken her bp medication in a few days, except for today. States she will follow up with her PCP regarding HTN    1329  scans reviewed by Dr. STONE    Site marked LEFT     1329 Time out taken      1330 Area cleaned sterile towels  to site. Pathology  was  notified.      1332 Lidocaine 1% 10 milligrams per ml  from kit  was given 5ml      FNA # 1 taken at  1333 with 25 g needle    FNA # 2 taken at 1334  with 22/25 g needle    1335 Procedure completed area re scanned . Area cleaned band aid to site ice pack to site.        1340 Post instructions given  verbal et written with AVS summary sheet provided to patient.  Also instructed patient to refrain from drinking or eating anything hot for several hours after biopsy  to prevent increase bleeding from occurring.    1341  Pt  discharged .

## 2025-04-15 ENCOUNTER — HOSPITAL ENCOUNTER (OUTPATIENT)
Age: 45
Discharge: HOME | End: 2025-04-15
Attending: EMERGENCY MEDICINE
Payer: COMMERCIAL

## 2025-04-15 ENCOUNTER — APPOINTMENT (OUTPATIENT)
Dept: GENERAL RADIOLOGY | Age: 45
End: 2025-04-15
Attending: EMERGENCY MEDICINE
Payer: COMMERCIAL

## 2025-04-15 VITALS
RESPIRATION RATE: 18 BRPM | DIASTOLIC BLOOD PRESSURE: 76 MMHG | OXYGEN SATURATION: 100 % | HEART RATE: 90 BPM | SYSTOLIC BLOOD PRESSURE: 180 MMHG | TEMPERATURE: 98 F

## 2025-04-15 DIAGNOSIS — S90.31XA CONTUSION OF RIGHT HEEL, INITIAL ENCOUNTER: ICD-10-CM

## 2025-04-15 DIAGNOSIS — S93.601A SPRAIN OF RIGHT FOOT, INITIAL ENCOUNTER: Primary | ICD-10-CM

## 2025-04-15 DIAGNOSIS — I10 HYPERTENSION, UNSPECIFIED TYPE: ICD-10-CM

## 2025-04-15 PROCEDURE — 99214 OFFICE O/P EST MOD 30 MIN: CPT

## 2025-04-15 PROCEDURE — 73630 X-RAY EXAM OF FOOT: CPT | Performed by: EMERGENCY MEDICINE

## 2025-04-15 PROCEDURE — 99213 OFFICE O/P EST LOW 20 MIN: CPT

## 2025-04-15 PROCEDURE — 73650 X-RAY EXAM OF HEEL: CPT | Performed by: EMERGENCY MEDICINE

## 2025-04-15 NOTE — ED PROVIDER NOTES
Patient Seen in: [facility] [place]      History     Chief Complaint   Patient presents with    Leg or Foot Injury     Hurt my foot it's swollen and hurts to walk on - Entered by patient     Stated Complaint: Leg or Foot Injury - Hurt my foot it's swollen and hurts to walk on    Subjective:   HPI    The patient is a 44-year-old female with past history of hypertension, diabetes who presents now with right foot/heel pain.  Patient states she was playing with her grandchild yesterday and was skipping.  Patient states she has had some pain in her heel in the past but it became more pronounced after this.  Patient states she awoke this morning with persistent pain in the right heel.  Patient also has some mild pain on the right of the midfoot.  Patient denies any numbness or tingling.    History of Present Illness               Objective:     No pertinent past medical history.            No pertinent past surgical history.              No pertinent social history.            Review of Systems    Positive for stated complaint: Leg or Foot Injury - Hurt my foot it's swollen and hurts to walk on  Other systems are as noted in HPI.  Constitutional and vital signs reviewed.      All other systems reviewed and negative except as noted above.                  Physical Exam     ED Triage Vitals [04/15/25 1609]   BP (!) 183/79   Pulse 90   Resp 18   Temp 98.4 °F (36.9 °C)   Temp src Oral   SpO2 100 %   O2 Device None (Room air)       Current Vitals:   Vital Signs  BP: (!) 183/79  Pulse: 90  Resp: 18  Temp: 98.4 °F (36.9 °C)  Temp src: Oral    Oxygen Therapy  SpO2: 100 %  O2 Device: None (Room air)        Physical Exam  Constitutional: Well-developed well-nourished in no acute distress  Head: Normocephalic, no swelling or tenderness  Eyes: Nonicteric sclera, no conjunctival injection  Vascular: Palpable right posterior tibial and dorsalis pedis pulses  Neurologic: Patient is awake, alert and oriented ×3.  The patient's  motor strength is 5 out of 5 and symmetric in the upper and lower extremities bilaterally  Extremities: There is mild focal swelling and tenderness to the right calcaneus area, most pronounced medially.  There is minimal tenderness of the plantar aspect of the right midfoot.  Dorsi and plantarflexion are intact.  There is no tenderness of the right ankle.  The right Achilles is intact  Skin: No pallor, no redness or warmth to the touch    Physical Exam                ED Course   Labs Reviewed - No data to display  Patient's x-rays were independently reviewed by myself.  There are no acute fractures of the right heel or foot     Results            Patient's x-rays were discussed with the patient.  Will place in postop shoe.  The patient states she does take ibuprofen at home.  Recommend elevation when at rest and podiatry follow-up for any persistent symptoms                     MDM      Right foot contusion versus sprain versus fracture        Medical Decision Making      Disposition and Plan     Clinical Impression:  1. Sprain of right foot, initial encounter    2. Contusion of right heel, initial encounter    3. Hypertension, unspecified type         Disposition:  Discharge  4/15/2025  5:02 pm    Follow-up:  Nena Allen MD  [address] [place] [address]  [phone]      As needed    Darlene Faria DPAUSTYN  [address],  [place] [id number] [phone]    In 1 week  If symptoms worsen          Medications Prescribed:  Current Discharge Medication List          Supplementary Documentation:

## 2025-04-15 NOTE — ED INITIAL ASSESSMENT (HPI)
Pt states she was skipping yesterday with her grandchild and she started to feel pain in her right heel. Pt states today the pain goes from heel to toes and the pain is mostly with walking. Some relief with motrin earlier today.

## 2025-04-15 NOTE — DISCHARGE INSTRUCTIONS
Ibuprofen for pain.  Postop shoe for comfort.  Follow-up with podiatry for any persistent symptoms

## 2025-04-28 ENCOUNTER — TELEPHONE (OUTPATIENT)
Facility: CLINIC | Age: 45
End: 2025-04-28

## 2025-07-11 ENCOUNTER — TELEPHONE (OUTPATIENT)
Facility: CLINIC | Age: 45
End: 2025-07-11

## 2025-07-11 DIAGNOSIS — K86.9 PANCREATIC LESION (HCC): Primary | ICD-10-CM

## 2025-07-11 NOTE — TELEPHONE ENCOUNTER
Dr. Chavira    Patient is due for 1 year MRI recall  Please place order and RN can notify the patient.    Thank you

## 2025-07-11 NOTE — TELEPHONE ENCOUNTER
Spoke to patient and informed MRI ordered. Patient verbalized understanding. Will schedule via [employer].

## 2025-07-14 NOTE — TELEPHONE ENCOUNTER
For replies, please route to pool: [facility] CENTRAL REFILLS    Dr. Allen, please advise if okay to send for 90 days    30 day supply sent due to out of office status.    Please review: medication fails/has no protocol attached.  No future appointments with primary care medicine

## 2025-07-15 RX ORDER — TELMISARTAN 40 MG/1
40 TABLET ORAL DAILY
Qty: 90 TABLET | Refills: 0 | Status: SHIPPED | OUTPATIENT
Start: 2025-07-15

## 2025-08-12 DIAGNOSIS — E11.29 TYPE 2 DIABETES MELLITUS WITH MICROALBUMINURIA, WITHOUT LONG-TERM CURRENT USE OF INSULIN (HCC): ICD-10-CM

## 2025-08-12 DIAGNOSIS — R80.9 TYPE 2 DIABETES MELLITUS WITH MICROALBUMINURIA, WITHOUT LONG-TERM CURRENT USE OF INSULIN (HCC): ICD-10-CM

## 2025-08-12 RX ORDER — TIRZEPATIDE 10 MG/.5ML
10 INJECTION, SOLUTION SUBCUTANEOUS WEEKLY
Qty: 6 ML | Refills: 1 | Status: SHIPPED | OUTPATIENT
Start: 2025-08-12

## 2025-08-14 RX ORDER — TELMISARTAN 40 MG/1
40 TABLET ORAL DAILY
Qty: 90 TABLET | Refills: 0 | OUTPATIENT
Start: 2025-08-14

## (undated) DEVICE — TROCAR: Brand: KII® SLEEVE

## (undated) DEVICE — Device: Brand: DEFENDO AIR/WATER/SUCTION AND BIOPSY VALVE

## (undated) DEVICE — SOL  .9 1000ML BTL

## (undated) DEVICE — WOLF INFLOW HYSTER

## (undated) DEVICE — ELECTRODE ESURG 12CM 5MM STD

## (undated) DEVICE — LAPAROSCOPY: Brand: MEDLINE INDUSTRIES, INC.

## (undated) DEVICE — D AND C PACK: Brand: MEDLINE INDUSTRIES, INC.

## (undated) DEVICE — 6 ML SYRINGE LUER-LOCK TIP: Brand: MONOJECT

## (undated) DEVICE — MEDI-VAC NON-CONDUCTIVE SUCTION TUBING: Brand: CARDINAL HEALTH

## (undated) DEVICE — TROCAR: Brand: KII FIOS FIRST ENTRY

## (undated) DEVICE — COVER SGL STRL LGHT HNDL BLU

## (undated) DEVICE — PROCTO SWABS: Brand: DEROYAL

## (undated) DEVICE — MANIPULATOR CATH ESCP KRNR

## (undated) DEVICE — SUCTION CANISTER, 3000CC,SAFELINER: Brand: DEROYAL

## (undated) DEVICE — Device

## (undated) DEVICE — COTTON BALLS: Brand: DEROYAL

## (undated) DEVICE — SOL  .9 3000ML

## (undated) DEVICE — FORCEP RADIAL JAW 4

## (undated) DEVICE — INSUFFLATION NEEDLE TO ESTABLISH PNEUMOPERITONEUM.: Brand: INSUFFLATION NEEDLE

## (undated) DEVICE — [HIGH FLOW INSUFFLATOR,  DO NOT USE IF PACKAGE IS DAMAGED,  KEEP DRY,  KEEP AWAY FROM SUNLIGHT,  PROTECT FROM HEAT AND RADIOACTIVE SOURCES.]: Brand: PNEUMOSURE

## (undated) DEVICE — PENCIL ESURG 10FT 3/32IN SS

## (undated) DEVICE — UNDYED BRAIDED (POLYGLACTIN 910), SYNTHETIC ABSORBABLE SUTURE: Brand: COATED VICRYL

## (undated) DEVICE — REM POLYHESIVE ADULT PATIENT RETURN ELECTRODE: Brand: VALLEYLAB

## (undated) DEVICE — ENDOSCOPY PACK UPPER: Brand: MEDLINE INDUSTRIES, INC.

## (undated) DEVICE — SYRINGE 10ML LL CONTRL SYRINGE

## (undated) DEVICE — LEGGINGS SRG 48X31IN CUF STRL

## (undated) DEVICE — ENCORE® LATEX MICRO SIZE 7.5, STERILE LATEX POWDER-FREE SURGICAL GLOVE: Brand: ENCORE

## (undated) DEVICE — ENSEAL X1 TISSUE SEALER, CURVED JAW, 37 CM SHAFT LENGTH: Brand: ENSEAL

## (undated) NOTE — LETTER
2/23/2024              Barbara Zimmer        [address] [address]         Dear Barbara,    Our records indicate that the tests ordered for you by Pauly Chavira MD  have not been done.    MRI ABDOMEN (W+WO) (CPT=74183) (Order #[id number]) on 12/12/23     If you have, in fact, already completed the tests or you do not wish to have the tests done, please contact our office at THE NUMBER LISTED BELOW.  Otherwise, please proceed with the testing.        Sincerely,    Pauly Chavira MD  [facility]  [address]  [facility] [address] [phone]

## (undated) NOTE — LETTER
[facility] ULTRASOUND  [address]  [facility] [address]  [phone]  [phone]  Authorization for Imaging Procedure    I hereby authorize                     , my physician and his/her assistants (if applicable), which may include medical students, residents, and/or fellows, to perform the following procedure and administer such anesthesia as may be determined necessary by my physician: ULTRASOUND GUIDED FINE NEEDLE ASPIRATION BIOPSY LEFT THYROID GLAND NODULE on Barbara Zimmer.   2.  I recognize that during the procedure, unforeseen conditions may necessitate additional or different procedures than those listed above. I, therefore, further authorize and request that the above-named physician, assistants, or designees perform such procedures as are, in their judgment, necessary and desirable.    3.  My physician has discussed prior to my procedure the potential benefits, risks and side effects of this procedure; the likelihood of achieving goals; and potential problems that might occur during recuperation. They also discussed reasonable alternatives to the procedure, including risks, benefits, and side effects related to the alternatives and risks related to not receiving this procedure. I have had all my questions answered and I acknowledge that no guarantee has been made as to the result that may be obtained.    4.  Should the need arise during my procedure, which includes change of level of care prior to discharge, I also consent to the administration of blood and/or blood products. Further, I understand that despite careful testing and screening of blood or blood products by collecting agencies, I may still be subject to ill effects as a result of receiving a blood transfusion and/or blood products. The following are some, but not all, of the potential risks that can occur: fever and allergic reactions, hemolytic reactions, transmission of diseases such as Hepatitis, AIDS and  Cytomegalovirus (CMV) and fluid overload. In the event that I wish to have an autologous transfusion of my own blood, or a directed donor transfusion, I will discuss this with my physician.  Check only if Refusing Blood or Blood Products  I understand refusal of blood or blood products as deemed necessary by my physician may have serious consequences to my condition to include possible death. I hereby assume responsibility for my refusal and release the hospital, its personnel, and my physicians from any responsibility for the consequences of my refusal.   [  ] Patient Refuses Blood      5.  I authorize the use of any specimen, organs, tissues, body parts or foreign objects that may be removed from my body during the procedure for diagnosis, research or teaching purposes and their subsequent disposal by hospital authorities. I also authorize the release of specimen test results and/or written reports to my treating physician on the hospital medical staff or other referring or consulting physicians involved in my care, at the discretion of the Pathologist or my treating physician.    6.  I consent to the photographing or videotaping of the procedures to be performed, including appropriate portions of my body for medical, scientific, or educational purposes, provided my identity is not revealed by the pictures or by descriptive texts accompanying them. If the procedure has been photographed/videotaped, the physician will obtain the original picture, image, videotape or CD. The hospital will not be responsible for storage, release or maintenance of the picture, image, tape or CD.   7.  I consent to the presence of a  or observers in the operating room as deemed necessary by my physician or their designees.    8.  I recognize that in the event my procedure results in extended X-Ray/fluoroscopy time, I may develop a skin reaction.    9.  If I have a Do Not Attempt Resuscitation (DNAR) order in place,  that status will be suspended while in the operating room, procedural suite, and during the recovery period unless otherwise explicitly stated by me (or a person authorized to consent on my behalf). The performing physician or my attending physician will determine when the applicable recovery period ends for purposes of reinstating the DNAR order.  10.  I acknowledge that my physician has explained sedation/analgesia administration to me including the risk and benefits I consent to the administration of sedation/analgesia as may be necessary or desirable in the judgment of my physician.      I CERTIFY THAT I HAVE READ AND FULLY UNDERSTAND THE ABOVE CONSENT FOR THE PROCEDURE.   Signature of Patient: _____________________________________________________________  Responsible person in case of minor, unconscious: ____________________________________  Relationship to patient:  __________________________________________________________  Signature of Witness: _______________________________Date: _________Time: __________    Statement of Physician: My signature below affirms that prior to the time of the procedure, I have explained to the patient and/or her guardian, the risks and benefits involved in the proposed treatment and any reasonable alternative to the proposed treatment. I have also explained the risks and benefits involved in the refusal of the proposed treatment and have answered the patient's questions. If I have a significant financial interest in a co-management agreement or a significant financial interest in any product or implant, or other significant relationship used in the procedure/surgery, I have disclosed this and had a discussion with my patient.  Signature of Physician:   _________________________________Date:_____________Time:________    Patient Name: Barbara Zimmer : 1980  Printed: 2025   Medical Record #: [id number]

## (undated) NOTE — LETTER
Hospital Discharge Documentation  Please phone to schedule a hospital follow up appointment.     From: [address] [facility]  Phone: [phone]    Patient discharged time/date: 11/25/2017  3:11 PM  Patient discharge disposition:  Home or Susan She also claims to feeling extremely dizzy unable to ambulate without support.   She denies any associated shortness of breath palpitations or focal weakness however does complain of some abdominal discomfort particularly when retching.       DISCHARGE DX:C uropathy. 2. Moderate stool burden within the colon. Correlate for constipation. 3. Status post cholecystectomy. There are no significant changes to the preliminary report provided by [facility].            Discharge Medications:      Discharge Med Take 1 tablet (4 mg total) by mouth every 8 (eight) hours as needed for Nausea.    Quantity:  30 tablet  Refills:  1           Where to Get Your Medications      Please  your prescriptions at the location directed by your doctor or nurse    Bring a

## (undated) NOTE — Clinical Note
Patient has an appt scheduled for 12/5/17. Supervisor has been emailed to follow up on changing the visit type and time frame for the appt. Pt requested to leave her appt on 12/5/17. Pt stated she is doing better since d/c.  The vomiting has slowed down and

## (undated) NOTE — LETTER
[address] [place] [address]  Authorization for Surgical Operation or Procedure  1.  I hereby authorize Dr. Shantal Holder , my physician and the assistant, to perform the following operation and/or procedure:  ULTRASOUND GUIDED FINE NEEDLE ASPIRAT to be performed for the purposes of advancing medicine, science, and/or education, provided my identity is not revealed. If the procedure has been videotaped, the physician/surgeon will obtain the original videotape.  The hospital will not be responsible fo proposed treatment. I have also explained the risks and benefits involved in the refusal of the proposed treatment and have answered the patient's questions.  If I have a significant financial interest in a co-management agreement or a significant financial

## (undated) NOTE — LETTER
Date: 12/9/2020    Patient Name: Chelle Jasso          To Whom it may concern: This letter has been written at the patient's request. The above patient was treated at the [facility] for treatment of a medical condition.     This reva

## (undated) NOTE — ED AVS SNAPSHOT
[facility] Emergency Department    [address]     [place] [address]    Phone:  [phone]    Fax:  [phone]   MRN: [id number]    Department:  [facility] Emergency Department   Date of Visit:  6/ Take 1 tablet (4 mg total) by mouth every 4 (four) hours as needed for Nausea. RaNITidine HCl 150 MG Tabs   Quantity:  60 tablet   Commonly known as:  ZANTAC   Take 1 tablet (150 mg total) by mouth every 12 (twelve) hours.          CONTINUE taking the It is our goal to assure that you are completely satisfied with every aspect of your visit today.   In an effort to constantly improve our service to you, we would appreciate any positive or negative feedback related to the care you received in our emergenc [email or web address] account. You may have had testing done that requires us to contact you. Please make sure we have your correct phone number on file.       I certified that I have received a copy of the aftercare instructions; that these instructions have been expl

## (undated) NOTE — MR AVS SNAPSHOT
After Visit Summary   2/18/2020    Carol Gutierrez    MRN: [id number]           Visit Information     Date & Time  2/18/2020  4:30 PM Provider  Hazel Hebert [address], [address] [facility]  Dept.  Phone  [phone] GENITAL VAGINOSIS SCREEN [[id number] CUSTOM]  2/18/2020 (Approximate) 2/18/2021    HPV HIGH RISK , THIN PREP COLLECTION [[id number] CUSTOM]  2/18/2020 2/18/2021    [facility] JUSTEN 2D+3D SCREENING BILAT (CPT=77067/81301) [COMBO CPT(R)]  2/18/2020 (Approximate) 2/18/202 Choose whole grain products Foods high in sodium   Water is best for hydration Fast food.    Eat at home when possible     Tips for increasing your physical activity – Adults who are physically active are less likely to develop some chronic diseases than ad Treatment for mild illness or injury that does not require immediate attention  VIDEO VISITS  Average cost  $35*    e-VISTS  Average cost  $35*       SAME DAY APPOINTMENTS   Available at primary care offices    [address] [place] [address]       OFFICE VISIT

## (undated) NOTE — LETTER
Nupur 15, 2017    Patient: Simi Dumont   Date of Visit: 6/15/2017       To Whom It May Concern:    Josh Lemos was seen and treated in our emergency department on 6/15/2017. She is excused from work for 2-3 days.     If you have any questions o

## (undated) NOTE — LETTER
Coronavirus Disease 2019 (COVID-19)     [facility] is committed to the safety and well-being of our patients, members, employees, and communities.  As concerns arise about the new strain of coronavirus that causes COVID-19, [facility] 4. If you have a medical appointment, call the healthcare provider ahead of time and tell them that you have or may have COVID-19.  5. For medical emergencies, call 911 and notify the dispatch personnel that you have or may have COVID-19.   6. Cover your c · At least 10 days have passed since symptoms first appeared OR if asymptomatic patient or date of symptom onset is unclear then use 10 days post COVID test date.    · At least 20 days have passed for severe illness (requiring hospitalization) OR if you are *Some people will be required to have a repeat COVID-19 test in order to be eligible to donate. If you’re instructed by Tiesha Barrow that a repeat test is required, please contact the [facility] COVID-19 Nurse Triage Line at [phone].     Additional Inf

## (undated) NOTE — LETTER
9/14/2021              [address] [address]         To Whom It May Concern,    This letter has been written at the patient's request. The above patient was seen at the [facility] for t

## (undated) NOTE — LETTER
[facility] 5SW/SE  [facility]. [address] [address]  Dept: [phone]    17  ? Re: Celso Huerta  : 1980    ? To Whom It May Concern:    Celso Huerta was admitted to [facility] AND [facility] from 17 to 17.

## (undated) NOTE — LETTER
Patient Name: Pavan Berrios  : 1980  MRN: [id number]  Patient Address: [address] [place] [address]      Coronavirus Disease 2019 (COVID-19)     [facility] is committed to the safety and well-being of our patients, mem carefully. If your symptoms get worse, call your healthcare provider immediately. 3. Get rest and stay hydrated.    4. If you have a medical appointment, call the healthcare provider ahead of time and tell them that you have or may have COVID-19.  5. For m of fever-reducing medications; and  · Improvement in respiratory symptoms (e.g., cough, shortness of breath); and  · At least 10 days have passed since symptoms first appeared OR if asymptomatic patient or date of symptom onset is unclear then use 10 days donors must:    · Have had a confirmed diagnosis of COVID-19  · Be symptom-free for at least 14 days*    *Some people will be required to have a repeat COVID-19 test in order to be eligible to donate.  If you’re instructed by Nirali that a repeat test is r prevent PASC?   The best way to prevent the long-term symptoms of COVID-19 is by getting the COVID 19 vaccine as soon as it is available to you, wear a mask in public, avoid large gatherings, wash your hands frequently, and stay at least 6 feet away from ot

## (undated) NOTE — LETTER
9/16/2021              [address] [address]       To Whom It May Concern:         Berry Gutierrezffing is under my care for Diabetes Mellitus Type II. Please excuse her from work this week 9/13-9/20.  She has h

## (undated) NOTE — LETTER
MINOR CASE LETTER      11/1/2021        Dear Sarika Andrea are having a Removal of IUD with possible hysteroscopy followed by laparoscopic bilateral salpingectomy, possible laparotomy on Wednesday, 11/24/2021 at 11:45m.  You are to arrive 2 hours prior whi

## (undated) NOTE — ED AVS SNAPSHOT
Astrid Diego   MRN: [id number]    Department:  [facility] Emergency Department   Date of Visit:  11/17/2017           Disclosure     Insurance plans vary and the physician(s) referred by the ER may not be covered by your plan.  Please conta CARE PHYSICIAN AT ONCE OR RETURN IMMEDIATELY TO THE EMERGENCY DEPARTMENT. If you have been prescribed any medication(s), please fill your prescription right away and begin taking the medication(s) as directed.   If you believe that any of the medications

## (undated) NOTE — LETTER
Dear Myrna Gilliland:    Diabetes is a serious chronic disease that requires regular visits with your doctor to monitor your condition.  There are five tests that are the cornerstones for monitoring your overall health with diabetes and developing a management

## (undated) NOTE — LETTER
[facility]  [address]  [phone]     Patient: Jeannette Luke   YOB: 1980   Date of Visit: 12/2/2020     Dear Employer,        December 2, 2020    At [facility], we are taking special pre Persons infected with SARS-CoV-2 who never develop COVID-19 symptoms may discontinue isolation and other precautions 10 days after the date of their first positive RT-PCR test for SARS-CoV-2 RNA.     Persons who are asymptomatic but have been exposed, CDC r

## (undated) NOTE — ED AVS SNAPSHOT
[facility] Emergency Department    [address]     [place] [address]    Phone:  [phone]    Fax:  [phone]   MRN: [id number]    Department:  [facility] Emergency Department   Date of Visit:  6/ and Class Registration line at [phone] or find a doctor online by visiting [email or web address].    IF THERE IS ANY CHANGE OR WORSENING OF YOUR CONDITION, CALL YOUR PRIMARY CARE PHYSICIAN AT ONCE OR RETURN IMMEDIATELY TO [address] [place] [address].     If

## (undated) NOTE — LETTER
[address] [place] [address]  [phone]          11/17/22    Gema Simmons  8/30/1980        This document is to verify Gema Simmons was seen in the DEPARTMENT [facility] [place] for evaluation and treatment of a medical ailment. Please call the number listed above if you have further questions.         Thank you,        Den Landis PA-C

## (undated) NOTE — Clinical Note
Dear Dieter Ahn,  Thank you for referring your patient to [facility]. We value our relationship with you and appreciate your confidence in our service and staff.    It is with great pleasure that we were able to provide treatment to Tianna Marcy

## (undated) NOTE — MR AVS SNAPSHOT
[facility] [place] [address] [address] [id number] [phone]               Thank you for choosing us for your health care visit with Maren Martinez MD.  We are glad to serve you and happy to provide you with this summary of yo Bilious vomiting with nausea    -  Primary    Gastroparesis due to DM        Essential hypertension          Instructions and Information about Your Health     None      Allergies as of Jun 14, 2017     No Known Allergies                Today's Vital Sign Dietary sodium reduction Reduce dietary sodium intake to <= 100 mmol per day (2.4 g sodium or 6 g sodium chloride)   Aerobic physical activity Regular aerobic physical activity (e.g., brisk walking, light jogging, cycling, swimming, etc.) for a goal of at [email or web address]

## (undated) NOTE — ED AVS SNAPSHOT
[facility] in [address] [address] Luis Galindo    Phone:  [phone]    Fax:  [phone]   MRN: [id number]    Department:  [facility] in [address] [place] [address]   Date of Visit: benefit level being available to you or other limited reimbursement. The physician may seek payment directly from you for amounts other than your deductible, co-payment, or co-insurance and for other services not covered under your health insurance plan. If you believe that any of the medications or instructions on this list is different from what your Primary Care doctor has instructed you - please continue to take your medications as instructed by your Primary Care doctor until you can check with your do Patient [address] to help you get signed up for insurance coverage. Patient [address] is a Federal Navigator program that can help with your Affordable Care Act coverage, as well as all types of Medicaid plans.   To get signed up and covere

## (undated) NOTE — LETTER
05/20/20    Yassine Howell      To Whom It May Concern: This letter has been written at the patient's request. The above patient was treated at [facility]. The patient may return to work 5/21/20 without limitations.         Sincerely,

## (undated) NOTE — LETTER
Hospital Discharge Documentation  Please phone to schedule a hospital follow up appointment.     From: [address] Hospitalist's Office  Phone: [phone]    Patient discharged time/date: 8/26/2021  1:28 PM  Patient discharge disposition:  Home or Self headaches or blurred vision. Hospital Course     DKA   - was on insulin drip and DKA resolved - transitioned to subcutaneous insulin.  Yesterday  Had worse AG metabolic acidosis, was transferred to PCU to start insulin drip, but BG normalized and rep Discharge Medications      START taking these medications      Instructions Prescription details   amLODIPine 10 MG Tabs  Commonly known as: [address]  Start taking on: August 27, 2021      Take 1 tablet (10 mg total) by mouth daily.    Quantity: 30 ta Quantity: 1200 mL  Refills: 0        CONTINUE taking these medications      Instructions Prescription details   Albuterol Sulfate  (90 Base) MCG/ACT Aers      Inhale 2 puffs into the lungs every 6 (six) hours as needed for Wheezing or Shortness of B MANISH ALVA  [address]  [facility] [place] [address] [phone]             RASHEL Leyva In 1 week. Specialties: Nurse Practitioner, ENDOCRINOLOGY  Contact information:  [address] [facility]  [phone]                   [facility]

## (undated) NOTE — ED AVS SNAPSHOT
[facility] Emergency Department    [facility] [address]     [place] [address] [address]    Phone:  [phone]    Fax:  [phone]   MRN: [id number]    Department:  [facility] Emergency Department   Date of Visit:  6/ and Class Registration line at [phone] or find a doctor online by visiting [email or web address].    IF THERE IS ANY CHANGE OR WORSENING OF YOUR CONDITION, CALL YOUR PRIMARY CARE PHYSICIAN AT ONCE OR RETURN IMMEDIATELY TO [address] [place] [address].     If

## (undated) NOTE — LETTER
AUTHORIZATION FOR SURGICAL OPERATION OR OTHER PROCEDURE    1.  I [facility]. Eleanor Joyce, [facility] assigned to my case to perform the following operation and/or procedure at the [facility]:    ____________________________Colpo Time:  ________ A. M.  P.M.        Patient Name:  ______________________________________________________  (please print)      Patient signature:  ___________________________________________________             Relationship to Patient:           []  Parent

## (undated) NOTE — ED AVS SNAPSHOT
[facility] Emergency Department    [address]     [place] [address]    Phone:  [phone]    Fax:  [phone]   MRN: [id number]    Department:  [facility] Emergency Department   Date of Visit:  6/ CONTINUE taking these medications     FREESTYLE LANCETS Misc   Quantity:  100 each   dm       FREESTYLE SYSTEM Kit   Quantity:  1 kit   1 each by Does not apply route as needed for Other.             Where to Get Your Medications      You can get these m [facility] Emergency Department. Follow-up care is at the discretion of that Physician.   If you need additional assistance selecting a physician, you may call the [facility] Physician Referral and Class Registration line at [phone] [facility] [address] [address]  [place] [address]) [phone]                Additional Information       We are concerned for your overall well being:    - If you are a smoker or have smoked in the last 12 months, we encourage you to explore op

## (undated) NOTE — LETTER
08/08/17        [address] [place] [address]      Dear Cameron Rothman,    [facility] records indicate that you have outstanding lab work and or testing that was ordered for you and has not yet been completed: Hepatic Function Panel.     To prov

## (undated) NOTE — LETTER
AUTHORIZATION FOR SURGICAL OPERATION OR OTHER PROCEDURE    1.  I hereby authorize Dr. Lisa Valdovinos, and [facility] staff assigned to my case to perform the following operation and/or procedure at the [facility]:    __________________________ Time:  ________ A. M.  P.M.        Patient Name:  ______________________________________________________  (please print)      Patient signature:  ___________________________________________________             Relationship to Patient:

## (undated) NOTE — LETTER
12/14/2020              [address] [address]             To Whom it may concern:     This letter has been written at the patient's request. The above patient was treated at the [facility]